# Patient Record
Sex: MALE | Race: WHITE | Employment: OTHER | URBAN - METROPOLITAN AREA
[De-identification: names, ages, dates, MRNs, and addresses within clinical notes are randomized per-mention and may not be internally consistent; named-entity substitution may affect disease eponyms.]

---

## 2018-01-01 ENCOUNTER — APPOINTMENT (OUTPATIENT)
Dept: ULTRASOUND IMAGING | Age: 83
DRG: 245 | End: 2018-01-01
Attending: INTERNAL MEDICINE
Payer: MEDICARE

## 2018-01-01 ENCOUNTER — ANESTHESIA (OUTPATIENT)
Dept: SURGERY | Age: 83
DRG: 245 | End: 2018-01-01
Payer: MEDICARE

## 2018-01-01 ENCOUNTER — ANESTHESIA EVENT (OUTPATIENT)
Dept: SURGERY | Age: 83
DRG: 245 | End: 2018-01-01
Payer: MEDICARE

## 2018-01-01 ENCOUNTER — APPOINTMENT (OUTPATIENT)
Dept: GENERAL RADIOLOGY | Age: 83
DRG: 245 | End: 2018-01-01
Attending: INTERNAL MEDICINE
Payer: MEDICARE

## 2018-01-01 ENCOUNTER — HOSPITAL ENCOUNTER (INPATIENT)
Age: 83
LOS: 2 days | DRG: 682 | End: 2018-05-05
Attending: EMERGENCY MEDICINE | Admitting: INTERNAL MEDICINE
Payer: MEDICARE

## 2018-01-01 ENCOUNTER — APPOINTMENT (OUTPATIENT)
Dept: GENERAL RADIOLOGY | Age: 83
DRG: 638 | End: 2018-01-01
Attending: INTERNAL MEDICINE
Payer: MEDICARE

## 2018-01-01 ENCOUNTER — APPOINTMENT (OUTPATIENT)
Dept: CT IMAGING | Age: 83
DRG: 682 | End: 2018-01-01
Attending: EMERGENCY MEDICINE
Payer: MEDICARE

## 2018-01-01 ENCOUNTER — HOSPITAL ENCOUNTER (INPATIENT)
Age: 83
LOS: 5 days | Discharge: HOME | DRG: 638 | End: 2018-05-01
Attending: INTERNAL MEDICINE | Admitting: HOSPITALIST
Payer: MEDICARE

## 2018-01-01 ENCOUNTER — APPOINTMENT (OUTPATIENT)
Dept: NUCLEAR MEDICINE | Age: 83
DRG: 638 | End: 2018-01-01
Attending: NURSE PRACTITIONER
Payer: MEDICARE

## 2018-01-01 ENCOUNTER — APPOINTMENT (OUTPATIENT)
Dept: GENERAL RADIOLOGY | Age: 83
DRG: 682 | End: 2018-01-01
Attending: FAMILY MEDICINE
Payer: MEDICARE

## 2018-01-01 ENCOUNTER — PATIENT OUTREACH (OUTPATIENT)
Dept: CASE MANAGEMENT | Age: 83
End: 2018-01-01

## 2018-01-01 ENCOUNTER — APPOINTMENT (OUTPATIENT)
Dept: MRI IMAGING | Age: 83
DRG: 638 | End: 2018-01-01
Attending: NURSE PRACTITIONER
Payer: MEDICARE

## 2018-01-01 ENCOUNTER — APPOINTMENT (OUTPATIENT)
Dept: CARDIAC CATH/INVASIVE PROCEDURES | Age: 83
DRG: 245 | End: 2018-01-01
Payer: MEDICARE

## 2018-01-01 ENCOUNTER — APPOINTMENT (OUTPATIENT)
Dept: GENERAL RADIOLOGY | Age: 83
DRG: 682 | End: 2018-01-01
Attending: INTERNAL MEDICINE
Payer: MEDICARE

## 2018-01-01 ENCOUNTER — APPOINTMENT (OUTPATIENT)
Dept: ULTRASOUND IMAGING | Age: 83
DRG: 638 | End: 2018-01-01
Attending: HOSPITALIST
Payer: MEDICARE

## 2018-01-01 ENCOUNTER — HOSPITAL ENCOUNTER (INPATIENT)
Age: 83
LOS: 5 days | Discharge: HOME | DRG: 245 | End: 2018-01-30
Attending: INTERNAL MEDICINE | Admitting: INTERNAL MEDICINE
Payer: MEDICARE

## 2018-01-01 VITALS
BODY MASS INDEX: 25.12 KG/M2 | SYSTOLIC BLOOD PRESSURE: 98 MMHG | HEART RATE: 94 BPM | DIASTOLIC BLOOD PRESSURE: 66 MMHG | WEIGHT: 165.2 LBS | TEMPERATURE: 97.7 F | RESPIRATION RATE: 18 BRPM | OXYGEN SATURATION: 90 %

## 2018-01-01 VITALS
RESPIRATION RATE: 18 BRPM | HEART RATE: 80 BPM | BODY MASS INDEX: 29.22 KG/M2 | WEIGHT: 192.8 LBS | OXYGEN SATURATION: 97 % | SYSTOLIC BLOOD PRESSURE: 101 MMHG | DIASTOLIC BLOOD PRESSURE: 58 MMHG | TEMPERATURE: 97 F | HEIGHT: 68 IN

## 2018-01-01 VITALS
TEMPERATURE: 97.8 F | BODY MASS INDEX: 27.5 KG/M2 | DIASTOLIC BLOOD PRESSURE: 60 MMHG | WEIGHT: 181.44 LBS | HEIGHT: 68 IN | SYSTOLIC BLOOD PRESSURE: 151 MMHG

## 2018-01-01 DIAGNOSIS — N17.9 AKI (ACUTE KIDNEY INJURY) (HCC): ICD-10-CM

## 2018-01-01 DIAGNOSIS — I50.22 CHRONIC SYSTOLIC HEART FAILURE (HCC): ICD-10-CM

## 2018-01-01 DIAGNOSIS — E86.0 DEHYDRATION: ICD-10-CM

## 2018-01-01 DIAGNOSIS — I95.9 HYPOTENSION, UNSPECIFIED HYPOTENSION TYPE: Primary | ICD-10-CM

## 2018-01-01 DIAGNOSIS — I46.9 CARDIAC ARREST (HCC): ICD-10-CM

## 2018-01-01 DIAGNOSIS — I49.01 VENTRICULAR FIBRILLATION (HCC): Primary | ICD-10-CM

## 2018-01-01 LAB
ALBUMIN SERPL-MCNC: 2.4 G/DL (ref 3.2–4.6)
ALBUMIN SERPL-MCNC: 2.7 G/DL (ref 3.2–4.6)
ALBUMIN/GLOB SERPL: 0.5 {RATIO} (ref 1.2–3.5)
ALBUMIN/GLOB SERPL: 0.6 {RATIO} (ref 1.2–3.5)
ALP SERPL-CCNC: 172 U/L (ref 50–136)
ALP SERPL-CCNC: 86 U/L (ref 50–136)
ALT SERPL-CCNC: 43 U/L (ref 12–65)
ALT SERPL-CCNC: 64 U/L (ref 12–65)
ANION GAP SERPL CALC-SCNC: 10 MMOL/L (ref 7–16)
ANION GAP SERPL CALC-SCNC: 10 MMOL/L (ref 7–16)
ANION GAP SERPL CALC-SCNC: 11 MMOL/L (ref 7–16)
ANION GAP SERPL CALC-SCNC: 12 MMOL/L (ref 7–16)
ANION GAP SERPL CALC-SCNC: 13 MMOL/L (ref 7–16)
ANION GAP SERPL CALC-SCNC: 14 MMOL/L (ref 7–16)
ANION GAP SERPL CALC-SCNC: 15 MMOL/L (ref 7–16)
ANION GAP SERPL CALC-SCNC: 16 MMOL/L (ref 7–16)
ANION GAP SERPL CALC-SCNC: 7 MMOL/L (ref 7–16)
ANION GAP SERPL CALC-SCNC: 9 MMOL/L (ref 7–16)
APPEARANCE UR: ABNORMAL
ARTERIAL PATENCY WRIST A: ABNORMAL
ARTERIAL PATENCY WRIST A: POSITIVE
AST SERPL-CCNC: 48 U/L (ref 15–37)
AST SERPL-CCNC: 90 U/L (ref 15–37)
ATRIAL RATE: 66 BPM
ATRIAL RATE: 80 BPM
ATRIAL RATE: 80 BPM
BACTERIA SPEC CULT: NORMAL
BACTERIA SPEC CULT: NORMAL
BACTERIA URNS QL MICRO: NORMAL /HPF
BASE DEFICIT BLDA-SCNC: 12.4 MMOL/L (ref 0–2)
BASE EXCESS BLD CALC-SCNC: 1 MMOL/L
BASE EXCESS BLDA CALC-SCNC: 0 MMOL/L (ref 0–3)
BASOPHILS # BLD: 0 K/UL (ref 0–0.2)
BASOPHILS # BLD: 0.1 K/UL (ref 0–0.2)
BASOPHILS NFR BLD: 0 % (ref 0–2)
BASOPHILS NFR BLD: 1 % (ref 0–2)
BDY SITE: ABNORMAL
BDY SITE: ABNORMAL
BILIRUB SERPL-MCNC: 1.4 MG/DL (ref 0.2–1.1)
BILIRUB SERPL-MCNC: 1.5 MG/DL (ref 0.2–1.1)
BILIRUB UR QL: ABNORMAL
BUN SERPL-MCNC: 33 MG/DL (ref 8–23)
BUN SERPL-MCNC: 36 MG/DL (ref 8–23)
BUN SERPL-MCNC: 39 MG/DL (ref 8–23)
BUN SERPL-MCNC: 40 MG/DL (ref 8–23)
BUN SERPL-MCNC: 41 MG/DL (ref 8–23)
BUN SERPL-MCNC: 41 MG/DL (ref 8–23)
BUN SERPL-MCNC: 42 MG/DL (ref 8–23)
BUN SERPL-MCNC: 47 MG/DL (ref 8–23)
BUN SERPL-MCNC: 51 MG/DL (ref 8–23)
BUN SERPL-MCNC: 52 MG/DL (ref 8–23)
BUN SERPL-MCNC: 53 MG/DL (ref 8–23)
BUN SERPL-MCNC: 57 MG/DL (ref 8–23)
BUN SERPL-MCNC: 59 MG/DL (ref 8–23)
BUN SERPL-MCNC: 69 MG/DL (ref 8–23)
CA-I BLD-MCNC: 1.27 MMOL/L (ref 1.12–1.32)
CALCIUM SERPL-MCNC: 8 MG/DL (ref 8.3–10.4)
CALCIUM SERPL-MCNC: 8.1 MG/DL (ref 8.3–10.4)
CALCIUM SERPL-MCNC: 8.2 MG/DL (ref 8.3–10.4)
CALCIUM SERPL-MCNC: 8.2 MG/DL (ref 8.3–10.4)
CALCIUM SERPL-MCNC: 8.3 MG/DL (ref 8.3–10.4)
CALCIUM SERPL-MCNC: 8.3 MG/DL (ref 8.3–10.4)
CALCIUM SERPL-MCNC: 8.4 MG/DL (ref 8.3–10.4)
CALCIUM SERPL-MCNC: 8.5 MG/DL (ref 8.3–10.4)
CALCIUM SERPL-MCNC: 8.7 MG/DL (ref 8.3–10.4)
CALCIUM SERPL-MCNC: 8.7 MG/DL (ref 8.3–10.4)
CALCIUM SERPL-MCNC: 8.8 MG/DL (ref 8.3–10.4)
CALCIUM SERPL-MCNC: 8.9 MG/DL (ref 8.3–10.4)
CALCIUM SERPL-MCNC: 9 MG/DL (ref 8.3–10.4)
CALCULATED P AXIS, ECG09: -18 DEGREES
CALCULATED R AXIS, ECG10: -162 DEGREES
CALCULATED R AXIS, ECG10: -163 DEGREES
CALCULATED R AXIS, ECG10: -84 DEGREES
CALCULATED T AXIS, ECG11: 120 DEGREES
CALCULATED T AXIS, ECG11: 122 DEGREES
CALCULATED T AXIS, ECG11: 129 DEGREES
CASTS URNS QL MICRO: 0 /LPF
CHLORIDE SERPL-SCNC: 102 MMOL/L (ref 98–107)
CHLORIDE SERPL-SCNC: 104 MMOL/L (ref 98–107)
CHLORIDE SERPL-SCNC: 105 MMOL/L (ref 98–107)
CHLORIDE SERPL-SCNC: 106 MMOL/L (ref 98–107)
CHLORIDE SERPL-SCNC: 106 MMOL/L (ref 98–107)
CHLORIDE SERPL-SCNC: 107 MMOL/L (ref 98–107)
CHLORIDE SERPL-SCNC: 107 MMOL/L (ref 98–107)
CHLORIDE SERPL-SCNC: 109 MMOL/L (ref 98–107)
CHLORIDE SERPL-SCNC: 109 MMOL/L (ref 98–107)
CO2 SERPL-SCNC: 16 MMOL/L (ref 21–32)
CO2 SERPL-SCNC: 17 MMOL/L (ref 21–32)
CO2 SERPL-SCNC: 19 MMOL/L (ref 21–32)
CO2 SERPL-SCNC: 21 MMOL/L (ref 21–32)
CO2 SERPL-SCNC: 21 MMOL/L (ref 21–32)
CO2 SERPL-SCNC: 22 MMOL/L (ref 21–32)
CO2 SERPL-SCNC: 23 MMOL/L (ref 21–32)
CO2 SERPL-SCNC: 25 MMOL/L (ref 21–32)
CO2 SERPL-SCNC: 25 MMOL/L (ref 21–32)
CO2 SERPL-SCNC: 26 MMOL/L (ref 21–32)
CO2 SERPL-SCNC: 26 MMOL/L (ref 21–32)
CO2 SERPL-SCNC: 27 MMOL/L (ref 21–32)
COHGB MFR BLD: 0 % (ref 0.5–1.5)
COHGB MFR BLD: 0.5 % (ref 0.5–1.5)
COLOR UR: ABNORMAL
CREAT SERPL-MCNC: 1.92 MG/DL (ref 0.8–1.5)
CREAT SERPL-MCNC: 1.95 MG/DL (ref 0.8–1.5)
CREAT SERPL-MCNC: 2.46 MG/DL (ref 0.8–1.5)
CREAT SERPL-MCNC: 2.56 MG/DL (ref 0.8–1.5)
CREAT SERPL-MCNC: 2.63 MG/DL (ref 0.8–1.5)
CREAT SERPL-MCNC: 2.65 MG/DL (ref 0.8–1.5)
CREAT SERPL-MCNC: 2.67 MG/DL (ref 0.8–1.5)
CREAT SERPL-MCNC: 2.71 MG/DL (ref 0.8–1.5)
CREAT SERPL-MCNC: 2.72 MG/DL (ref 0.8–1.5)
CREAT SERPL-MCNC: 2.78 MG/DL (ref 0.8–1.5)
CREAT SERPL-MCNC: 2.89 MG/DL (ref 0.8–1.5)
CREAT SERPL-MCNC: 2.9 MG/DL (ref 0.8–1.5)
CREAT SERPL-MCNC: 3.03 MG/DL (ref 0.8–1.5)
CREAT SERPL-MCNC: 3.48 MG/DL (ref 0.8–1.5)
CREAT SERPL-MCNC: 3.56 MG/DL (ref 0.8–1.5)
CREAT SERPL-MCNC: 3.87 MG/DL (ref 0.8–1.5)
CREAT UR-MCNC: 212 MG/DL
CRP SERPL-MCNC: 4.4 MG/DL (ref 0–0.9)
CRYSTALS URNS QL MICRO: 0 /LPF
DIAGNOSIS, 93000: NORMAL
DIFFERENTIAL METHOD BLD: ABNORMAL
DO-HGB BLD-MCNC: 1 % (ref 0–5)
DO-HGB BLD-MCNC: 7 % (ref 0–5)
EOSINOPHIL # BLD: 0 K/UL (ref 0–0.8)
EOSINOPHIL # BLD: 0.1 K/UL (ref 0–0.8)
EOSINOPHIL # BLD: 0.2 K/UL (ref 0–0.8)
EOSINOPHIL NFR BLD: 0 % (ref 0.5–7.8)
EOSINOPHIL NFR BLD: 1 % (ref 0.5–7.8)
EOSINOPHIL NFR BLD: 3 % (ref 0.5–7.8)
EOSINOPHIL NFR BLD: 3 % (ref 0.5–7.8)
EOSINOPHIL NFR BLD: 4 % (ref 0.5–7.8)
EPI CELLS #/AREA URNS HPF: NORMAL /HPF
ERYTHROCYTE [DISTWIDTH] IN BLOOD BY AUTOMATED COUNT: 15.1 % (ref 11.9–14.6)
ERYTHROCYTE [DISTWIDTH] IN BLOOD BY AUTOMATED COUNT: 15.5 % (ref 11.9–14.6)
ERYTHROCYTE [DISTWIDTH] IN BLOOD BY AUTOMATED COUNT: 15.6 % (ref 11.9–14.6)
ERYTHROCYTE [DISTWIDTH] IN BLOOD BY AUTOMATED COUNT: 15.6 % (ref 11.9–14.6)
ERYTHROCYTE [DISTWIDTH] IN BLOOD BY AUTOMATED COUNT: 15.8 % (ref 11.9–14.6)
ERYTHROCYTE [DISTWIDTH] IN BLOOD BY AUTOMATED COUNT: 16 % (ref 11.9–14.6)
ERYTHROCYTE [DISTWIDTH] IN BLOOD BY AUTOMATED COUNT: 16.1 % (ref 11.9–14.6)
ERYTHROCYTE [DISTWIDTH] IN BLOOD BY AUTOMATED COUNT: 16.4 % (ref 11.9–14.6)
ERYTHROCYTE [DISTWIDTH] IN BLOOD BY AUTOMATED COUNT: 17.5 % (ref 11.9–14.6)
ERYTHROCYTE [DISTWIDTH] IN BLOOD BY AUTOMATED COUNT: 17.7 % (ref 11.9–14.6)
ERYTHROCYTE [SEDIMENTATION RATE] IN BLOOD: 54 MM/HR (ref 0–20)
FIO2 ON VENT: 100 %
FIO2 ON VENT: 40 %
GAS FLOW.O2 O2 DELIVERY SYS: 5 L/MIN
GLOBULIN SER CALC-MCNC: 3.7 G/DL (ref 2.3–3.5)
GLOBULIN SER CALC-MCNC: 5.4 G/DL (ref 2.3–3.5)
GLUCOSE BLD STRIP.AUTO-MCNC: 104 MG/DL (ref 65–100)
GLUCOSE BLD STRIP.AUTO-MCNC: 117 MG/DL (ref 65–100)
GLUCOSE BLD STRIP.AUTO-MCNC: 119 MG/DL (ref 65–100)
GLUCOSE BLD STRIP.AUTO-MCNC: 119 MG/DL (ref 65–100)
GLUCOSE BLD STRIP.AUTO-MCNC: 122 MG/DL (ref 65–100)
GLUCOSE BLD STRIP.AUTO-MCNC: 123 MG/DL (ref 65–100)
GLUCOSE BLD STRIP.AUTO-MCNC: 124 MG/DL (ref 65–100)
GLUCOSE BLD STRIP.AUTO-MCNC: 125 MG/DL (ref 65–100)
GLUCOSE BLD STRIP.AUTO-MCNC: 127 MG/DL (ref 65–100)
GLUCOSE BLD STRIP.AUTO-MCNC: 141 MG/DL (ref 65–100)
GLUCOSE BLD STRIP.AUTO-MCNC: 142 MG/DL (ref 65–100)
GLUCOSE BLD STRIP.AUTO-MCNC: 146 MG/DL (ref 65–100)
GLUCOSE BLD STRIP.AUTO-MCNC: 146 MG/DL (ref 65–100)
GLUCOSE BLD STRIP.AUTO-MCNC: 147 MG/DL (ref 65–100)
GLUCOSE BLD STRIP.AUTO-MCNC: 148 MG/DL (ref 65–100)
GLUCOSE BLD STRIP.AUTO-MCNC: 148 MG/DL (ref 65–100)
GLUCOSE BLD STRIP.AUTO-MCNC: 155 MG/DL (ref 65–100)
GLUCOSE BLD STRIP.AUTO-MCNC: 157 MG/DL (ref 65–100)
GLUCOSE BLD STRIP.AUTO-MCNC: 161 MG/DL (ref 65–100)
GLUCOSE BLD STRIP.AUTO-MCNC: 168 MG/DL (ref 65–100)
GLUCOSE BLD STRIP.AUTO-MCNC: 175 MG/DL (ref 65–100)
GLUCOSE BLD STRIP.AUTO-MCNC: 180 MG/DL (ref 65–100)
GLUCOSE BLD STRIP.AUTO-MCNC: 201 MG/DL (ref 65–100)
GLUCOSE BLD STRIP.AUTO-MCNC: 220 MG/DL (ref 65–100)
GLUCOSE BLD STRIP.AUTO-MCNC: 85 MG/DL (ref 65–100)
GLUCOSE BLD STRIP.AUTO-MCNC: 90 MG/DL (ref 65–100)
GLUCOSE BLD STRIP.AUTO-MCNC: 98 MG/DL (ref 65–100)
GLUCOSE BLD STRIP.AUTO-MCNC: 99 MG/DL (ref 65–100)
GLUCOSE SERPL-MCNC: 101 MG/DL (ref 65–100)
GLUCOSE SERPL-MCNC: 104 MG/DL (ref 65–100)
GLUCOSE SERPL-MCNC: 110 MG/DL (ref 65–100)
GLUCOSE SERPL-MCNC: 125 MG/DL (ref 65–100)
GLUCOSE SERPL-MCNC: 127 MG/DL (ref 65–100)
GLUCOSE SERPL-MCNC: 133 MG/DL (ref 65–100)
GLUCOSE SERPL-MCNC: 139 MG/DL (ref 65–100)
GLUCOSE SERPL-MCNC: 140 MG/DL (ref 65–100)
GLUCOSE SERPL-MCNC: 145 MG/DL (ref 65–100)
GLUCOSE SERPL-MCNC: 147 MG/DL (ref 65–100)
GLUCOSE SERPL-MCNC: 151 MG/DL (ref 65–100)
GLUCOSE SERPL-MCNC: 157 MG/DL (ref 65–100)
GLUCOSE SERPL-MCNC: 162 MG/DL (ref 65–100)
GLUCOSE SERPL-MCNC: 173 MG/DL (ref 65–100)
GLUCOSE SERPL-MCNC: 92 MG/DL (ref 65–100)
GLUCOSE SERPL-MCNC: 96 MG/DL (ref 65–100)
GLUCOSE UR STRIP.AUTO-MCNC: NEGATIVE MG/DL
HCO3 BLD-SCNC: 28.2 MMOL/L (ref 22–26)
HCO3 BLDA-SCNC: 13 MMOL/L (ref 22–26)
HCO3 BLDA-SCNC: 25 MMOL/L (ref 22–26)
HCT VFR BLD AUTO: 29.8 % (ref 41.1–50.3)
HCT VFR BLD AUTO: 34.3 % (ref 41.1–50.3)
HCT VFR BLD AUTO: 34.4 % (ref 41.1–50.3)
HCT VFR BLD AUTO: 34.6 % (ref 41.1–50.3)
HCT VFR BLD AUTO: 35 % (ref 41.1–50.3)
HCT VFR BLD AUTO: 35.3 % (ref 41.1–50.3)
HCT VFR BLD AUTO: 35.4 % (ref 41.1–50.3)
HCT VFR BLD AUTO: 36.4 % (ref 41.1–50.3)
HCT VFR BLD AUTO: 37.3 % (ref 41.1–50.3)
HCT VFR BLD AUTO: 37.7 % (ref 41.1–50.3)
HGB BLD-MCNC: 10.8 G/DL (ref 13.6–17.2)
HGB BLD-MCNC: 11 G/DL (ref 13.6–17.2)
HGB BLD-MCNC: 11.1 G/DL (ref 13.6–17.2)
HGB BLD-MCNC: 11.4 G/DL (ref 13.6–17.2)
HGB BLD-MCNC: 11.4 G/DL (ref 13.6–17.2)
HGB BLD-MCNC: 11.5 G/DL (ref 13.6–17.2)
HGB BLD-MCNC: 11.9 G/DL (ref 13.6–17.2)
HGB BLD-MCNC: 12.2 G/DL (ref 13.6–17.2)
HGB BLD-MCNC: 12.3 G/DL (ref 13.6–17.2)
HGB BLD-MCNC: 9.7 G/DL (ref 13.6–17.2)
HGB BLDMV-MCNC: 11.3 GM/DL (ref 11.7–15)
HGB BLDMV-MCNC: 13.2 GM/DL (ref 11.7–15)
HGB UR QL STRIP: ABNORMAL
IMM GRANULOCYTES # BLD: 0 K/UL (ref 0–0.5)
IMM GRANULOCYTES NFR BLD AUTO: 0 % (ref 0–5)
INR PPP: 1.2
KETONES UR QL STRIP.AUTO: NEGATIVE MG/DL
LACTATE BLD-SCNC: 1.4 MMOL/L (ref 0.5–1.9)
LACTATE SERPL-SCNC: 1.8 MMOL/L (ref 0.4–2)
LACTATE SERPL-SCNC: 2.8 MMOL/L (ref 0.4–2)
LEUKOCYTE ESTERASE UR QL STRIP.AUTO: ABNORMAL
LYMPHOCYTES # BLD: 0.7 K/UL (ref 0.5–4.6)
LYMPHOCYTES # BLD: 0.9 K/UL (ref 0.5–4.6)
LYMPHOCYTES # BLD: 1.3 K/UL (ref 0.5–4.6)
LYMPHOCYTES # BLD: 1.3 K/UL (ref 0.5–4.6)
LYMPHOCYTES # BLD: 1.5 K/UL (ref 0.5–4.6)
LYMPHOCYTES # BLD: 1.7 K/UL (ref 0.5–4.6)
LYMPHOCYTES # BLD: 1.8 K/UL (ref 0.5–4.6)
LYMPHOCYTES NFR BLD: 13 % (ref 13–44)
LYMPHOCYTES NFR BLD: 18 % (ref 13–44)
LYMPHOCYTES NFR BLD: 21 % (ref 13–44)
LYMPHOCYTES NFR BLD: 24 % (ref 13–44)
LYMPHOCYTES NFR BLD: 26 % (ref 13–44)
LYMPHOCYTES NFR BLD: 5 % (ref 13–44)
LYMPHOCYTES NFR BLD: 9 % (ref 13–44)
MAGNESIUM SERPL-MCNC: 2 MG/DL (ref 1.8–2.4)
MAGNESIUM SERPL-MCNC: 2.3 MG/DL (ref 1.8–2.4)
MAGNESIUM SERPL-MCNC: 2.6 MG/DL (ref 1.8–2.4)
MAGNESIUM SERPL-MCNC: 2.9 MG/DL (ref 1.8–2.4)
MCH RBC QN AUTO: 29.9 PG (ref 26.1–32.9)
MCH RBC QN AUTO: 30.3 PG (ref 26.1–32.9)
MCH RBC QN AUTO: 30.4 PG (ref 26.1–32.9)
MCH RBC QN AUTO: 30.4 PG (ref 26.1–32.9)
MCH RBC QN AUTO: 30.5 PG (ref 26.1–32.9)
MCH RBC QN AUTO: 30.5 PG (ref 26.1–32.9)
MCH RBC QN AUTO: 30.7 PG (ref 26.1–32.9)
MCH RBC QN AUTO: 30.7 PG (ref 26.1–32.9)
MCH RBC QN AUTO: 30.8 PG (ref 26.1–32.9)
MCH RBC QN AUTO: 31.5 PG (ref 26.1–32.9)
MCHC RBC AUTO-ENTMCNC: 31.5 G/DL (ref 31.4–35)
MCHC RBC AUTO-ENTMCNC: 31.8 G/DL (ref 31.4–35)
MCHC RBC AUTO-ENTMCNC: 32.2 G/DL (ref 31.4–35)
MCHC RBC AUTO-ENTMCNC: 32.3 G/DL (ref 31.4–35)
MCHC RBC AUTO-ENTMCNC: 32.6 G/DL (ref 31.4–35)
MCHC RBC AUTO-ENTMCNC: 32.7 G/DL (ref 31.4–35)
MCHC RBC AUTO-ENTMCNC: 32.7 G/DL (ref 31.4–35)
MCV RBC AUTO: 93.3 FL (ref 79.6–97.8)
MCV RBC AUTO: 93.6 FL (ref 79.6–97.8)
MCV RBC AUTO: 93.7 FL (ref 79.6–97.8)
MCV RBC AUTO: 94.2 FL (ref 79.6–97.8)
MCV RBC AUTO: 94.3 FL (ref 79.6–97.8)
MCV RBC AUTO: 94.5 FL (ref 79.6–97.8)
MCV RBC AUTO: 94.7 FL (ref 79.6–97.8)
MCV RBC AUTO: 95 FL (ref 79.6–97.8)
MCV RBC AUTO: 95.3 FL (ref 79.6–97.8)
MCV RBC AUTO: 96.3 FL (ref 79.6–97.8)
METHGB MFR BLD: 0.2 % (ref 0–1.5)
METHGB MFR BLD: 0.6 % (ref 0–1.5)
MM INDURATION POC: 0 MM (ref 0–5)
MM INDURATION POC: 0 MM (ref 0–5)
MONOCYTES # BLD: 0.3 K/UL (ref 0.1–1.3)
MONOCYTES # BLD: 0.4 K/UL (ref 0.1–1.3)
MONOCYTES # BLD: 0.4 K/UL (ref 0.1–1.3)
MONOCYTES # BLD: 0.5 K/UL (ref 0.1–1.3)
MONOCYTES # BLD: 0.5 K/UL (ref 0.1–1.3)
MONOCYTES # BLD: 0.6 K/UL (ref 0.1–1.3)
MONOCYTES # BLD: 0.6 K/UL (ref 0.1–1.3)
MONOCYTES NFR BLD: 4 % (ref 4–12)
MONOCYTES NFR BLD: 4 % (ref 4–12)
MONOCYTES NFR BLD: 5 % (ref 4–12)
MONOCYTES NFR BLD: 5 % (ref 4–12)
MONOCYTES NFR BLD: 6 % (ref 4–12)
MONOCYTES NFR BLD: 8 % (ref 4–12)
MONOCYTES NFR BLD: 8 % (ref 4–12)
MUCOUS THREADS URNS QL MICRO: 0 /LPF
NEUTS SEG # BLD: 11.3 K/UL (ref 1.7–8.2)
NEUTS SEG # BLD: 4.1 K/UL (ref 1.7–8.2)
NEUTS SEG # BLD: 4.1 K/UL (ref 1.7–8.2)
NEUTS SEG # BLD: 5.4 K/UL (ref 1.7–8.2)
NEUTS SEG # BLD: 6.1 K/UL (ref 1.7–8.2)
NEUTS SEG # BLD: 8.3 K/UL (ref 1.7–8.2)
NEUTS SEG # BLD: 8.7 K/UL (ref 1.7–8.2)
NEUTS SEG NFR BLD: 61 % (ref 43–78)
NEUTS SEG NFR BLD: 66 % (ref 43–78)
NEUTS SEG NFR BLD: 72 % (ref 43–78)
NEUTS SEG NFR BLD: 72 % (ref 43–78)
NEUTS SEG NFR BLD: 82 % (ref 43–78)
NEUTS SEG NFR BLD: 87 % (ref 43–78)
NEUTS SEG NFR BLD: 90 % (ref 43–78)
NITRITE UR QL STRIP.AUTO: NEGATIVE
OXYHGB MFR BLDA: 91.9 % (ref 94–97)
OXYHGB MFR BLDA: 98.9 % (ref 94–97)
P-R INTERVAL, ECG05: 152 MS
PCO2 BLD: 63 MMHG (ref 35–45)
PCO2 BLDA: 27 MMHG (ref 35–45)
PCO2 BLDA: 40 MMHG (ref 35–45)
PEEP RESPIRATORY: 8 CM[H2O]
PH BLD: 7.26 [PH] (ref 7.35–7.45)
PH BLDA: 7.29 [PH] (ref 7.35–7.45)
PH BLDA: 7.41 [PH] (ref 7.35–7.45)
PH UR STRIP: 5 [PH] (ref 5–9)
PHOSPHATE SERPL-MCNC: 2.5 MG/DL (ref 2.3–3.7)
PLATELET # BLD AUTO: 114 K/UL (ref 150–450)
PLATELET # BLD AUTO: 121 K/UL (ref 150–450)
PLATELET # BLD AUTO: 121 K/UL (ref 150–450)
PLATELET # BLD AUTO: 124 K/UL (ref 150–450)
PLATELET # BLD AUTO: 131 K/UL (ref 150–450)
PLATELET # BLD AUTO: 134 K/UL (ref 150–450)
PLATELET # BLD AUTO: 150 K/UL (ref 150–450)
PLATELET # BLD AUTO: 156 K/UL (ref 150–450)
PLATELET # BLD AUTO: 157 K/UL (ref 150–450)
PLATELET # BLD AUTO: 177 K/UL (ref 150–450)
PMV BLD AUTO: 10.9 FL (ref 10.8–14.1)
PMV BLD AUTO: 11 FL (ref 10.8–14.1)
PMV BLD AUTO: 11.2 FL (ref 10.8–14.1)
PMV BLD AUTO: 11.4 FL (ref 10.8–14.1)
PMV BLD AUTO: 11.4 FL (ref 10.8–14.1)
PMV BLD AUTO: 11.5 FL (ref 10.8–14.1)
PMV BLD AUTO: 11.5 FL (ref 10.8–14.1)
PMV BLD AUTO: 11.6 FL (ref 10.8–14.1)
PMV BLD AUTO: 11.9 FL (ref 10.8–14.1)
PMV BLD AUTO: 11.9 FL (ref 10.8–14.1)
PO2 BLD: 19 MMHG (ref 80–105)
PO2 BLDA: 316 MMHG (ref 80–105)
PO2 BLDA: 75 MMHG (ref 80–105)
POTASSIUM BLD-SCNC: 4.2 MMOL/L (ref 3.5–5.1)
POTASSIUM SERPL-SCNC: 3.7 MMOL/L (ref 3.5–5.1)
POTASSIUM SERPL-SCNC: 3.8 MMOL/L (ref 3.5–5.1)
POTASSIUM SERPL-SCNC: 3.9 MMOL/L (ref 3.5–5.1)
POTASSIUM SERPL-SCNC: 4 MMOL/L (ref 3.5–5.1)
POTASSIUM SERPL-SCNC: 4 MMOL/L (ref 3.5–5.1)
POTASSIUM SERPL-SCNC: 4.2 MMOL/L (ref 3.5–5.1)
POTASSIUM SERPL-SCNC: 4.2 MMOL/L (ref 3.5–5.1)
POTASSIUM SERPL-SCNC: 4.3 MMOL/L (ref 3.5–5.1)
POTASSIUM SERPL-SCNC: 4.4 MMOL/L (ref 3.5–5.1)
POTASSIUM SERPL-SCNC: 4.4 MMOL/L (ref 3.5–5.1)
PPD POC: NORMAL NEGATIVE
PPD POC: NORMAL NEGATIVE
PROT SERPL-MCNC: 6.1 G/DL (ref 6.3–8.2)
PROT SERPL-MCNC: 8.1 G/DL (ref 6.3–8.2)
PROT UR STRIP-MCNC: 30 MG/DL
PROTHROMBIN TIME: 14.6 SEC (ref 11.5–14.5)
Q-T INTERVAL, ECG07: 516 MS
Q-T INTERVAL, ECG07: 594 MS
Q-T INTERVAL, ECG07: 610 MS
QRS DURATION, ECG06: 188 MS
QRS DURATION, ECG06: 202 MS
QRS DURATION, ECG06: 214 MS
QTC CALCULATION (BEZET), ECG08: 595 MS
QTC CALCULATION (BEZET), ECG08: 644 MS
QTC CALCULATION (BEZET), ECG08: 685 MS
RBC # BLD AUTO: 3.16 M/UL (ref 4.23–5.67)
RBC # BLD AUTO: 3.61 M/UL (ref 4.23–5.67)
RBC # BLD AUTO: 3.63 M/UL (ref 4.23–5.67)
RBC # BLD AUTO: 3.65 M/UL (ref 4.23–5.67)
RBC # BLD AUTO: 3.74 M/UL (ref 4.23–5.67)
RBC # BLD AUTO: 3.75 M/UL (ref 4.23–5.67)
RBC # BLD AUTO: 3.77 M/UL (ref 4.23–5.67)
RBC # BLD AUTO: 3.78 M/UL (ref 4.23–5.67)
RBC # BLD AUTO: 3.98 M/UL (ref 4.23–5.67)
RBC # BLD AUTO: 3.99 M/UL (ref 4.23–5.67)
RBC #/AREA URNS HPF: >100 /HPF
RESP RATE: 8
SAO2 % BLD: 23 % (ref 95–98)
SAO2 % BLD: 93 % (ref 92–98.5)
SAO2 % BLD: 99 % (ref 92–98.5)
SERVICE CMNT-IMP: NORMAL
SERVICE CMNT-IMP: NORMAL
SODIUM BLD-SCNC: 143 MMOL/L (ref 136–145)
SODIUM SERPL-SCNC: 137 MMOL/L (ref 136–145)
SODIUM SERPL-SCNC: 137 MMOL/L (ref 136–145)
SODIUM SERPL-SCNC: 138 MMOL/L (ref 136–145)
SODIUM SERPL-SCNC: 138 MMOL/L (ref 136–145)
SODIUM SERPL-SCNC: 139 MMOL/L (ref 136–145)
SODIUM SERPL-SCNC: 139 MMOL/L (ref 136–145)
SODIUM SERPL-SCNC: 140 MMOL/L (ref 136–145)
SODIUM SERPL-SCNC: 140 MMOL/L (ref 136–145)
SODIUM SERPL-SCNC: 141 MMOL/L (ref 136–145)
SODIUM SERPL-SCNC: 142 MMOL/L (ref 136–145)
SODIUM SERPL-SCNC: 142 MMOL/L (ref 136–145)
SODIUM SERPL-SCNC: 143 MMOL/L (ref 136–145)
SODIUM UR-SCNC: 63 MMOL/L
SP GR UR REFRACTOMETRY: 1.02 (ref 1–1.02)
UROBILINOGEN UR QL STRIP.AUTO: 1 EU/DL (ref 0.2–1)
VANCOMYCIN SERPL-MCNC: 23.5 UG/ML
VANCOMYCIN SERPL-MCNC: 29 UG/ML
VENTILATION MODE VENT: ABNORMAL
VENTILATION MODE VENT: ABNORMAL
VENTRICULAR RATE, ECG03: 67 BPM
VENTRICULAR RATE, ECG03: 80 BPM
VENTRICULAR RATE, ECG03: 80 BPM
WBC # BLD AUTO: 10.2 K/UL (ref 4.3–11.1)
WBC # BLD AUTO: 11.6 K/UL (ref 4.3–11.1)
WBC # BLD AUTO: 12.6 K/UL (ref 4.3–11.1)
WBC # BLD AUTO: 6.3 K/UL (ref 4.3–11.1)
WBC # BLD AUTO: 6.7 K/UL (ref 4.3–11.1)
WBC # BLD AUTO: 7.1 K/UL (ref 4.3–11.1)
WBC # BLD AUTO: 7.4 K/UL (ref 4.3–11.1)
WBC # BLD AUTO: 7.6 K/UL (ref 4.3–11.1)
WBC # BLD AUTO: 8.5 K/UL (ref 4.3–11.1)
WBC # BLD AUTO: 9.9 K/UL (ref 4.3–11.1)
WBC URNS QL MICRO: NORMAL /HPF

## 2018-01-01 PROCEDURE — 80048 BASIC METABOLIC PNL TOTAL CA: CPT | Performed by: INTERNAL MEDICINE

## 2018-01-01 PROCEDURE — 77010033678 HC OXYGEN DAILY

## 2018-01-01 PROCEDURE — 71045 X-RAY EXAM CHEST 1 VIEW: CPT

## 2018-01-01 PROCEDURE — 97162 PT EVAL MOD COMPLEX 30 MIN: CPT

## 2018-01-01 PROCEDURE — 92610 EVALUATE SWALLOWING FUNCTION: CPT

## 2018-01-01 PROCEDURE — 82570 ASSAY OF URINE CREATININE: CPT | Performed by: INTERNAL MEDICINE

## 2018-01-01 PROCEDURE — 82962 GLUCOSE BLOOD TEST: CPT

## 2018-01-01 PROCEDURE — 74011250636 HC RX REV CODE- 250/636: Performed by: INTERNAL MEDICINE

## 2018-01-01 PROCEDURE — 74011000250 HC RX REV CODE- 250: Performed by: HOSPITALIST

## 2018-01-01 PROCEDURE — 65270000029 HC RM PRIVATE

## 2018-01-01 PROCEDURE — 80048 BASIC METABOLIC PNL TOTAL CA: CPT | Performed by: EMERGENCY MEDICINE

## 2018-01-01 PROCEDURE — 74011000258 HC RX REV CODE- 258: Performed by: HOSPITALIST

## 2018-01-01 PROCEDURE — 84100 ASSAY OF PHOSPHORUS: CPT | Performed by: HOSPITALIST

## 2018-01-01 PROCEDURE — 51798 US URINE CAPACITY MEASURE: CPT

## 2018-01-01 PROCEDURE — 74011250636 HC RX REV CODE- 250/636: Performed by: NURSE PRACTITIONER

## 2018-01-01 PROCEDURE — 85027 COMPLETE CBC AUTOMATED: CPT | Performed by: INTERNAL MEDICINE

## 2018-01-01 PROCEDURE — 85025 COMPLETE CBC W/AUTO DIFF WBC: CPT | Performed by: NURSE PRACTITIONER

## 2018-01-01 PROCEDURE — 74011250637 HC RX REV CODE- 250/637: Performed by: HOSPITALIST

## 2018-01-01 PROCEDURE — 74011000250 HC RX REV CODE- 250: Performed by: INTERNAL MEDICINE

## 2018-01-01 PROCEDURE — 83605 ASSAY OF LACTIC ACID: CPT | Performed by: INTERNAL MEDICINE

## 2018-01-01 PROCEDURE — 97161 PT EVAL LOW COMPLEX 20 MIN: CPT

## 2018-01-01 PROCEDURE — 77030011256 HC DRSG MEPILEX <16IN NO BORD MOLN -A

## 2018-01-01 PROCEDURE — 74011250637 HC RX REV CODE- 250/637: Performed by: INTERNAL MEDICINE

## 2018-01-01 PROCEDURE — 80048 BASIC METABOLIC PNL TOTAL CA: CPT | Performed by: NURSE PRACTITIONER

## 2018-01-01 PROCEDURE — C1751 CATH, INF, PER/CENT/MIDLINE: HCPCS

## 2018-01-01 PROCEDURE — 77030019605

## 2018-01-01 PROCEDURE — 83605 ASSAY OF LACTIC ACID: CPT | Performed by: HOSPITALIST

## 2018-01-01 PROCEDURE — 76937 US GUIDE VASCULAR ACCESS: CPT

## 2018-01-01 PROCEDURE — 77030012935 HC DRSG AQUACEL BMS -B

## 2018-01-01 PROCEDURE — 74011000272 HC RX REV CODE- 272: Performed by: INTERNAL MEDICINE

## 2018-01-01 PROCEDURE — 77030008467 HC STPLR SKN COVD -B

## 2018-01-01 PROCEDURE — 85610 PROTHROMBIN TIME: CPT | Performed by: INTERNAL MEDICINE

## 2018-01-01 PROCEDURE — 74011636637 HC RX REV CODE- 636/637: Performed by: HOSPITALIST

## 2018-01-01 PROCEDURE — 77030032994 HC SOL INJ SOD CL 0.9% LFCR 500ML

## 2018-01-01 PROCEDURE — 65660000000 HC RM CCU STEPDOWN

## 2018-01-01 PROCEDURE — 84295 ASSAY OF SERUM SODIUM: CPT

## 2018-01-01 PROCEDURE — A9561 TC99M OXIDRONATE: HCPCS

## 2018-01-01 PROCEDURE — 97166 OT EVAL MOD COMPLEX 45 MIN: CPT

## 2018-01-01 PROCEDURE — 70450 CT HEAD/BRAIN W/O DYE: CPT

## 2018-01-01 PROCEDURE — 97530 THERAPEUTIC ACTIVITIES: CPT

## 2018-01-01 PROCEDURE — 93005 ELECTROCARDIOGRAM TRACING: CPT | Performed by: PHYSICIAN ASSISTANT

## 2018-01-01 PROCEDURE — 74011000258 HC RX REV CODE- 258: Performed by: INTERNAL MEDICINE

## 2018-01-01 PROCEDURE — 81001 URINALYSIS AUTO W/SCOPE: CPT | Performed by: INTERNAL MEDICINE

## 2018-01-01 PROCEDURE — 36600 WITHDRAWAL OF ARTERIAL BLOOD: CPT

## 2018-01-01 PROCEDURE — 81015 MICROSCOPIC EXAM OF URINE: CPT | Performed by: INTERNAL MEDICINE

## 2018-01-01 PROCEDURE — 36415 COLL VENOUS BLD VENIPUNCTURE: CPT | Performed by: INTERNAL MEDICINE

## 2018-01-01 PROCEDURE — 76450000000

## 2018-01-01 PROCEDURE — 85652 RBC SED RATE AUTOMATED: CPT | Performed by: HOSPITALIST

## 2018-01-01 PROCEDURE — 77030032490 HC SLV COMPR SCD KNE COVD -B

## 2018-01-01 PROCEDURE — 99285 EMERGENCY DEPT VISIT HI MDM: CPT | Performed by: EMERGENCY MEDICINE

## 2018-01-01 PROCEDURE — 87040 BLOOD CULTURE FOR BACTERIA: CPT | Performed by: HOSPITALIST

## 2018-01-01 PROCEDURE — 74011250636 HC RX REV CODE- 250/636: Performed by: HOSPITALIST

## 2018-01-01 PROCEDURE — 36415 COLL VENOUS BLD VENIPUNCTURE: CPT | Performed by: NURSE PRACTITIONER

## 2018-01-01 PROCEDURE — 85025 COMPLETE CBC W/AUTO DIFF WBC: CPT | Performed by: HOSPITALIST

## 2018-01-01 PROCEDURE — 74011636637 HC RX REV CODE- 636/637: Performed by: INTERNAL MEDICINE

## 2018-01-01 PROCEDURE — 65620000000 HC RM CCU GENERAL

## 2018-01-01 PROCEDURE — 74011000255 HC RX REV CODE- 255: Performed by: INTERNAL MEDICINE

## 2018-01-01 PROCEDURE — 83605 ASSAY OF LACTIC ACID: CPT

## 2018-01-01 PROCEDURE — 80053 COMPREHEN METABOLIC PANEL: CPT | Performed by: HOSPITALIST

## 2018-01-01 PROCEDURE — 99233 SBSQ HOSP IP/OBS HIGH 50: CPT | Performed by: SURGERY

## 2018-01-01 PROCEDURE — 36415 COLL VENOUS BLD VENIPUNCTURE: CPT | Performed by: HOSPITALIST

## 2018-01-01 PROCEDURE — 93306 TTE W/DOPPLER COMPLETE: CPT

## 2018-01-01 PROCEDURE — 83735 ASSAY OF MAGNESIUM: CPT | Performed by: INTERNAL MEDICINE

## 2018-01-01 PROCEDURE — C1882 AICD, OTHER THAN SING/DUAL: HCPCS

## 2018-01-01 PROCEDURE — 77030028698 HC BLD TISS PLSM MEDT -D

## 2018-01-01 PROCEDURE — 0JH609Z INSERTION OF CARDIAC RESYNCHRONIZATION DEFIBRILLATOR PULSE GENERATOR INTO CHEST SUBCUTANEOUS TISSUE AND FASCIA, OPEN APPROACH: ICD-10-PCS | Performed by: INTERNAL MEDICINE

## 2018-01-01 PROCEDURE — 93005 ELECTROCARDIOGRAM TRACING: CPT | Performed by: INTERNAL MEDICINE

## 2018-01-01 PROCEDURE — 97535 SELF CARE MNGMENT TRAINING: CPT

## 2018-01-01 PROCEDURE — 74011000250 HC RX REV CODE- 250

## 2018-01-01 PROCEDURE — 93922 UPR/L XTREMITY ART 2 LEVELS: CPT

## 2018-01-01 PROCEDURE — 85025 COMPLETE CBC W/AUTO DIFF WBC: CPT | Performed by: EMERGENCY MEDICINE

## 2018-01-01 PROCEDURE — 83735 ASSAY OF MAGNESIUM: CPT | Performed by: EMERGENCY MEDICINE

## 2018-01-01 PROCEDURE — 77030018719 HC DRSG PTCH ANTIMIC J&J -A

## 2018-01-01 PROCEDURE — 33264 RMVL & RPLCMT DFB GEN MLT LD: CPT

## 2018-01-01 PROCEDURE — 5A12012 PERFORMANCE OF CARDIAC OUTPUT, SINGLE, MANUAL: ICD-10-PCS | Performed by: INTERNAL MEDICINE

## 2018-01-01 PROCEDURE — 82803 BLOOD GASES ANY COMBINATION: CPT

## 2018-01-01 PROCEDURE — 99221 1ST HOSP IP/OBS SF/LOW 40: CPT | Performed by: SURGERY

## 2018-01-01 PROCEDURE — 74011250636 HC RX REV CODE- 250/636: Performed by: EMERGENCY MEDICINE

## 2018-01-01 PROCEDURE — 74220 X-RAY XM ESOPHAGUS 1CNTRST: CPT

## 2018-01-01 PROCEDURE — 31500 INSERT EMERGENCY AIRWAY: CPT

## 2018-01-01 PROCEDURE — 80202 ASSAY OF VANCOMYCIN: CPT | Performed by: INTERNAL MEDICINE

## 2018-01-01 PROCEDURE — 77030018786 HC NDL GD F/USND BARD -B

## 2018-01-01 PROCEDURE — 65660000004 HC RM CVT STEPDOWN

## 2018-01-01 PROCEDURE — 77030030708 HC OINT IODOSRB S&N -A

## 2018-01-01 PROCEDURE — 74011250636 HC RX REV CODE- 250/636

## 2018-01-01 PROCEDURE — 86140 C-REACTIVE PROTEIN: CPT | Performed by: HOSPITALIST

## 2018-01-01 PROCEDURE — 36569 INSJ PICC 5 YR+ W/O IMAGING: CPT | Performed by: NURSE PRACTITIONER

## 2018-01-01 PROCEDURE — 76060000034 HC ANESTHESIA 1.5 TO 2 HR: Performed by: INTERNAL MEDICINE

## 2018-01-01 PROCEDURE — 76770 US EXAM ABDO BACK WALL COMP: CPT

## 2018-01-01 PROCEDURE — 74011000302 HC RX REV CODE- 302: Performed by: HOSPITALIST

## 2018-01-01 PROCEDURE — 83735 ASSAY OF MAGNESIUM: CPT | Performed by: HOSPITALIST

## 2018-01-01 PROCEDURE — 02HV33Z INSERTION OF INFUSION DEVICE INTO SUPERIOR VENA CAVA, PERCUTANEOUS APPROACH: ICD-10-PCS | Performed by: NURSE PRACTITIONER

## 2018-01-01 PROCEDURE — 84300 ASSAY OF URINE SODIUM: CPT | Performed by: INTERNAL MEDICINE

## 2018-01-01 PROCEDURE — 93005 ELECTROCARDIOGRAM TRACING: CPT | Performed by: EMERGENCY MEDICINE

## 2018-01-01 PROCEDURE — 80053 COMPREHEN METABOLIC PANEL: CPT | Performed by: INTERNAL MEDICINE

## 2018-01-01 PROCEDURE — 85025 COMPLETE CBC W/AUTO DIFF WBC: CPT | Performed by: INTERNAL MEDICINE

## 2018-01-01 PROCEDURE — C9113 INJ PANTOPRAZOLE SODIUM, VIA: HCPCS | Performed by: NURSE PRACTITIONER

## 2018-01-01 PROCEDURE — 86580 TB INTRADERMAL TEST: CPT | Performed by: HOSPITALIST

## 2018-01-01 PROCEDURE — 0JPT0PZ REMOVAL OF CARDIAC RHYTHM RELATED DEVICE FROM TRUNK SUBCUTANEOUS TISSUE AND FASCIA, OPEN APPROACH: ICD-10-PCS | Performed by: INTERNAL MEDICINE

## 2018-01-01 PROCEDURE — 96360 HYDRATION IV INFUSION INIT: CPT | Performed by: EMERGENCY MEDICINE

## 2018-01-01 PROCEDURE — 74011000250 HC RX REV CODE- 250: Performed by: NURSE PRACTITIONER

## 2018-01-01 RX ORDER — KETOROLAC TROMETHAMINE 15 MG/ML
15 INJECTION, SOLUTION INTRAMUSCULAR; INTRAVENOUS EVERY 6 HOURS
Status: DISCONTINUED | OUTPATIENT
Start: 2018-01-01 | End: 2018-01-01

## 2018-01-01 RX ORDER — INSULIN LISPRO 100 [IU]/ML
INJECTION, SOLUTION INTRAVENOUS; SUBCUTANEOUS
Status: DISCONTINUED | OUTPATIENT
Start: 2018-01-01 | End: 2018-01-01 | Stop reason: HOSPADM

## 2018-01-01 RX ORDER — METRONIDAZOLE 500 MG/1
500 TABLET ORAL EVERY 12 HOURS
Status: DISCONTINUED | OUTPATIENT
Start: 2018-01-01 | End: 2018-01-01 | Stop reason: HOSPADM

## 2018-01-01 RX ORDER — HEPARIN 100 UNIT/ML
300-600 SYRINGE INTRAVENOUS
Status: DISCONTINUED | OUTPATIENT
Start: 2018-01-01 | End: 2018-01-01 | Stop reason: SDUPTHER

## 2018-01-01 RX ORDER — DIPHENHYDRAMINE HYDROCHLORIDE 50 MG/ML
25 INJECTION, SOLUTION INTRAMUSCULAR; INTRAVENOUS
Status: DISCONTINUED | OUTPATIENT
Start: 2018-01-01 | End: 2018-01-01 | Stop reason: HOSPADM

## 2018-01-01 RX ORDER — LATANOPROST 50 UG/ML
1 SOLUTION/ DROPS OPHTHALMIC EVERY EVENING
Status: DISCONTINUED | OUTPATIENT
Start: 2018-01-01 | End: 2018-01-01 | Stop reason: HOSPADM

## 2018-01-01 RX ORDER — ACETAMINOPHEN 325 MG/1
650 TABLET ORAL
Status: DISCONTINUED | OUTPATIENT
Start: 2018-01-01 | End: 2018-01-01 | Stop reason: HOSPADM

## 2018-01-01 RX ORDER — ADHESIVE BANDAGE
30 BANDAGE TOPICAL DAILY PRN
Status: DISCONTINUED | OUTPATIENT
Start: 2018-01-01 | End: 2018-01-01 | Stop reason: HOSPADM

## 2018-01-01 RX ORDER — AMIODARONE HYDROCHLORIDE 200 MG/1
200 TABLET ORAL DAILY
Status: DISCONTINUED | OUTPATIENT
Start: 2018-01-01 | End: 2018-01-01 | Stop reason: HOSPADM

## 2018-01-01 RX ORDER — CEFAZOLIN SODIUM/WATER 2 G/20 ML
2 SYRINGE (ML) INTRAVENOUS EVERY 8 HOURS
Status: COMPLETED | OUTPATIENT
Start: 2018-01-01 | End: 2018-01-01

## 2018-01-01 RX ORDER — ONDANSETRON 2 MG/ML
4 INJECTION INTRAMUSCULAR; INTRAVENOUS
Status: DISCONTINUED | OUTPATIENT
Start: 2018-01-01 | End: 2018-01-01 | Stop reason: HOSPADM

## 2018-01-01 RX ORDER — BRINZOLAMIDE 10 MG/ML
1 SUSPENSION/ DROPS OPHTHALMIC 2 TIMES DAILY
COMMUNITY

## 2018-01-01 RX ORDER — SODIUM CHLORIDE 9 MG/ML
75 INJECTION, SOLUTION INTRAVENOUS CONTINUOUS
Status: DISCONTINUED | OUTPATIENT
Start: 2018-01-01 | End: 2018-01-01 | Stop reason: HOSPADM

## 2018-01-01 RX ORDER — VANCOMYCIN HYDROCHLORIDE 1 G/20ML
INJECTION, POWDER, LYOPHILIZED, FOR SOLUTION INTRAVENOUS EVERY 24 HOURS
Status: DISCONTINUED | OUTPATIENT
Start: 2018-01-01 | End: 2018-01-01 | Stop reason: DRUGHIGH

## 2018-01-01 RX ORDER — SODIUM CHLORIDE 0.9 % (FLUSH) 0.9 %
5-10 SYRINGE (ML) INJECTION AS NEEDED
Status: DISCONTINUED | OUTPATIENT
Start: 2018-01-01 | End: 2018-01-01 | Stop reason: HOSPADM

## 2018-01-01 RX ORDER — PROPOFOL 10 MG/ML
INJECTION, EMULSION INTRAVENOUS
Status: DISCONTINUED | OUTPATIENT
Start: 2018-01-01 | End: 2018-01-01 | Stop reason: HOSPADM

## 2018-01-01 RX ORDER — SODIUM CHLORIDE 9 MG/ML
50 INJECTION, SOLUTION INTRAVENOUS CONTINUOUS
Status: DISCONTINUED | OUTPATIENT
Start: 2018-01-01 | End: 2018-01-01

## 2018-01-01 RX ORDER — AMOXICILLIN 250 MG
2 CAPSULE ORAL
Status: DISCONTINUED | OUTPATIENT
Start: 2018-01-01 | End: 2018-01-01 | Stop reason: HOSPADM

## 2018-01-01 RX ORDER — EPINEPHRINE 1 MG/ML
INJECTION, SOLUTION, CONCENTRATE INTRAVENOUS AS NEEDED
Status: DISCONTINUED | OUTPATIENT
Start: 2018-01-01 | End: 2018-01-01 | Stop reason: HOSPADM

## 2018-01-01 RX ORDER — OXYCODONE HYDROCHLORIDE 5 MG/1
5 TABLET ORAL
Status: CANCELLED | OUTPATIENT
Start: 2018-01-01

## 2018-01-01 RX ORDER — EPINEPHRINE 0.1 MG/ML
INJECTION INTRACARDIAC; INTRAVENOUS
Status: DISCONTINUED | OUTPATIENT
Start: 2018-01-01 | End: 2018-01-01 | Stop reason: HOSPADM

## 2018-01-01 RX ORDER — DIPHENHYDRAMINE HCL 25 MG
25 CAPSULE ORAL
Status: DISCONTINUED | OUTPATIENT
Start: 2018-01-01 | End: 2018-01-01 | Stop reason: HOSPADM

## 2018-01-01 RX ORDER — HEPARIN SODIUM 5000 [USP'U]/ML
5000 INJECTION, SOLUTION INTRAVENOUS; SUBCUTANEOUS EVERY 8 HOURS
Status: DISCONTINUED | OUTPATIENT
Start: 2018-01-01 | End: 2018-01-01 | Stop reason: HOSPADM

## 2018-01-01 RX ORDER — SODIUM CHLORIDE 0.9 % (FLUSH) 0.9 %
10-40 SYRINGE (ML) INJECTION AS NEEDED
Status: DISCONTINUED | OUTPATIENT
Start: 2018-01-01 | End: 2018-01-01 | Stop reason: HOSPADM

## 2018-01-01 RX ORDER — VANCOMYCIN HYDROCHLORIDE
1250 EVERY 24 HOURS
Status: DISCONTINUED | OUTPATIENT
Start: 2018-01-01 | End: 2018-01-01

## 2018-01-01 RX ORDER — SODIUM CHLORIDE 0.9 % (FLUSH) 0.9 %
5-10 SYRINGE (ML) INJECTION EVERY 8 HOURS
Status: DISCONTINUED | OUTPATIENT
Start: 2018-01-01 | End: 2018-01-01 | Stop reason: HOSPADM

## 2018-01-01 RX ORDER — MIDODRINE HYDROCHLORIDE 5 MG/1
2.5 TABLET ORAL
Status: DISCONTINUED | OUTPATIENT
Start: 2018-01-01 | End: 2018-01-01 | Stop reason: HOSPADM

## 2018-01-01 RX ORDER — SODIUM CHLORIDE 9 MG/ML
100 INJECTION, SOLUTION INTRAVENOUS CONTINUOUS
Status: CANCELLED | OUTPATIENT
Start: 2018-01-01

## 2018-01-01 RX ORDER — CALCIUM CHLORIDE INJECTION 100 MG/ML
INJECTION, SOLUTION INTRAVENOUS
Status: DISCONTINUED | OUTPATIENT
Start: 2018-01-01 | End: 2018-01-01 | Stop reason: HOSPADM

## 2018-01-01 RX ORDER — SODIUM CHLORIDE 0.9 % (FLUSH) 0.9 %
10-40 SYRINGE (ML) INJECTION EVERY 8 HOURS
Status: DISCONTINUED | OUTPATIENT
Start: 2018-01-01 | End: 2018-01-01 | Stop reason: HOSPADM

## 2018-01-01 RX ORDER — NOREPINEPHRINE BIT/0.9 % NACL 4MG/250ML
0-16 PLASTIC BAG, INJECTION (ML) INTRAVENOUS
Status: DISCONTINUED | OUTPATIENT
Start: 2018-01-01 | End: 2018-01-01 | Stop reason: HOSPADM

## 2018-01-01 RX ORDER — GUAIFENESIN 100 MG/5ML
81 LIQUID (ML) ORAL DAILY
Status: DISCONTINUED | OUTPATIENT
Start: 2018-01-01 | End: 2018-01-01 | Stop reason: HOSPADM

## 2018-01-01 RX ORDER — SODIUM BICARBONATE 1 MEQ/ML
SYRINGE (ML) INTRAVENOUS
Status: DISCONTINUED | OUTPATIENT
Start: 2018-01-01 | End: 2018-01-01 | Stop reason: HOSPADM

## 2018-01-01 RX ORDER — GUAIFENESIN 100 MG/5ML
81 LIQUID (ML) ORAL
Status: DISCONTINUED | OUTPATIENT
Start: 2018-01-01 | End: 2018-01-01 | Stop reason: HOSPADM

## 2018-01-01 RX ORDER — BRINZOLAMIDE 10 MG/ML
1 SUSPENSION/ DROPS OPHTHALMIC 2 TIMES DAILY
Status: DISCONTINUED | OUTPATIENT
Start: 2018-01-01 | End: 2018-01-01 | Stop reason: HOSPADM

## 2018-01-01 RX ORDER — LIDOCAINE HYDROCHLORIDE 10 MG/ML
0.1 INJECTION INFILTRATION; PERINEURAL AS NEEDED
Status: CANCELLED | OUTPATIENT
Start: 2018-01-01

## 2018-01-01 RX ORDER — CEPHALEXIN 500 MG/1
500 CAPSULE ORAL 3 TIMES DAILY
Qty: 42 CAP | Refills: 0 | Status: SHIPPED | OUTPATIENT
Start: 2018-01-01 | End: 2018-01-01

## 2018-01-01 RX ORDER — FUROSEMIDE 40 MG/1
80 TABLET ORAL DAILY
Status: DISCONTINUED | OUTPATIENT
Start: 2018-01-01 | End: 2018-01-01

## 2018-01-01 RX ORDER — NALOXONE HYDROCHLORIDE 0.4 MG/ML
0.2 INJECTION, SOLUTION INTRAMUSCULAR; INTRAVENOUS; SUBCUTANEOUS AS NEEDED
Status: CANCELLED | OUTPATIENT
Start: 2018-01-01

## 2018-01-01 RX ORDER — ATORVASTATIN CALCIUM 40 MG/1
40 TABLET, FILM COATED ORAL
Status: DISCONTINUED | OUTPATIENT
Start: 2018-01-01 | End: 2018-01-01 | Stop reason: HOSPADM

## 2018-01-01 RX ORDER — METRONIDAZOLE 500 MG/1
500 TABLET ORAL EVERY 8 HOURS
Status: DISCONTINUED | OUTPATIENT
Start: 2018-01-01 | End: 2018-01-01

## 2018-01-01 RX ORDER — SODIUM CHLORIDE, SODIUM LACTATE, POTASSIUM CHLORIDE, CALCIUM CHLORIDE 600; 310; 30; 20 MG/100ML; MG/100ML; MG/100ML; MG/100ML
INJECTION, SOLUTION INTRAVENOUS
Status: DISCONTINUED | OUTPATIENT
Start: 2018-01-01 | End: 2018-01-01 | Stop reason: HOSPADM

## 2018-01-01 RX ORDER — HYDROCODONE BITARTRATE AND ACETAMINOPHEN 5; 325 MG/1; MG/1
1 TABLET ORAL
Status: DISCONTINUED | OUTPATIENT
Start: 2018-01-01 | End: 2018-01-01 | Stop reason: HOSPADM

## 2018-01-01 RX ORDER — SODIUM CHLORIDE, SODIUM LACTATE, POTASSIUM CHLORIDE, CALCIUM CHLORIDE 600; 310; 30; 20 MG/100ML; MG/100ML; MG/100ML; MG/100ML
25 INJECTION, SOLUTION INTRAVENOUS CONTINUOUS
Status: CANCELLED | OUTPATIENT
Start: 2018-01-01 | End: 2018-01-01

## 2018-01-01 RX ORDER — EPHEDRINE SULFATE 50 MG/ML
INJECTION, SOLUTION INTRAVENOUS AS NEEDED
Status: DISCONTINUED | OUTPATIENT
Start: 2018-01-01 | End: 2018-01-01 | Stop reason: HOSPADM

## 2018-01-01 RX ORDER — ACETAMINOPHEN 10 MG/ML
1000 INJECTION, SOLUTION INTRAVENOUS ONCE
Status: COMPLETED | OUTPATIENT
Start: 2018-01-01 | End: 2018-01-01

## 2018-01-01 RX ORDER — SODIUM CHLORIDE 0.9 % (FLUSH) 0.9 %
5-10 SYRINGE (ML) INJECTION EVERY 8 HOURS
Status: DISCONTINUED | OUTPATIENT
Start: 2018-01-01 | End: 2018-01-01

## 2018-01-01 RX ORDER — SODIUM CHLORIDE 0.9 % (FLUSH) 0.9 %
5-10 SYRINGE (ML) INJECTION AS NEEDED
Status: DISCONTINUED | OUTPATIENT
Start: 2018-01-01 | End: 2018-01-01

## 2018-01-01 RX ORDER — FACIAL-BODY WIPES
10 EACH TOPICAL DAILY PRN
Status: DISCONTINUED | OUTPATIENT
Start: 2018-01-01 | End: 2018-01-01 | Stop reason: HOSPADM

## 2018-01-01 RX ORDER — LEVOTHYROXINE SODIUM 50 UG/1
25 TABLET ORAL
Status: DISCONTINUED | OUTPATIENT
Start: 2018-01-01 | End: 2018-01-01 | Stop reason: HOSPADM

## 2018-01-01 RX ORDER — CEFAZOLIN SODIUM/WATER 2 G/20 ML
2 SYRINGE (ML) INTRAVENOUS ONCE
Status: COMPLETED | OUTPATIENT
Start: 2018-01-01 | End: 2018-01-01

## 2018-01-01 RX ORDER — LATANOPROST 50 UG/ML
1 SOLUTION/ DROPS OPHTHALMIC
Status: DISCONTINUED | OUTPATIENT
Start: 2018-01-01 | End: 2018-01-01 | Stop reason: HOSPADM

## 2018-01-01 RX ORDER — DORZOLAMIDE HCL 20 MG/ML
1 SOLUTION/ DROPS OPHTHALMIC 3 TIMES DAILY
Status: DISCONTINUED | OUTPATIENT
Start: 2018-01-01 | End: 2018-01-01 | Stop reason: HOSPADM

## 2018-01-01 RX ORDER — DEXTROSE 50 % IN WATER (D50W) INTRAVENOUS SYRINGE
25-50 AS NEEDED
Status: DISCONTINUED | OUTPATIENT
Start: 2018-01-01 | End: 2018-01-01 | Stop reason: HOSPADM

## 2018-01-01 RX ORDER — VANCOMYCIN HYDROCHLORIDE
1250
Status: COMPLETED | OUTPATIENT
Start: 2018-01-01 | End: 2018-01-01

## 2018-01-01 RX ORDER — LIDOCAINE HYDROCHLORIDE AND EPINEPHRINE 20; 5 MG/ML; UG/ML
20 INJECTION, SOLUTION EPIDURAL; INFILTRATION; INTRACAUDAL; PERINEURAL ONCE
Status: COMPLETED | OUTPATIENT
Start: 2018-01-01 | End: 2018-01-01

## 2018-01-01 RX ORDER — FUROSEMIDE 10 MG/ML
40 INJECTION INTRAMUSCULAR; INTRAVENOUS ONCE
Status: COMPLETED | OUTPATIENT
Start: 2018-01-01 | End: 2018-01-01

## 2018-01-01 RX ORDER — ENOXAPARIN SODIUM 100 MG/ML
30 INJECTION SUBCUTANEOUS EVERY 24 HOURS
Status: DISCONTINUED | OUTPATIENT
Start: 2018-01-01 | End: 2018-01-01 | Stop reason: HOSPADM

## 2018-01-01 RX ORDER — SODIUM CHLORIDE 0.9 % (FLUSH) 0.9 %
5-10 SYRINGE (ML) INJECTION EVERY 8 HOURS
Status: CANCELLED | OUTPATIENT
Start: 2018-01-01

## 2018-01-01 RX ORDER — NALOXONE HYDROCHLORIDE 0.4 MG/ML
0.4 INJECTION, SOLUTION INTRAMUSCULAR; INTRAVENOUS; SUBCUTANEOUS AS NEEDED
Status: DISCONTINUED | OUTPATIENT
Start: 2018-01-01 | End: 2018-01-01 | Stop reason: HOSPADM

## 2018-01-01 RX ORDER — VANCOMYCIN HYDROCHLORIDE
1250 ONCE
Status: COMPLETED | OUTPATIENT
Start: 2018-01-01 | End: 2018-01-01

## 2018-01-01 RX ORDER — AMIODARONE HYDROCHLORIDE 150 MG/3ML
INJECTION, SOLUTION INTRAVENOUS
Status: DISCONTINUED | OUTPATIENT
Start: 2018-01-01 | End: 2018-01-01 | Stop reason: HOSPADM

## 2018-01-01 RX ORDER — SODIUM CHLORIDE 0.9 % (FLUSH) 0.9 %
10 SYRINGE (ML) INJECTION
Status: COMPLETED | OUTPATIENT
Start: 2018-01-01 | End: 2018-01-01

## 2018-01-01 RX ORDER — OXYCODONE AND ACETAMINOPHEN 5; 325 MG/1; MG/1
1 TABLET ORAL
Status: DISCONTINUED | OUTPATIENT
Start: 2018-01-01 | End: 2018-01-01 | Stop reason: HOSPADM

## 2018-01-01 RX ORDER — METRONIDAZOLE 500 MG/1
500 TABLET ORAL EVERY 8 HOURS
Qty: 27 TAB | Refills: 0 | Status: SHIPPED | OUTPATIENT
Start: 2018-01-01 | End: 2018-05-10

## 2018-01-01 RX ORDER — SODIUM CHLORIDE 0.9 % (FLUSH) 0.9 %
5-10 SYRINGE (ML) INJECTION AS NEEDED
Status: CANCELLED | OUTPATIENT
Start: 2018-01-01

## 2018-01-01 RX ORDER — CLOPIDOGREL BISULFATE 75 MG/1
75 TABLET ORAL DAILY
Status: DISCONTINUED | OUTPATIENT
Start: 2018-01-01 | End: 2018-01-01 | Stop reason: HOSPADM

## 2018-01-01 RX ORDER — HYDROMORPHONE HYDROCHLORIDE 2 MG/ML
0.5 INJECTION, SOLUTION INTRAMUSCULAR; INTRAVENOUS; SUBCUTANEOUS
Status: CANCELLED | OUTPATIENT
Start: 2018-01-01

## 2018-01-01 RX ORDER — SODIUM CHLORIDE, SODIUM LACTATE, POTASSIUM CHLORIDE, CALCIUM CHLORIDE 600; 310; 30; 20 MG/100ML; MG/100ML; MG/100ML; MG/100ML
75 INJECTION, SOLUTION INTRAVENOUS CONTINUOUS
Status: CANCELLED | OUTPATIENT
Start: 2018-01-01

## 2018-01-01 RX ORDER — DEXTROSE 40 %
15 GEL (GRAM) ORAL AS NEEDED
Status: DISCONTINUED | OUTPATIENT
Start: 2018-01-01 | End: 2018-01-01 | Stop reason: HOSPADM

## 2018-01-01 RX ORDER — NALOXONE HYDROCHLORIDE 0.4 MG/ML
INJECTION, SOLUTION INTRAMUSCULAR; INTRAVENOUS; SUBCUTANEOUS
Status: DISCONTINUED | OUTPATIENT
Start: 2018-01-01 | End: 2018-01-01 | Stop reason: HOSPADM

## 2018-01-01 RX ORDER — METRONIDAZOLE 500 MG/1
500 TABLET ORAL EVERY 8 HOURS
Status: DISCONTINUED | OUTPATIENT
Start: 2018-01-01 | End: 2018-01-01 | Stop reason: HOSPADM

## 2018-01-01 RX ORDER — DORZOLAMIDE HCL 20 MG/ML
1 SOLUTION/ DROPS OPHTHALMIC 2 TIMES DAILY
Status: DISCONTINUED | OUTPATIENT
Start: 2018-01-01 | End: 2018-01-01 | Stop reason: HOSPADM

## 2018-01-01 RX ORDER — FUROSEMIDE 10 MG/ML
20 INJECTION INTRAMUSCULAR; INTRAVENOUS ONCE
Status: DISCONTINUED | OUTPATIENT
Start: 2018-01-01 | End: 2018-01-01

## 2018-01-01 RX ORDER — POLYETHYLENE GLYCOL 3350 17 G/17G
17 POWDER, FOR SOLUTION ORAL DAILY
Status: DISCONTINUED | OUTPATIENT
Start: 2018-01-01 | End: 2018-01-01 | Stop reason: HOSPADM

## 2018-01-01 RX ADMIN — Medication 2 G: at 02:18

## 2018-01-01 RX ADMIN — Medication 1 AMPULE: at 21:32

## 2018-01-01 RX ADMIN — PIPERACILLIN SODIUM,TAZOBACTAM SODIUM 3.38 G: 3; .375 INJECTION, POWDER, FOR SOLUTION INTRAVENOUS at 17:20

## 2018-01-01 RX ADMIN — KETOROLAC TROMETHAMINE 15 MG: 15 INJECTION, SOLUTION INTRAMUSCULAR; INTRAVENOUS at 06:08

## 2018-01-01 RX ADMIN — EPINEPHRINE 1 MG: 0.1 INJECTION, SOLUTION ENDOTRACHEAL; INTRACARDIAC; INTRAVENOUS at 05:27

## 2018-01-01 RX ADMIN — INSULIN LISPRO 2 UNITS: 100 INJECTION, SOLUTION INTRAVENOUS; SUBCUTANEOUS at 11:53

## 2018-01-01 RX ADMIN — Medication 10 ML: at 06:08

## 2018-01-01 RX ADMIN — INSULIN LISPRO 4 UNITS: 100 INJECTION, SOLUTION INTRAVENOUS; SUBCUTANEOUS at 11:52

## 2018-01-01 RX ADMIN — Medication 10 ML: at 09:08

## 2018-01-01 RX ADMIN — CLOPIDOGREL BISULFATE 75 MG: 75 TABLET ORAL at 08:39

## 2018-01-01 RX ADMIN — PIPERACILLIN SODIUM,TAZOBACTAM SODIUM 3.38 G: 3; .375 INJECTION, POWDER, FOR SOLUTION INTRAVENOUS at 17:06

## 2018-01-01 RX ADMIN — ASPIRIN 81 MG 81 MG: 81 TABLET ORAL at 08:43

## 2018-01-01 RX ADMIN — Medication 10 ML: at 15:32

## 2018-01-01 RX ADMIN — Medication 1 AMPULE: at 21:31

## 2018-01-01 RX ADMIN — ATORVASTATIN CALCIUM 40 MG: 40 TABLET, FILM COATED ORAL at 23:00

## 2018-01-01 RX ADMIN — ASPIRIN 81 MG 81 MG: 81 TABLET ORAL at 08:39

## 2018-01-01 RX ADMIN — AMIODARONE HYDROCHLORIDE 200 MG: 200 TABLET ORAL at 09:21

## 2018-01-01 RX ADMIN — SODIUM CHLORIDE 250 ML: 900 INJECTION, SOLUTION INTRAVENOUS at 03:29

## 2018-01-01 RX ADMIN — BRINZOLAMIDE 1 DROP: 10 SUSPENSION/ DROPS OPHTHALMIC at 10:43

## 2018-01-01 RX ADMIN — ACETAMINOPHEN 650 MG: 325 TABLET, FILM COATED ORAL at 08:58

## 2018-01-01 RX ADMIN — DORZOLAMIDE HYDROCHLORIDE 1 DROP: 20 SOLUTION OPHTHALMIC at 19:56

## 2018-01-01 RX ADMIN — AMIODARONE HYDROCHLORIDE 200 MG: 200 TABLET ORAL at 08:39

## 2018-01-01 RX ADMIN — DORZOLAMIDE HYDROCHLORIDE 1 DROP: 20 SOLUTION OPHTHALMIC at 09:25

## 2018-01-01 RX ADMIN — DORZOLAMIDE HYDROCHLORIDE 1 DROP: 20 SOLUTION OPHTHALMIC at 08:41

## 2018-01-01 RX ADMIN — DORZOLAMIDE HYDROCHLORIDE 1 DROP: 20 SOLUTION OPHTHALMIC at 21:31

## 2018-01-01 RX ADMIN — SODIUM CHLORIDE 75 ML/HR: 900 INJECTION, SOLUTION INTRAVENOUS at 21:24

## 2018-01-01 RX ADMIN — DORZOLAMIDE HYDROCHLORIDE 1 DROP: 20 SOLUTION OPHTHALMIC at 17:05

## 2018-01-01 RX ADMIN — LEVOTHYROXINE SODIUM 25 MCG: 50 TABLET ORAL at 06:41

## 2018-01-01 RX ADMIN — VANCOMYCIN HYDROCHLORIDE 1000 MG: 1 INJECTION, POWDER, LYOPHILIZED, FOR SOLUTION INTRAVENOUS at 12:13

## 2018-01-01 RX ADMIN — SODIUM CHLORIDE 250 ML: 900 INJECTION, SOLUTION INTRAVENOUS at 19:32

## 2018-01-01 RX ADMIN — SODIUM CHLORIDE, SODIUM LACTATE, POTASSIUM CHLORIDE, AND CALCIUM CHLORIDE 1000 ML: 600; 310; 30; 20 INJECTION, SOLUTION INTRAVENOUS at 13:00

## 2018-01-01 RX ADMIN — POLYETHYLENE GLYCOL (3350) 17 G: 17 POWDER, FOR SOLUTION ORAL at 09:01

## 2018-01-01 RX ADMIN — Medication 1 AMPULE: at 22:02

## 2018-01-01 RX ADMIN — SODIUM CHLORIDE 75 ML/HR: 900 INJECTION, SOLUTION INTRAVENOUS at 18:29

## 2018-01-01 RX ADMIN — SODIUM BICARBONATE 50 MEQ: 84 INJECTION, SOLUTION INTRAVENOUS at 05:31

## 2018-01-01 RX ADMIN — OXYCODONE HYDROCHLORIDE AND ACETAMINOPHEN 1 TABLET: 5; 325 TABLET ORAL at 17:56

## 2018-01-01 RX ADMIN — PROPOFOL 100 MCG/KG/MIN: 10 INJECTION, EMULSION INTRAVENOUS at 11:09

## 2018-01-01 RX ADMIN — ASPIRIN 81 MG 81 MG: 81 TABLET ORAL at 09:01

## 2018-01-01 RX ADMIN — ASPIRIN 81 MG 81 MG: 81 TABLET ORAL at 08:58

## 2018-01-01 RX ADMIN — Medication 10 ML: at 14:49

## 2018-01-01 RX ADMIN — BARIUM SULFATE 135 ML: 980 POWDER, FOR SUSPENSION ORAL at 14:54

## 2018-01-01 RX ADMIN — INSULIN LISPRO 4 UNITS: 100 INJECTION, SOLUTION INTRAVENOUS; SUBCUTANEOUS at 12:13

## 2018-01-01 RX ADMIN — SODIUM BICARBONATE 50 MEQ: 84 INJECTION, SOLUTION INTRAVENOUS at 05:35

## 2018-01-01 RX ADMIN — POLYETHYLENE GLYCOL (3350) 17 G: 17 POWDER, FOR SOLUTION ORAL at 08:38

## 2018-01-01 RX ADMIN — ASPIRIN 81 MG 81 MG: 81 TABLET ORAL at 06:21

## 2018-01-01 RX ADMIN — KETOROLAC TROMETHAMINE 15 MG: 15 INJECTION, SOLUTION INTRAMUSCULAR; INTRAVENOUS at 23:12

## 2018-01-01 RX ADMIN — DORZOLAMIDE HYDROCHLORIDE 1 DROP: 20 SOLUTION OPHTHALMIC at 23:00

## 2018-01-01 RX ADMIN — AMIODARONE HYDROCHLORIDE 200 MG: 200 TABLET ORAL at 09:09

## 2018-01-01 RX ADMIN — ATORVASTATIN CALCIUM 40 MG: 40 TABLET, FILM COATED ORAL at 21:51

## 2018-01-01 RX ADMIN — EPINEPHRINE 1 MG: 0.1 INJECTION, SOLUTION ENDOTRACHEAL; INTRACARDIAC; INTRAVENOUS at 05:40

## 2018-01-01 RX ADMIN — ATORVASTATIN CALCIUM 40 MG: 40 TABLET, FILM COATED ORAL at 22:02

## 2018-01-01 RX ADMIN — DORZOLAMIDE HCL 1 DROP: 20 SOLUTION/ DROPS OPHTHALMIC at 17:11

## 2018-01-01 RX ADMIN — FUROSEMIDE 40 MG: 10 INJECTION, SOLUTION INTRAMUSCULAR; INTRAVENOUS at 21:52

## 2018-01-01 RX ADMIN — OXYCODONE HYDROCHLORIDE AND ACETAMINOPHEN 1 TABLET: 5; 325 TABLET ORAL at 07:48

## 2018-01-01 RX ADMIN — DORZOLAMIDE HYDROCHLORIDE 1 DROP: 20 SOLUTION OPHTHALMIC at 18:07

## 2018-01-01 RX ADMIN — Medication 10 ML: at 21:32

## 2018-01-01 RX ADMIN — Medication 10 ML: at 13:00

## 2018-01-01 RX ADMIN — AMIODARONE HYDROCHLORIDE 200 MG: 200 TABLET ORAL at 09:42

## 2018-01-01 RX ADMIN — EPINEPHRINE 0.08 MG: 1 INJECTION, SOLUTION, CONCENTRATE INTRAVENOUS at 12:15

## 2018-01-01 RX ADMIN — Medication 10 ML: at 15:48

## 2018-01-01 RX ADMIN — ENOXAPARIN SODIUM 30 MG: 30 INJECTION SUBCUTANEOUS at 08:43

## 2018-01-01 RX ADMIN — WATER 1 G: 1 INJECTION INTRAMUSCULAR; INTRAVENOUS; SUBCUTANEOUS at 09:10

## 2018-01-01 RX ADMIN — NALOXONE HYDROCHLORIDE 2 MG: 0.4 INJECTION, SOLUTION INTRAMUSCULAR; INTRAVENOUS; SUBCUTANEOUS at 05:34

## 2018-01-01 RX ADMIN — METRONIDAZOLE 500 MG: 500 TABLET ORAL at 10:04

## 2018-01-01 RX ADMIN — METRONIDAZOLE 500 MG: 500 TABLET ORAL at 14:49

## 2018-01-01 RX ADMIN — METRONIDAZOLE 500 MG: 500 TABLET ORAL at 14:14

## 2018-01-01 RX ADMIN — Medication 1 AMPULE: at 09:43

## 2018-01-01 RX ADMIN — CEFTRIAXONE SODIUM 2 G: 2 INJECTION, POWDER, FOR SOLUTION INTRAMUSCULAR; INTRAVENOUS at 09:54

## 2018-01-01 RX ADMIN — Medication 150 MG: at 05:42

## 2018-01-01 RX ADMIN — SODIUM CHLORIDE, SODIUM LACTATE, POTASSIUM CHLORIDE, CALCIUM CHLORIDE: 600; 310; 30; 20 INJECTION, SOLUTION INTRAVENOUS at 10:45

## 2018-01-01 RX ADMIN — CLOPIDOGREL BISULFATE 75 MG: 75 TABLET ORAL at 08:38

## 2018-01-01 RX ADMIN — HEPARIN SODIUM 5000 UNITS: 5000 INJECTION, SOLUTION INTRAVENOUS; SUBCUTANEOUS at 01:58

## 2018-01-01 RX ADMIN — TUBERCULIN PURIFIED PROTEIN DERIVATIVE 5 UNITS: 5 INJECTION, SOLUTION INTRADERMAL at 00:52

## 2018-01-01 RX ADMIN — METRONIDAZOLE 500 MG: 500 TABLET ORAL at 22:30

## 2018-01-01 RX ADMIN — CALCIUM CHLORIDE 1 G: 100 INJECTION, SOLUTION INTRAVENOUS; INTRAVENTRICULAR at 05:32

## 2018-01-01 RX ADMIN — AMIODARONE HYDROCHLORIDE 200 MG: 200 TABLET ORAL at 08:58

## 2018-01-01 RX ADMIN — Medication 1 AMPULE: at 09:04

## 2018-01-01 RX ADMIN — EPINEPHRINE 1 MG: 0.1 INJECTION, SOLUTION ENDOTRACHEAL; INTRACARDIAC; INTRAVENOUS at 05:37

## 2018-01-01 RX ADMIN — EPHEDRINE SULFATE 10 MG: 50 INJECTION, SOLUTION INTRAVENOUS at 11:29

## 2018-01-01 RX ADMIN — VANCOMYCIN HYDROCHLORIDE 1250 MG: 10 INJECTION, POWDER, LYOPHILIZED, FOR SOLUTION INTRAVENOUS at 15:26

## 2018-01-01 RX ADMIN — LEVOTHYROXINE SODIUM 25 MCG: 50 TABLET ORAL at 06:01

## 2018-01-01 RX ADMIN — PIPERACILLIN SODIUM,TAZOBACTAM SODIUM 3.38 G: 3; .375 INJECTION, POWDER, FOR SOLUTION INTRAVENOUS at 02:42

## 2018-01-01 RX ADMIN — Medication 10 ML: at 21:53

## 2018-01-01 RX ADMIN — CLOPIDOGREL BISULFATE 75 MG: 75 TABLET ORAL at 09:21

## 2018-01-01 RX ADMIN — EPINEPHRINE 1 MG: 0.1 INJECTION, SOLUTION ENDOTRACHEAL; INTRACARDIAC; INTRAVENOUS at 05:33

## 2018-01-01 RX ADMIN — EPINEPHRINE 0.05 MG: 1 INJECTION, SOLUTION, CONCENTRATE INTRAVENOUS at 11:37

## 2018-01-01 RX ADMIN — ATORVASTATIN CALCIUM 40 MG: 40 TABLET, FILM COATED ORAL at 21:38

## 2018-01-01 RX ADMIN — ASPIRIN 81 MG 81 MG: 81 TABLET ORAL at 08:38

## 2018-01-01 RX ADMIN — AMIODARONE HYDROCHLORIDE 200 MG: 200 TABLET ORAL at 08:24

## 2018-01-01 RX ADMIN — LEVOTHYROXINE SODIUM 25 MCG: 50 TABLET ORAL at 06:46

## 2018-01-01 RX ADMIN — SODIUM CHLORIDE 75 ML/HR: 900 INJECTION, SOLUTION INTRAVENOUS at 01:56

## 2018-01-01 RX ADMIN — SODIUM CHLORIDE 75 ML/HR: 900 INJECTION, SOLUTION INTRAVENOUS at 18:08

## 2018-01-01 RX ADMIN — SODIUM CHLORIDE 40 MG: 9 INJECTION, SOLUTION INTRAMUSCULAR; INTRAVENOUS; SUBCUTANEOUS at 15:19

## 2018-01-01 RX ADMIN — Medication 10 ML: at 20:27

## 2018-01-01 RX ADMIN — LATANOPROST 1 DROP: 50 SOLUTION OPHTHALMIC at 21:31

## 2018-01-01 RX ADMIN — POLYETHYLENE GLYCOL (3350) 17 G: 17 POWDER, FOR SOLUTION ORAL at 09:21

## 2018-01-01 RX ADMIN — SODIUM CHLORIDE 75 ML/HR: 900 INJECTION, SOLUTION INTRAVENOUS at 17:43

## 2018-01-01 RX ADMIN — AMIODARONE HYDROCHLORIDE 200 MG: 200 TABLET ORAL at 08:38

## 2018-01-01 RX ADMIN — HEPARIN SODIUM 5000 UNITS: 5000 INJECTION, SOLUTION INTRAVENOUS; SUBCUTANEOUS at 08:05

## 2018-01-01 RX ADMIN — Medication 1 AMPULE: at 08:47

## 2018-01-01 RX ADMIN — WATER 1 G: 1 INJECTION INTRAMUSCULAR; INTRAVENOUS; SUBCUTANEOUS at 10:42

## 2018-01-01 RX ADMIN — MIDODRINE HYDROCHLORIDE 2.5 MG: 5 TABLET ORAL at 08:58

## 2018-01-01 RX ADMIN — ATORVASTATIN CALCIUM 40 MG: 40 TABLET, FILM COATED ORAL at 22:05

## 2018-01-01 RX ADMIN — LIDOCAINE HYDROCHLORIDE,EPINEPHRINE BITARTRATE 400 MG: 20; .005 INJECTION, SOLUTION EPIDURAL; INFILTRATION; INTRACAUDAL; PERINEURAL at 11:20

## 2018-01-01 RX ADMIN — LEVOTHYROXINE SODIUM 25 MCG: 50 TABLET ORAL at 06:53

## 2018-01-01 RX ADMIN — ASPIRIN 81 MG 81 MG: 81 TABLET ORAL at 07:56

## 2018-01-01 RX ADMIN — LEVOTHYROXINE SODIUM 25 MCG: 50 TABLET ORAL at 05:52

## 2018-01-01 RX ADMIN — ENOXAPARIN SODIUM 30 MG: 30 INJECTION SUBCUTANEOUS at 09:23

## 2018-01-01 RX ADMIN — ENOXAPARIN SODIUM 30 MG: 30 INJECTION SUBCUTANEOUS at 08:38

## 2018-01-01 RX ADMIN — EPINEPHRINE 0.05 MG: 1 INJECTION, SOLUTION, CONCENTRATE INTRAVENOUS at 11:39

## 2018-01-01 RX ADMIN — PIPERACILLIN SODIUM,TAZOBACTAM SODIUM 3.38 G: 3; .375 INJECTION, POWDER, FOR SOLUTION INTRAVENOUS at 02:22

## 2018-01-01 RX ADMIN — ASPIRIN 81 MG 81 MG: 81 TABLET ORAL at 06:01

## 2018-01-01 RX ADMIN — KETOROLAC TROMETHAMINE 15 MG: 15 INJECTION, SOLUTION INTRAMUSCULAR; INTRAVENOUS at 16:18

## 2018-01-01 RX ADMIN — Medication 2 G: at 11:09

## 2018-01-01 RX ADMIN — PIPERACILLIN SODIUM,TAZOBACTAM SODIUM 3.38 G: 3; .375 INJECTION, POWDER, FOR SOLUTION INTRAVENOUS at 10:48

## 2018-01-01 RX ADMIN — CEFTRIAXONE SODIUM 2 G: 2 INJECTION, POWDER, FOR SOLUTION INTRAMUSCULAR; INTRAVENOUS at 10:09

## 2018-01-01 RX ADMIN — MIDODRINE HYDROCHLORIDE 2.5 MG: 5 TABLET ORAL at 13:30

## 2018-01-01 RX ADMIN — Medication 10 ML: at 02:12

## 2018-01-01 RX ADMIN — ASPIRIN 81 MG 81 MG: 81 TABLET ORAL at 06:53

## 2018-01-01 RX ADMIN — CLOPIDOGREL BISULFATE 75 MG: 75 TABLET ORAL at 08:43

## 2018-01-01 RX ADMIN — SODIUM CHLORIDE 75 ML/HR: 900 INJECTION, SOLUTION INTRAVENOUS at 04:39

## 2018-01-01 RX ADMIN — SODIUM CHLORIDE, SODIUM LACTATE, POTASSIUM CHLORIDE, CALCIUM CHLORIDE: 600; 310; 30; 20 INJECTION, SOLUTION INTRAVENOUS at 12:13

## 2018-01-01 RX ADMIN — Medication 1 AMPULE: at 22:59

## 2018-01-01 RX ADMIN — DORZOLAMIDE HYDROCHLORIDE 1 DROP: 20 SOLUTION OPHTHALMIC at 16:36

## 2018-01-01 RX ADMIN — DORZOLAMIDE HYDROCHLORIDE 1 DROP: 20 SOLUTION OPHTHALMIC at 08:44

## 2018-01-01 RX ADMIN — CLOPIDOGREL BISULFATE 75 MG: 75 TABLET ORAL at 09:01

## 2018-01-01 RX ADMIN — Medication 10 ML: at 15:43

## 2018-01-01 RX ADMIN — INSULIN LISPRO 2 UNITS: 100 INJECTION, SOLUTION INTRAVENOUS; SUBCUTANEOUS at 12:24

## 2018-01-01 RX ADMIN — Medication 10 ML: at 23:07

## 2018-01-01 RX ADMIN — SODIUM CHLORIDE 75 ML/HR: 900 INJECTION, SOLUTION INTRAVENOUS at 11:31

## 2018-01-01 RX ADMIN — SODIUM CHLORIDE 50 ML/HR: 900 INJECTION, SOLUTION INTRAVENOUS at 06:08

## 2018-01-01 RX ADMIN — Medication 10 ML: at 05:52

## 2018-01-01 RX ADMIN — Medication 10 ML: at 15:26

## 2018-01-01 RX ADMIN — LEVOTHYROXINE SODIUM 25 MCG: 50 TABLET ORAL at 05:27

## 2018-01-01 RX ADMIN — LATANOPROST 1 DROP: 50 SOLUTION OPHTHALMIC at 23:00

## 2018-01-01 RX ADMIN — AMIODARONE HYDROCHLORIDE 200 MG: 200 TABLET ORAL at 09:25

## 2018-01-01 RX ADMIN — Medication 1 AMPULE: at 09:54

## 2018-01-01 RX ADMIN — VANCOMYCIN HYDROCHLORIDE 1250 MG: 10 INJECTION, POWDER, LYOPHILIZED, FOR SOLUTION INTRAVENOUS at 11:43

## 2018-01-01 RX ADMIN — Medication 10 ML: at 05:09

## 2018-01-01 RX ADMIN — EPINEPHRINE 1 MG: 1 INJECTION, SOLUTION, CONCENTRATE INTRAVENOUS at 11:44

## 2018-01-01 RX ADMIN — HEPARIN SODIUM 5000 UNITS: 5000 INJECTION, SOLUTION INTRAVENOUS; SUBCUTANEOUS at 17:08

## 2018-01-01 RX ADMIN — LATANOPROST 1 DROP: 50 SOLUTION/ DROPS OPHTHALMIC at 17:34

## 2018-01-01 RX ADMIN — ENOXAPARIN SODIUM 30 MG: 30 INJECTION SUBCUTANEOUS at 09:01

## 2018-01-01 RX ADMIN — LATANOPROST 1 DROP: 50 SOLUTION OPHTHALMIC at 22:02

## 2018-01-01 RX ADMIN — CEFTRIAXONE SODIUM 2 G: 2 INJECTION, POWDER, FOR SOLUTION INTRAMUSCULAR; INTRAVENOUS at 07:56

## 2018-01-01 RX ADMIN — BRINZOLAMIDE 1 DROP: 10 SUSPENSION/ DROPS OPHTHALMIC at 17:34

## 2018-01-01 RX ADMIN — OXYCODONE HYDROCHLORIDE AND ACETAMINOPHEN 1 TABLET: 5; 325 TABLET ORAL at 11:55

## 2018-01-01 RX ADMIN — BARIUM SULFATE 355 ML: 0.6 SUSPENSION ORAL at 14:54

## 2018-01-01 RX ADMIN — Medication 10 ML: at 06:02

## 2018-01-01 RX ADMIN — DORZOLAMIDE HYDROCHLORIDE 1 DROP: 20 SOLUTION OPHTHALMIC at 09:04

## 2018-01-01 RX ADMIN — EPINEPHRINE 1 MG: 1 INJECTION, SOLUTION, CONCENTRATE INTRAVENOUS at 11:49

## 2018-01-01 RX ADMIN — Medication 10 ML: at 14:15

## 2018-01-01 RX ADMIN — EPINEPHRINE 0.02 MG: 1 INJECTION, SOLUTION, CONCENTRATE INTRAVENOUS at 12:09

## 2018-01-01 RX ADMIN — PIPERACILLIN SODIUM,TAZOBACTAM SODIUM 3.38 G: 3; .375 INJECTION, POWDER, FOR SOLUTION INTRAVENOUS at 18:07

## 2018-01-01 RX ADMIN — INSULIN LISPRO 2 UNITS: 100 INJECTION, SOLUTION INTRAVENOUS; SUBCUTANEOUS at 17:05

## 2018-01-01 RX ADMIN — INSULIN LISPRO 2 UNITS: 100 INJECTION, SOLUTION INTRAVENOUS; SUBCUTANEOUS at 21:27

## 2018-01-01 RX ADMIN — SODIUM CHLORIDE 75 ML/HR: 900 INJECTION, SOLUTION INTRAVENOUS at 08:17

## 2018-01-01 RX ADMIN — ASPIRIN 81 MG 81 MG: 81 TABLET ORAL at 09:21

## 2018-01-01 RX ADMIN — VANCOMYCIN HYDROCHLORIDE 1250 MG: 10 INJECTION, POWDER, LYOPHILIZED, FOR SOLUTION INTRAVENOUS at 15:48

## 2018-01-01 RX ADMIN — SODIUM CHLORIDE 50 ML/HR: 900 INJECTION, SOLUTION INTRAVENOUS at 16:40

## 2018-01-01 RX ADMIN — SODIUM CHLORIDE 250 ML: 900 INJECTION, SOLUTION INTRAVENOUS at 21:30

## 2018-01-01 RX ADMIN — HEPARIN SODIUM 5000 UNITS: 5000 INJECTION, SOLUTION INTRAVENOUS; SUBCUTANEOUS at 17:11

## 2018-01-01 RX ADMIN — LEVOTHYROXINE SODIUM 25 MCG: 50 TABLET ORAL at 06:07

## 2018-01-01 RX ADMIN — Medication 10 ML: at 06:00

## 2018-01-01 RX ADMIN — EPINEPHRINE 0.05 MG: 1 INJECTION, SOLUTION, CONCENTRATE INTRAVENOUS at 11:35

## 2018-01-01 RX ADMIN — HEPARIN SODIUM 5000 UNITS: 5000 INJECTION, SOLUTION INTRAVENOUS; SUBCUTANEOUS at 00:58

## 2018-01-01 RX ADMIN — LATANOPROST 1 DROP: 50 SOLUTION OPHTHALMIC at 22:15

## 2018-01-01 RX ADMIN — ATORVASTATIN CALCIUM 40 MG: 40 TABLET, FILM COATED ORAL at 21:30

## 2018-01-01 RX ADMIN — NOREPINEPHRINE BITARTRATE 4 MCG/MIN: 1 INJECTION INTRAVENOUS at 02:00

## 2018-01-01 RX ADMIN — DORZOLAMIDE HYDROCHLORIDE 1 DROP: 20 SOLUTION OPHTHALMIC at 22:02

## 2018-01-01 RX ADMIN — PIPERACILLIN SODIUM,TAZOBACTAM SODIUM 3.38 G: 3; .375 INJECTION, POWDER, FOR SOLUTION INTRAVENOUS at 01:57

## 2018-01-01 RX ADMIN — KETOROLAC TROMETHAMINE 15 MG: 15 INJECTION, SOLUTION INTRAMUSCULAR; INTRAVENOUS at 11:55

## 2018-01-01 RX ADMIN — OXYCODONE HYDROCHLORIDE AND ACETAMINOPHEN 1 TABLET: 5; 325 TABLET ORAL at 07:42

## 2018-01-01 RX ADMIN — DORZOLAMIDE HCL 1 DROP: 20 SOLUTION/ DROPS OPHTHALMIC at 08:05

## 2018-01-01 RX ADMIN — ACETAMINOPHEN 1000 MG: 10 INJECTION, SOLUTION INTRAVENOUS at 13:00

## 2018-01-01 RX ADMIN — ASPIRIN 81 MG 81 MG: 81 TABLET ORAL at 06:07

## 2018-01-01 RX ADMIN — EPHEDRINE SULFATE 10 MG: 50 INJECTION, SOLUTION INTRAVENOUS at 11:33

## 2018-01-01 RX ADMIN — HYDROCODONE BITARTRATE AND ACETAMINOPHEN 1 TABLET: 5; 325 TABLET ORAL at 04:02

## 2018-01-01 RX ADMIN — ATORVASTATIN CALCIUM 40 MG: 40 TABLET, FILM COATED ORAL at 21:31

## 2018-01-01 RX ADMIN — LEVOTHYROXINE SODIUM 25 MCG: 50 TABLET ORAL at 06:37

## 2018-01-01 RX ADMIN — POLYETHYLENE GLYCOL (3350) 17 G: 17 POWDER, FOR SOLUTION ORAL at 08:44

## 2018-01-01 RX ADMIN — ENOXAPARIN SODIUM 30 MG: 30 INJECTION SUBCUTANEOUS at 08:40

## 2018-01-01 RX ADMIN — Medication 10 ML: at 05:57

## 2018-01-01 RX ADMIN — INSULIN LISPRO 2 UNITS: 100 INJECTION, SOLUTION INTRAVENOUS; SUBCUTANEOUS at 21:41

## 2018-01-01 RX ADMIN — Medication 10 ML: at 22:05

## 2018-01-01 RX ADMIN — ATORVASTATIN CALCIUM 40 MG: 40 TABLET, FILM COATED ORAL at 00:52

## 2018-01-01 RX ADMIN — PIPERACILLIN SODIUM,TAZOBACTAM SODIUM 3.38 G: 3; .375 INJECTION, POWDER, FOR SOLUTION INTRAVENOUS at 09:54

## 2018-01-01 RX ADMIN — PIPERACILLIN SODIUM,TAZOBACTAM SODIUM 3.38 G: 3; .375 INJECTION, POWDER, FOR SOLUTION INTRAVENOUS at 09:21

## 2018-01-01 RX ADMIN — ATORVASTATIN CALCIUM 40 MG: 40 TABLET, FILM COATED ORAL at 21:44

## 2018-01-01 RX ADMIN — ATORVASTATIN CALCIUM 40 MG: 40 TABLET, FILM COATED ORAL at 20:55

## 2018-01-01 RX ADMIN — MIDODRINE HYDROCHLORIDE 2.5 MG: 5 TABLET ORAL at 17:33

## 2018-01-01 RX ADMIN — Medication 1 AMPULE: at 00:53

## 2018-01-01 RX ADMIN — METRONIDAZOLE 500 MG: 500 TABLET ORAL at 07:56

## 2018-01-01 RX ADMIN — Medication 10 ML: at 05:27

## 2018-01-01 RX ADMIN — LEVOTHYROXINE SODIUM 25 MCG: 50 TABLET ORAL at 08:39

## 2018-01-01 RX ADMIN — METRONIDAZOLE 500 MG: 500 TABLET ORAL at 21:30

## 2018-01-01 RX ADMIN — Medication 5 ML: at 13:44

## 2018-01-01 RX ADMIN — AMIODARONE HYDROCHLORIDE 200 MG: 200 TABLET ORAL at 08:43

## 2018-01-01 RX ADMIN — SODIUM CHLORIDE 50 ML/HR: 900 INJECTION, SOLUTION INTRAVENOUS at 06:25

## 2018-01-01 RX ADMIN — Medication 1 AMPULE: at 08:48

## 2018-01-01 RX ADMIN — SODIUM CHLORIDE 250 ML: 900 INJECTION, SOLUTION INTRAVENOUS at 10:00

## 2018-01-01 RX ADMIN — SODIUM CHLORIDE 75 ML/HR: 900 INJECTION, SOLUTION INTRAVENOUS at 02:17

## 2018-01-01 RX ADMIN — BRINZOLAMIDE 1 DROP: 10 SUSPENSION/ DROPS OPHTHALMIC at 10:41

## 2018-01-01 RX ADMIN — Medication 10 ML: at 13:33

## 2018-01-01 RX ADMIN — AMIODARONE HYDROCHLORIDE 200 MG: 200 TABLET ORAL at 09:01

## 2018-01-01 RX ADMIN — Medication 10 ML: at 22:30

## 2018-01-01 RX ADMIN — Medication 10 ML: at 09:42

## 2018-01-01 RX ADMIN — Medication 10 ML: at 22:03

## 2018-01-01 RX ADMIN — METRONIDAZOLE 500 MG: 500 TABLET ORAL at 21:44

## 2018-01-01 RX ADMIN — Medication 10 ML: at 21:39

## 2018-01-01 RX ADMIN — Medication 2 G: at 17:27

## 2018-01-01 RX ADMIN — DIPHENHYDRAMINE HYDROCHLORIDE 25 MG: 25 CAPSULE ORAL at 02:49

## 2018-01-01 RX ADMIN — NEOMYCIN AND POLYMYXIN B SULFATES: 40; 200000 IRRIGANT IRRIGATION at 11:20

## 2018-01-01 RX ADMIN — PIPERACILLIN SODIUM,TAZOBACTAM SODIUM 3.38 G: 3; .375 INJECTION, POWDER, FOR SOLUTION INTRAVENOUS at 02:12

## 2018-01-01 RX ADMIN — Medication 10 ML: at 13:10

## 2018-01-01 RX ADMIN — INSULIN HUMAN 2 UNITS: 100 INJECTION, SOLUTION PARENTERAL at 12:26

## 2018-01-01 RX ADMIN — DORZOLAMIDE HYDROCHLORIDE 1 DROP: 20 SOLUTION OPHTHALMIC at 17:20

## 2018-01-01 RX ADMIN — LEVOTHYROXINE SODIUM 25 MCG: 50 TABLET ORAL at 06:22

## 2018-01-01 RX ADMIN — ATORVASTATIN CALCIUM 40 MG: 40 TABLET, FILM COATED ORAL at 20:26

## 2018-01-01 RX ADMIN — POLYETHYLENE GLYCOL (3350) 17 G: 17 POWDER, FOR SOLUTION ORAL at 08:39

## 2018-01-01 RX ADMIN — METRONIDAZOLE 500 MG: 500 TABLET ORAL at 06:37

## 2018-01-24 NOTE — PROGRESS NOTES
Patient pre-assessment complete for ICD generator change with Dr Karlene Helton scheduled for 18 at 10am, arrival time 8am. Patient verified using . Patient instructed to bring all home medications in labeled bottles on the day of procedure. NPO status reinforced. Patient instructed to HOLD plavix x 5 days (last dose 18) & in am HOLD lasix, enalapril, glucovance & HCTZ. Instructed they can take all other medications excluding vitamins & supplements. Patient verbalizes understanding of all instructions & denies any questions at this time.

## 2018-01-25 PROBLEM — I46.9 CARDIAC ARREST (HCC): Status: ACTIVE | Noted: 2018-01-01

## 2018-01-25 PROBLEM — I49.01 VENTRICULAR FIBRILLATION (HCC): Status: ACTIVE | Noted: 2018-01-01

## 2018-01-25 NOTE — PROGRESS NOTES
TRANSFER - OUT REPORT:    Verbal report given to [facility] RN on Дмитрий Aguilera  being transferred to CVICU for urgent transfer       Report consisted of patients Situation, Background, Assessment and   Recommendations(SBAR). Information from the following report(s) Procedure Summary was reviewed with the receiving nurse. Lines:   Peripheral IV 01/25/18 Left Wrist (Active)       Arterial Line 01/25/18 Left Radial artery (Active)        Opportunity for questions and clarification was provided. Patient transported: Intubated and monitored with RN, CRNA and Anesthesiologist at bedside. VSS.

## 2018-01-25 NOTE — PROGRESS NOTES
Respiratory Mechanics completed and are as follows:     Patient extubated to a 40% 40L HFNC. Patient is able to communicate and is negative for stridor. Breath sounds are diminished. No complications with extubation.      Johnna Quigley, RT

## 2018-01-25 NOTE — PROGRESS NOTES
TRANSFER - IN REPORT:    Verbal report received from Kayla Garibay /Amirah RN(name) on [facility]  being received from EP lab(unit) for urgent transfer      Report consisted of patients Situation, Background, Assessment and   Recommendations(SBAR). Information from the following report(s) SBAR, Procedure Summary, Intake/Output, MAR, Med Rec Status and Cardiac Rhythm paced was reviewed with the receiving nurse. Opportunity for questions and clarification was provided. Assessment completed upon patients arrival to unit and care assumed. Received to [facility]. Dr Jose Juan Gray here.

## 2018-01-25 NOTE — IP AVS SNAPSHOT
[facility] 
 
 
 [address] [place] [address] 
[phone] Patient: Suri Rosas MRN: [id number] ZSP:[id number] A check ivan indicates which time of day the medication should be taken. My Medications START taking these medications Instructions Each Dose to Equal  
 Morning Noon Evening Bedtime  
 cephALEXin 500 mg capsule Commonly known as:  Oanh Gayer Your next dose is: Tonight Take 1 Cap by mouth three (3) times daily. 500 mg CONTINUE taking these medications Instructions Each Dose to Equal  
 Morning Noon Evening Bedtime  
 amiodarone 200 mg tablet Commonly known as:  CORDARONE Your next dose is:  Tomorrow Take 1 Tab by mouth daily. 200 mg  
    
  
   
   
   
  
 aspirin 81 mg chewable tablet Your next dose is:  Tomorrow Take 81 mg by mouth every morning. 81 mg  
    
  
   
   
   
  
 atorvastatin 40 mg tablet Commonly known as:  LIPITOR Your next dose is: Tonight Take 1 Tab by mouth nightly. 40 mg  
    
   
   
   
  
  
 AZOPT 1 % ophthalmic suspension Generic drug:  brinzolamide Your next dose is: This evening Administer 1 Drop to both eyes two (2) times a day. 1 Drop  
    
   
   
  
   
  
 clopidogrel 75 mg Tab Commonly known as:  PLAVIX Your next dose is:  Tomorrow Take 1 Tab by mouth daily. 75 mg  
    
  
   
   
   
  
 furosemide 40 mg tablet Commonly known as:  LASIX Your next dose is: Tonight Take 1 Tab by mouth as needed. 40 mg  
    
   
   
  
   
  
 glyBURIDE-metFORMIN 2.5-500 mg per tablet Commonly known as:  Khang Jose Your next dose is:  Tomorrow Take 2 Tabs by mouth Daily (before breakfast). Indications: 5-500  mg  
 2 Tab Iron AspGly-VitC-B12-FA-Ca-Suc Tab capsule Commonly known as:  Lars Lack Your next dose is:  Tomorrow Take 1 Tab by mouth daily. 1 Tab  
    
  
   
   
   
  
 levothyroxine 25 mcg tablet Commonly known as:  SYNTHROID Your next dose is:  Tomorrow Take  by mouth Daily (before breakfast). XALATAN 0.005 % ophthalmic solution Generic drug:  latanoprost  
   
 Administer 1 Drop to both eyes nightly. 1 Drop STOP taking these medications   
 carvedilol 25 mg tablet Commonly known as:  COREG  
   
  
 enalapril 20 mg tablet Commonly known as:  VASOTEC  
   
  
 hydroCHLOROthiazide 12.5 mg tablet Commonly known as:  HYDRODIURIL Where to Get Your Medications Information on where to get these meds will be given to you by the nurse or doctor. ! Ask your nurse or doctor about these medications  
  cephALEXin 500 mg capsule

## 2018-01-25 NOTE — PROCEDURES
PRE-ELECTROPHYSIOLOGY STUDY DIAGNOSES  1. Biventricular implantable cardioverter-defibrillator at electivereplacement interval.  2. Ischemic dilated cardiomyopathy. 3. Ejection fraction 20-25%. 4. Left bundle branch block. 5. Class IV heart failure symptoms. 6.  Initial implantable cardioverter-defibrillator was implanted for primary prevention. 7.  Patient has indications for permanent pacing secondary to resynchronized biventricular therapy   8. On Guideline directed medical therapy maximum dose for >3 months prior to implant. 9. Patient has indications for permanent pacing secondary to >40% RV Paving    PROCEDURES PERFORMED  1. Removal and replacement of a multi-lead biventricular implantable cardioverter-defibrillator. 2. EP Eval of implantable cardioverter-defibrillator lead at the time of replacement    Anesthesia: MAC     Estimated Blood Loss: Less than 10 mL     Specimens: * No specimens in log *     PROCEDURE IN DETAIL: The patient was brought into the EP lab in a fasting  state. The left shoulder was prepped and draped in the usual sterile  fashion, skin anesthetized with lidocaine with epinephrine. Incision was  made over the previous biventricular ICD. Previous biventricular ICD was  removed through blunt dissection techniques. Pocket was irrigated with  triple-antibiotic flush. The leads were connected to a new [employer]  biventricular ICD, model Jewels Gene, serial number [id number]. The  leads and biventricular ICD were then placed in the pocket area. ICD lead was evaluated and showed R waves of 7.6mV and Treshold of 2.5V @ 0.4ms. Pocket was then closed with 2-0 Vicryl, followed by a next layer of 3-0  Vicryl, followed by a superficial layer of staples. The wound was  dressed. The patient developed cardiac arrest during the surgery. He received advanced life support and was stabilized for transfer to CVICU.     IMPRESSION: Successful generator change of a [employer] [facility] biventricular implantable cardioverter-defibrillator

## 2018-01-25 NOTE — H&P
[facility], [address] [place] [address], [address] [place] [address], [address] [place] [address]  PHONE: [phone]    SUBJECTIVE:   Leigh Andersen is a 80 y.o. male seen for a follow up visit regarding the following:     No chief complaint on file. HPI:    Patient with extensive CAD, Sys CHF, Pulm HTN, Sev MR developed cardiac arrest during biv/ICD gen Change today. History:       Cardiac PMH: (Old records have been reviewed and summarized below)  1. Coronary artery disease that is severe in nature. 2. Carotid artery disease. 3. History of ventricular tachycardia. 4. Severe CAD that is not able to be fully revascularized. Plan for medical therapy only. 5. Diabetes that is reported to be uncontrolled in the past.   6. Guidant ICD that appears to be a biventricular ICD that was initially implanted in 2004 with generator change in 2007 . 7. Gen Change With [employer] [facility] Biv/ICD - June 2011  8. ECHO - May 2015 - EF 24%, Mod to Sev MR, RVSP 66mmHg  9.    Gen Change With [employer] [facility] Biv/ICD - Aug 2015    Past Medical History, Past Surgical History, Family history, Social History, and Medications were all reviewed with the patient today and updated as necessary.        No Known Allergies  Patient Active Problem List    Diagnosis    Ventricular fibrillation ([address]    Cardiomyopathy, ischemic    Hyperlipidemia    Carotid stenosis, asymptomatic    Other chronic pulmonary heart diseases    Paroxysmal ventricular tachycardia (HCC)    Chronic systolic heart failure (HCC)    Occlusion and stenosis of carotid artery without mention of cerebral infarction    Automatic implantable cardiac defibrillator in situ    Common variable immunodeficiency ([address]    Peripheral vascular disease, unspecified    Atherosclerosis of native arteries of the extremities with intermittent claudication    Pain in limb    Other primary cardiomyopathies    Diseases of tricuspid valve    Type II or unspecified type diabetes mellitus without mention of complication, not stated as uncontrolled    Essential hypertension, benign    Congestive heart failure, unspecified     Past Medical History:   Diagnosis Date    Arrhythmia 2004    VT    Atherosclerosis of native arteries of the extremities with intermittent claudication 9/16/2015    Automatic implantable cardiac defibrillator in situ 9/16/2015    CAD (coronary artery disease)     inoperable, medical management only    Chronic systolic heart failure ([address] 9/16/2015    Common variable immunodeficiency ([address] 9/16/2015    Congestive heart failure, unspecified 9/16/2015    Diabetes ([facility] x 15 yrs    type 2- sqbs am avg ---hypo at 52's    Diseases of tricuspid valve 9/16/2015    Essential hypertension, benign 9/16/2015    Heart failure (HCC)     EF 25-30%    Hypertension x 1996    controlled with meds    Occlusion and stenosis of carotid artery without mention of cerebral infarction 9/16/2015    Other chronic pulmonary heart diseases 9/16/2015    Other primary cardiomyopathies 9/16/2015    PAD (peripheral artery disease) ([facility])     carotid dz    Pain in limb 9/16/2015    Paroxysmal ventricular tachycardia ([facility] 9/16/2015    Peripheral vascular disease, unspecified 9/16/2015    Type II or unspecified type diabetes mellitus without mention of complication, not stated as uncontrolled 9/16/2015     Past Surgical History:   Procedure Laterality Date    HX HERNIA REPAIR      HX PACEMAKER      defib placements x 2     Family History   Problem Relation Age of Onset    Diabetes Sister      Social History   Substance Use Topics    Smoking status: Never Smoker    Smokeless tobacco: Never Used    Alcohol use No         PHYSICAL EXAM:    Visit Vitals    /69    Pulse 88    Temp 98 °F (36.7 °C)    Resp 16    Ht 5' 8\" (1.727 m)    Wt 165 lb (74.8 kg)    SpO2 96%    BMI 25.09 kg/m2          Wt Readings from Last 5 Encounters:   01/25/18 165 lb (74.8 kg)   01/19/18 165 lb 6.4 oz (75 kg) 07/19/17 182 lb (82.6 kg)   01/18/17 184 lb (83.5 kg)   08/16/16 184 lb 12.8 oz (83.8 kg)       BP Readings from Last 5 Encounters:   01/25/18 113/69   01/19/18 (!) 88/52   07/19/17 90/40   01/18/17 126/74   08/16/16 96/56       General appearance - Alert, well appearing, and in no distress   Mental status - Affect appropriate to mood. Eyes - Sclerae anicteric,  ENMT - Hearing grossly normal bilaterally, Dental hygiene good. Neck - Carotids upstroke normal bilaterally, no bruits, no JVD. Resp - Clear to auscultation, no wheezes, rales or rhonchi, symmetric air entry. Heart - Normal rate, regular rhythm, normal S1, S2, no murmurs, rubs, clicks or gallops. GI - Soft, nontender, nondistended, no masses or organomegaly. Neurological - Grossly intact - normal speech, no focal findings  Musculoskeletal - No joint tenderness, deformity or swelling, no muscular tenderness noted. Extremities - Peripheral pulses normal, no pedal edema, no clubbing or cyanosis. Skin - Normal coloration and turgor. Psych -  oriented to person, place, and time. Medical problems and test results were reviewed with the patient today. Recent Results (from the past 672 hour(s))   EKG, 12 LEAD, INITIAL    Collection Time: 01/25/18  8:23 AM   Result Value Ref Range    Ventricular Rate 67 BPM    Atrial Rate 66 BPM    QRS Duration 188 ms    Q-T Interval 610 ms    QTC Calculation (Bezet) 644 ms    Calculated R Axis -162 degrees    Calculated T Axis 120 degrees    Diagnosis       AV sequential or dual chamber electronic pacemaker  When compared with ECG of 07-AUG-2015 12:33,  Vent.  rate has increased BY   2 BPM  No significant change was found  Confirmed by AUGUST LIGHT (), Kristan Macario ([id number]) on 1/25/2018 8:46:11 AM     CBC W/O DIFF    Collection Time: 01/25/18  8:35 AM   Result Value Ref Range    WBC 7.6 4.3 - 11.1 K/uL    RBC 3.78 (L) 4.23 - 5.67 M/uL    HGB 11.9 (L) 13.6 - 17.2 g/dL    HCT 36.4 (L) 41.1 - 50.3 %    MCV 96.3 79.6 - 97.8 FL    MCH 31.5 26.1 - 32.9 PG    MCHC 32.7 31.4 - 35.0 g/dL    RDW 17.7 (H) 11.9 - 14.6 %    PLATELET 554 (L) 076 - 450 K/uL    MPV 11.0 10.8 - 63.2 FL   METABOLIC PANEL, BASIC    Collection Time: 01/25/18  8:35 AM   Result Value Ref Range    Sodium 141 136 - 145 mmol/L    Potassium 4.2 3.5 - 5.1 mmol/L    Chloride 104 98 - 107 mmol/L    CO2 25 21 - 32 mmol/L    Anion gap 12 7 - 16 mmol/L    Glucose 92 65 - 100 mg/dL    BUN 33 (H) 8 - 23 MG/DL    Creatinine 1.92 (H) 0.8 - 1.5 MG/DL    GFR est AA 43 (L) >60 ml/min/1.73m2    GFR est non-AA 36 (L) >60 ml/min/1.73m2    Calcium 9.0 8.3 - 10.4 MG/DL   PROTHROMBIN TIME + INR    Collection Time: 01/25/18  8:35 AM   Result Value Ref Range    Prothrombin time 14.6 (H) 11.5 - 14.5 sec    INR 1.2     MAGNESIUM    Collection Time: 01/25/18  8:35 AM   Result Value Ref Range    Magnesium 2.3 1.8 - 2.4 mg/dL   GLUCOSE, POC    Collection Time: 01/25/18  8:52 AM   Result Value Ref Range    Glucose (POC) 99 65 - 100 mg/dL   POC CG8I    Collection Time: 01/25/18 12:01 PM   Result Value Ref Range    pH (POC) 7.259 (L) 7.35 - 7.45      pCO2 (POC) 63.0 (H) 35 - 45 MMHG    pO2 (POC) 19 (L) 80 - 105 MMHG    HCO3 (POC) 28.2 (H) 22 - 26 MMOL/L    sO2 (POC) 23 (L) 95 - 98 %    Base excess (POC) 1 mmol/L    Sodium,  136 - 145 MMOL/L    Potassium, POC 4.2 3.5 - 5.1 MMOL/L    Glucose,  (H) 65 - 100 MG/DL    Calcium, ionized (POC) 1.27 1.12 - 1.32 MMOL/L         EKG: (EKG has been independently visualized by me and summarized below)  Electronic dual-chamber pacemaker   Pacemaker ECG, No further analysis     ASSESSMENT and PLAN  1. Cardiac Arrest - [place] to be secondary to end stage cardiomyopathy. Received CPR during surgery. Stabilized on EPI drip. Check ECHO  2. End Stage Chronic systolic heart failure - Wife reports that his cardiac health has been doing poorly over the past few months. Will need to discuss end of life issues in the future.    3. CAD - Severe CAD that is not able to be fully revascularized. Medical therapy only. 4. PULMONARY HTN - Severe when measured in 2015 >66mmHg  5. VENTRICULAR TACHYCARDIA -  Chronic Amio 200mg po qam  6. CHRONIC SYSTOLIC HEART FAILURE  Stable symptoms - Added HCTZ 12.5mg qam  7. Biv/ICD - Device was implanted by and outside EP group. Has poor Atrial and LV lead thresholds. His age and other issues make replacement of leads high risk. Post Gen Change with a [employer] [facility] Biv/ICD. Had cardiac arrest during surgery, pocket was closed rapidly. Will treat with Vancomycin and Ancef for 72 hrs then Keflex for 14 days         Follow-up Disposition: Not on File      Ronnell Goins M.D., F.A.C.C, F.H.R.S.   Cardiology/Electrophysiology  1/25/2018  9:12 AM

## 2018-01-25 NOTE — IP AVS SNAPSHOT
[address] 
[phone] Patient: Deborah Carrero MRN: [id number] GYN:[id number] About your hospitalization You were admitted on:  January 25, 2018 You last received care in the:  [facility] 2 CV STEPDOWN You were discharged on:  January 30, 2018 Why you were hospitalized Your primary diagnosis was:  Cardiac Arrest (Hcc) Your diagnoses also included:  Ventricular Fibrillation (Hcc), Paroxysmal Ventricular Tachycardia (Hcc), Chronic Systolic Heart Failure (Hcc), Hyperlipidemia, Essential Hypertension, Benign, Acute Renal Failure Superimposed On Stage 3 Chronic Kidney Disease (Hcc) Follow-up Information Follow up With Details Comments Contact Info Suri Esparza MD On 2/8/2018 TC-7 at 65 in [place] office , per Carol Ann ramirez device check as well.  [address] [place] [address] 
[phone] [facility], DO  As needed [address] [place] [id number] 
[phone] Your Scheduled Appointments Thursday February 08, 2018 10:30 AM EST  
OFFICE DEVICE CHECKS with [facility] ED OFFICE ([address] [place] [address]) [address] 
[address] [place] [address]  
[phone] This upcoming device check will take place IN OUR OFFICE. Please arrive 15 minutes early to review any necessary paperwork requirements. If you have any further questions or need to change this appointment, we are happy to help and can be reached at [phone]. Thursday February 08, 2018 10:45 AM EST TRANSITIONAL CARE MANAGEMENT with Suri Esparza MD  
[facility] ED OFFICE ([address] [place] [address]) [address] 
[address] [place] [address]  
[phone] Discharge Orders Procedure Order Date Status Priority Quantity Spec Type Associated Dx METABOLIC PANEL, BASIC 74/86/11 1517 Future Routine 1 Serum Comments:  Please do Feb 7th  (one day prior to appt with Dr. Rebecca Ramires) Dx CHANTE/CKD A check ivan indicates which time of day the medication should be taken. My Medications START taking these medications Instructions Each Dose to Equal  
 Morning Noon Evening Bedtime  
 cephALEXin 500 mg capsule Commonly known as:  Desiree Ospina Your next dose is: Tonight Take 1 Cap by mouth three (3) times daily. 500 mg CONTINUE taking these medications Instructions Each Dose to Equal  
 Morning Noon Evening Bedtime  
 amiodarone 200 mg tablet Commonly known as:  CORDARONE Your next dose is:  Tomorrow Take 1 Tab by mouth daily. 200 mg  
    
  
   
   
   
  
 aspirin 81 mg chewable tablet Your next dose is:  Tomorrow Take 81 mg by mouth every morning. 81 mg  
    
  
   
   
   
  
 atorvastatin 40 mg tablet Commonly known as:  LIPITOR Your next dose is: Tonight Take 1 Tab by mouth nightly. 40 mg  
    
   
   
   
  
  
 AZOPT 1 % ophthalmic suspension Generic drug:  brinzolamide Your next dose is: This evening Administer 1 Drop to both eyes two (2) times a day. 1 Drop  
    
   
   
  
   
  
 clopidogrel 75 mg Tab Commonly known as:  PLAVIX Your next dose is:  Tomorrow Take 1 Tab by mouth daily. 75 mg  
    
  
   
   
   
  
 furosemide 40 mg tablet Commonly known as:  LASIX Your next dose is: Tonight Take 1 Tab by mouth as needed. 40 mg  
    
   
   
  
   
  
 glyBURIDE-metFORMIN 2.5-500 mg per tablet Commonly known as:  Ozella Fontan Your next dose is:  Tomorrow Take 2 Tabs by mouth Daily (before breakfast). Indications: 5-500  mg  
 2 Tab Iron AspGly-VitC-B12-FA-Ca-Suc Tab capsule Commonly known as:  Leyla Norwood Your next dose is:  Tomorrow Take 1 Tab by mouth daily. 1 Tab  
    
  
   
   
   
  
 levothyroxine 25 mcg tablet Commonly known as:  SYNTHROID Your next dose is:  Tomorrow Take  by mouth Daily (before breakfast). XALATAN 0.005 % ophthalmic solution Generic drug:  latanoprost  
   
 Administer 1 Drop to both eyes nightly. 1 Drop STOP taking these medications   
 carvedilol 25 mg tablet Commonly known as:  COREG  
   
  
 enalapril 20 mg tablet Commonly known as:  VASOTEC  
   
  
 hydroCHLOROthiazide 12.5 mg tablet Commonly known as:  HYDRODIURIL Where to Get Your Medications Information on where to get these meds will be given to you by the nurse or doctor. ! Ask your nurse or doctor about these medications  
  cephALEXin 500 mg capsule Discharge Instructions Learning About a Pacemaker for Heart Failure What is a pacemaker for heart failure? A pacemaker is a small device that is placed under the skin of your chest. It is powered by batteries. It has thin wires, called leads, that pass through a vein into your heart. A pacemaker for heart failure is a biventricular pacemaker (say \"by-karon-TRICK-francis-naida\"). Treatment that uses this type of pacemaker is called cardiac resynchronization therapy (CRT). When you have heart failure, the lower chambers of your heart may not pump at the same time. The pacemaker sends painless electrical signals to your heart. These signals make the chambers pump at the same time. This can help your heart pump blood better and help you feel better. Your pacemaker may be combined with an ICD, or implantable cardioverter-defibrillator. It can control abnormal heart rhythms. This can prevent sudden death. You may feel worried about having a pacemaker. This is common. It can help if you learn about how the pacemaker helps your heart. Talk to your doctor about your concerns. How is a pacemaker put in place? You will get medicine before the procedure. This helps you relax and helps prevent pain. The doctor makes a cut in the skin just below your collarbone. The cut may be on either side of your chest. The doctor will put the pacemaker leads through the cut. The leads go into a large blood vessel in the upper chest. Then the doctor will guide the leads through the blood vessel into different chambers of the heart. The doctor will place the pacemaker under the skin of your chest. He or she will attach the leads to the pacemaker. Then the cut will be closed with stitches. The procedure usually takes 2 to 3 hours. You may need to spend the night in the hospital. 
What can you expect when you have a pacemaker? A pacemaker can help your heart pump blood better. It may help you feel better so you can be more active. It also may help keep you out of the hospital and help you live longer. You can live a normal, active life with a pacemaker. But you need to avoid strong magnetic and electrical fields. Your doctor or the maker of your pacemaker can give you a full list of things to avoid. But most everyday appliances are safe. Your doctor will check your pacemaker regularly to make sure it's working right. Pacemaker batteries usually last 5 to 15 years before they need to be replaced. Follow-up care is a key part of your treatment and safety. Be sure to make and go to all appointments, and call your doctor if you are having problems. It's also a good idea to know your test results and keep a list of the medicines you take. Where can you learn more? Go to http://jessie-anne.info/. Enter Y169 in the search box to learn more about \"Learning About a Pacemaker for Heart Failure. \" Current as of: September 21, 2016 Pacemaker Placement: What to Expect at [facility] Your Recovery Pacemaker placement is surgery to put a pacemaker in your chest. This surgery may be done if you have bradycardia (a slow heart rate). A pacemaker is a small, battery-powered device. It sends electrical signals to the heart. These signals work to keep the heartbeat steady. Thin wires, called leads, carry the signals from the pacemaker to the heart. A pacemaker can prevent or reduce dizziness, fainting, and shortness of breath caused by a slow or unsteady heartbeat. Your chest may be sore where the doctor made the cut (incision) and put in the pacemaker. You also may have a bruise and mild swelling. These symptoms usually get better in 1 to 2 weeks. You may feel a hard ridge along the incision. This usually gets softer in the months after surgery. You may be able to see or feel the outline of the pacemaker under your skin. You will probably be able to go back to work or your usual routine 1 to 2 weeks after surgery. Pacemaker batteries usually last 5 to 15 years. Your doctor will talk to you about how often you will need to have your pacemaker checked. You can safely use most household and office electronics. This includes things such as kitchen appliances, electric power tools, and computers. You will need to stay away from things with strong magnetic and electrical fields. This includes things such as an MRI machine (unless your pacemaker is safe for an MRI), welding equipment, and power generators. You can use a cell phone, but keep it at least 6 inches away from your pacemaker. Check with your doctor about what you need to stay away from, what you need to use with care, and what is okay to use. This care sheet gives you a general idea about how long it will take for you to recover. But each person recovers at a different pace. Follow the steps below to get better as quickly as possible. How can you care for yourself at home? Activity ? · Rest when you feel tired. ? · Be active. Walking is a good choice. ? · For 4 to 6 weeks: ¨ Avoid activities that strain your chest or upper arm muscles. This includes pushing a  or vacuum, or mopping floors. It also includes swimming, or swinging a golf club or tennis racquet. ¨ Do not raise your arm (the one on the side of your body where the pacemaker is located) above your shoulder. ¨ Allow your body to heal. Don't move quickly or lift anything heavy until you are feeling better. ? · Many people are able to return to work within 1 to 2 weeks after surgery. ? · Ask your doctor when it is okay for you to have sex. Diet ? · You can eat your normal diet. If your stomach is upset, try bland, low-fat foods like plain rice, broiled chicken, toast, and yogurt. Medicines ? · Your doctor will tell you if and when you can restart your medicines. He or she will also give you instructions about taking any new medicines. ? · If you take aspirin or some other blood thinner, be sure to talk to your doctor. He or she will tell you if and when to start taking this medicine again. Make sure that you understand exactly what your doctor wants you to do. ? · Be safe with medicines. Read and follow all instructions on the label. ¨ If the doctor gave you a prescription medicine for pain, take it as prescribed. ¨ If you are not taking a prescription pain medicine, ask your doctor if you can take an over-the-counter medicine. ¨ Do not take aspirin, ibuprofen (Advil, Motrin), naproxen (Aleve), or other nonsteroidal anti-inflammatory drugs (NSAIDs) unless your doctor says it is okay. ? · If your doctor prescribed antibiotics, take them as directed. Do not stop taking them just because you feel better. You need to take the full course of antibiotics. Incision care ? · If you have strips of tape on the incision, leave the tape on for a week or until it falls off.  
? · Keep the incision dry while it heals.  Your doctor may recommend sponge baths for about 7 days, but do not get the incision wet. Your doctor will let you know when you may take showers. After a shower, pat the incision dry. ? · Don't use hydrogen peroxide or alcohol on the incision, which can slow healing. You may cover the area with a gauze bandage if it oozes fluid or rubs against clothing. Change the bandage every day. ? · Do not take a bath or get into a hot tub for the first 2 weeks, or until your doctor tells you it is okay. Other instructions ? · Keep a medical ID card with you at all times that says you have a pacemaker. The card should include the  and model information. ? · Wear medical alert jewelry stating that you have a pacemaker. You can buy this at most [employer]. ? · Check your pulse as directed by your doctor. ? · Have your pacemaker checked as often as your doctor recommends. In some cases, this may be done over the phone or the Internet. Your doctor will give you instructions about how to do this. Follow-up care is a key part of your treatment and safety. Be sure to make and go to all appointments, and call your doctor if you are having problems. It's also a good idea to know your test results and keep a list of the medicines you take. When should you call for help? Call 911 anytime you think you may need emergency care. For example, call if: 
? · You passed out (lost consciousness). ? · You have trouble breathing. ?Call your doctor now or seek immediate medical care if: 
? · You are dizzy or light-headed, or you feel like you may faint. ? · You have pain that does not get better after you take pain medicine. ? · You hear an alarm or feel a vibration from your pacemaker. ? · You have loose stitches, or your incision comes open. ? · Bright red blood has soaked through the bandage over your incision. ? · You have signs of infection, such as: 
¨ Increased pain, swelling, warmth, or redness. ¨ Red streaks leading from the incision. ¨ Pus draining from the incision. ¨ A fever. ? Watch closely for changes in your health, and be sure to contact your doctor if: 
? · You have any problems with your pacemaker. Where can you learn more? Go to http://jessie-anne.info/. Enter G550 in the search box to learn more about \"Pacemaker Placement: What to Expect at Home. \" Current as of: September 21, 2016 Content Version: 11.4 © 1424-8614 [employer]. Care instructions adapted under license by [employer] (which disclaims liability or warranty for this information). If you have questions about a medical condition or this instruction, always ask your healthcare professional. [address] any warranty or liability for your use of this information. Chronic Kidney Disease: Care Instructions Your Care Instructions Chronic kidney disease happens when your kidneys don't work as well as they should. Your kidneys have a few important jobs. They remove waste from your blood. This waste leaves your body in your urine. They also balance your body's fluids and chemicals. When your kidneys don't work well, extra waste and fluid can build up. This can poison the body and sometimes cause death. The most common causes of this disease are diabetes and high blood pressure. In some cases, the disease develops in 2 to 3 months. But it usually develops over many years. If you take medicine and make healthy changes to your lifestyle, you may be able to prevent the disease from getting worse. But if your kidney damage gets worse, you may need dialysis or a kidney transplant. Dialysis uses a machine to filter waste from the blood. A transplant is surgery to give you a healthy kidney from another person. Follow-up care is a key part of your treatment and safety.  Be sure to make and go to all appointments, and call your doctor if you are having problems. It's also a good idea to know your test results and keep a list of the medicines you take. How can you care for yourself at home? ? Treatments and appointments ? · Be safe with medicines. Take your medicines exactly as prescribed. Call your doctor if you have any problems with your medicine. You also may take medicine to control your blood pressure or to treat diabetes. Many people who have diabetes take blood pressure medicine. ? · If you have diabetes, do your best to keep your blood sugar in your target range. You may do this by eating healthy food and exercising. You may also take medicines. ? · Go to your dialysis appointments if you have this treatment. ? · Do not take ibuprofen (Advil, Motrin), naproxen (Aleve), or similar medicines, unless your doctor tells you to. These may make the disease worse. ? · Do not take any vitamins, over-the-counter medicines, or herbal products without talking to your doctor first.  
? · Do not smoke or use other tobacco products. Smoking can reduce blood flow to the kidneys. If you need help quitting, talk to your doctor about stop-smoking programs and medicines. These can increase your chances of quitting for good. ? · Do not drink alcohol or use illegal drugs. ? · Talk to your doctor about an exercise plan. Exercise helps lower your blood pressure. It also makes you feel better. ? · If you have an advance directive, let your doctor know. It may include a living will and a durable power of  for health care. If you don't have one, you may want to prepare one. It lets your doctor and loved ones know your health care wishes if you become unable to speak for yourself. Diet ? · Talk to a registered dietitian. He or she can help you make a meal plan that is right for you. Most people with kidney disease need to limit salt (sodium), fluids, and protein. Some also have to limit potassium and phosphorus. ? · You may have to give up many foods you like. But try to focus on the fact that this will help you stay healthy for as long as possible. ? · If you have a hard time eating enough, talk to your doctor or dietitian about ways to add calories to your diet. ? · Your diet may change as your disease changes. See your doctor for regular testing. And work with a dietitian to change your diet as needed. When should you call for help? Call 911 anytime you think you may need emergency care. For example, call if: 
? · You passed out (lost consciousness). ?Call your doctor now or seek immediate medical care if: 
? · You have less urine than normal or no urine. ? · You have trouble urinating or can urinate only very small amounts. ? · You are confused or have trouble thinking clearly. ? · You feel weaker or more tired than usual.  
? · You are very thirsty, lightheaded, or dizzy. ? · You have nausea and vomiting. ? · You have new swelling of your arms or feet, or your swelling is worse. ? · You have blood in your urine. ? · You have new or worse trouble breathing. ? Watch closely for changes in your health, and be sure to contact your doctor if: 
? · You have any problems with your medicine or other treatment. Where can you learn more? Go to http://jessie-anne.info/. Enter N276 in the search box to learn more about \"Chronic Kidney Disease: Care Instructions. \" Current as of: May 12, 2017 Content Version: 11.4 © 2775-0305 [employer]. Care instructions adapted under license by [employer] (which disclaims liability or warranty for this information). If you have questions about a medical condition or this instruction, always ask your healthcare professional. [place] [address] any warranty or liability for your use of this information. Pacemaker Placement for Heart Failure: What to Expect at Home Your Recovery A pacemaker for heart failure is a biventricular pacemaker (say \"denise-karon-TRICK-yuh-ler\"). Treatment that uses this type of pacemaker is called cardiac resynchronization therapy (CRT). When you have heart failure, the lower chambers of your heart may not pump at the same time. The pacemaker sends painless electrical signals to your heart. These signals make the chambers pump at the same time. This can help your heart pump blood better and help you feel better. Your pacemaker may be combined with an ICD, or implantable cardioverter-defibrillator. It can control abnormal heart rhythms. This can prevent sudden death. Your chest may be sore where the doctor made the cut (incision) and put in the pacemaker. You also may have a bruise and mild swelling. These symptoms usually get better in 1 to 2 weeks. You may feel a hard ridge along the incision. This usually gets softer in the months after surgery. You may be able to see or feel the outline of the pacemaker under your skin. You will probably be able to go back to work or your usual routine 1 to 2 weeks after surgery. Pacemaker batteries usually last 5 to 15 years. Your doctor will talk to you about how often you will need to have your pacemaker checked. You can safely use most household and office electronics. This includes things such as kitchen appliances, electric power tools, and computers. You will need to stay away from things with strong magnetic and electrical fields. This includes things such as an MRI machine (unless your pacemaker is safe for an MRI), welding equipment, and power generators. You can use a cell phone, but keep it at least 6 inches away from your pacemaker. Check with your doctor about what you need to stay away from, what you need to use with care, and what is okay to use. This care sheet gives you a general idea about how long it will take for you to recover. But each person recovers at a different pace.  Follow the steps below to get better as quickly as possible. How can you care for yourself at home? Activity ? · Rest when you feel tired. ? · Be active. Walking is a good choice. ? · For 4 to 6 weeks: ¨ Avoid activities that strain your chest or upper arm muscles. This includes pushing a  or vacuum, mopping floors, swimming, or swinging a golf club or tennis racquet. ¨ Do not raise your arm (the one on the side of your body where the pacemaker is located) above your shoulder. ¨ Allow your body to heal. Don't move quickly or lift anything heavy until you are feeling better. ? · Many people are able to return to work within 1 to 2 weeks after surgery. ? · Ask your doctor when it is okay for you to have sex. Diet ? · You can eat your normal diet. If your stomach is upset, try bland, low-fat foods like plain rice, broiled chicken, toast, and yogurt. Medicines ? · Your doctor will tell you if and when you can restart your medicines. He or she will also give you instructions about taking any new medicines. ? · If you take aspirin or some other blood thinner, be sure to talk to your doctor. He or she will tell you if and when to start taking this medicine again. Make sure that you understand exactly what your doctor wants you to do. ? · Be safe with medicines. Read and follow all instructions on the label. ¨ If the doctor gave you a prescription medicine for pain, take it as prescribed. ¨ If you are not taking a prescription pain medicine, ask your doctor if you can take an over-the-counter medicine. ¨ Do not take aspirin, ibuprofen (Advil, Motrin), naproxen (Aleve), or other nonsteroidal anti-inflammatory drugs (NSAIDs) unless your doctor says it is okay. ? · If your doctor prescribed antibiotics, take them as directed. Do not stop taking them just because you feel better. You need to take the full course of antibiotics. Incision care ? · If you have strips of tape on the incision, leave the tape on for a week or until it falls off.  
? · Keep the incision dry while it heals. Your doctor may recommend sponge baths for about 7 days, but do not get the incision wet. Your doctor will let you know when you may take showers. After a shower, pat the incision dry. ? · Don't use hydrogen peroxide or alcohol on the incision, which can slow healing. You may cover the area with a gauze bandage if it oozes fluid or rubs against clothing. Change the bandage every day. ? · Do not take a bath or get into a hot tub for the first 2 weeks, or until your doctor tells you it is okay. Other instructions ? · Keep a medical ID card with you at all times that says you have a pacemaker. The card should include the  and model information. ? · Wear medical alert jewelry stating that you have a pacemaker. You can buy this at most [employer]. ? · Check your pulse as directed by your doctor. ? · Have your pacemaker checked as often as your doctor recommends. In some cases, this may be done over the phone or the Internet. Your doctor will give you instructions about how to do this. Follow-up care is a key part of your treatment and safety. Be sure to make and go to all appointments, and call your doctor if you are having problems. It's also a good idea to know your test results and keep a list of the medicines you take. When should you call for help? Call 911 anytime you think you may need emergency care. For example, call if: 
? · You passed out (lost consciousness). ? · You have trouble breathing. ?Call your doctor now or seek immediate medical care if: 
? · You are dizzy or light-headed, or you feel like you may faint. ? · You have pain that does not get better after you take pain medicine. ? · You hear an alarm or feel a vibration from your pacemaker. ? · You have loose stitches, or your incision comes open. ? · Bright red blood has soaked through the bandage over your incision. ? · You have signs of infection, such as: 
¨ Increased pain, swelling, warmth, or redness. ¨ Red streaks leading from the incision. ¨ Pus draining from the incision. ¨ A fever. ? Watch closely for changes in your health, and be sure to contact your doctor if: 
? · You have any problems with your pacemaker. Where can you learn more? Go to http://jessie-anne.info/. Enter T984 in the search box to learn more about \"Pacemaker Placement for Heart Failure: What to Expect at Home. \" Current as of: September 21, 2016 Content Version: 11.4 © 3868-9068 [employer]. Care instructions adapted under license by [employer] (which disclaims liability or warranty for this information). If you have questions about a medical condition or this instruction, always ask your healthcare professional. [place] [address] any warranty or liability for your use of this information. A Healthy Heart: Care Instructions Your Care Instructions Heart disease occurs when a substance called plaque builds up in the vessels that supply oxygen-rich blood to your heart. This can narrow the blood vessels and reduce blood flow. A heart attack happens when blood flow is completely blocked. A high-fat diet, smoking, and other factors increase the risk of heart disease. Your doctor has found that you have a chance of having heart disease. You can do lots of things to keep your heart healthy. It may not be easy, but you can change your diet, exercise more, and quit smoking. These steps really work to lower your chance of heart disease. Follow-up care is a key part of your treatment and safety. Be sure to make and go to all appointments, and call your doctor if you are having problems. It's also a good idea to know your test results and keep a list of the medicines you take. How can you care for yourself at home? Diet ? · Use less salt when you cook and eat. This helps lower your blood pressure. Taste food before salting. Add only a little salt when you think you need it. With time, your taste buds will adjust to less salt. ? · Eat fewer snack items, fast foods, canned soups, and other high-salt, high-fat, processed foods. ? · Read food labels and try to avoid saturated and trans fats. They increase your risk of heart disease by raising cholesterol levels. ? · Limit the amount of solid fat-butter, margarine, and shortening-you eat. Use olive, peanut, or canola oil when you cook. Bake, broil, and steam foods instead of frying them. ? · Eating fish can lower your risk for heart disease. Eat at least 2 servings of fish a week. [place], mackerel, herring, sardines, and chunk light tuna are very good choices. These fish contain omega-3 fatty acids. ? · Eat a variety of fruit and vegetables every day. Dark green, deep orange, red, or yellow fruits and vegetables are especially good for you. Examples include spinach, carrots, peaches, and berries. ? · Foods high in fiber can reduce your cholesterol and provide important vitamins and minerals. High-fiber foods include whole-grain cereals and breads, oatmeal, beans, brown rice, citrus fruits, and apples. ? · Limit drinks and foods with added sugar. These include candy, desserts, and soda pop. ? Lifestyle changes ? · If your doctor recommends it, get more exercise. Walking is a good choice. Bit by bit, increase the amount you walk every day. Try for at least 30 minutes on most days of the week. You also may want to swim, bike, or do other activities. ? · Do not smoke. If you need help quitting, talk to your doctor about stop-smoking programs and medicines. These can increase your chances of quitting for good.  Quitting smoking may be the most important step you can take to protect your heart. It is never too late to quit. You will get health benefits right away. ? · Limit alcohol to 2 drinks a day for men and 1 drink a day for women. Too much alcohol can cause health problems. Medicines ? · Take your medicines exactly as prescribed. Call your doctor if you think you are having a problem with your medicine. ? · If your doctor recommends aspirin, take the amount directed each day. Make sure you take aspirin and not another kind of pain reliever, such as acetaminophen (Tylenol). If you take ibuprofen (such as Advil or Motrin) for other problems, take aspirin at least 2 hours before taking ibuprofen. When should you call for help? Call 911 if you have symptoms of a heart attack. These may include: 
? · Chest pain or pressure, or a strange feeling in the chest.  
? · Sweating. ? · Shortness of breath. ? · Pain, pressure, or a strange feeling in the back, neck, jaw, or upper belly or in one or both shoulders or arms. ? · Lightheadedness or sudden weakness. ? · A fast or irregular heartbeat. ? After you call 911, the  may tell you to chew 1 adult-strength or 2 to 4 low-dose aspirin. Wait for an ambulance. Do not try to drive yourself. ? Watch closely for changes in your health, and be sure to contact your doctor if you have any problems. Where can you learn more? Go to http://jessie-anne.info/. Enter S243 in the search box to learn more about \"A Healthy Heart: Care Instructions. \" Current as of: September 21, 2016 Content Version: 11.4 © 8410-3591 [employer]. Care instructions adapted under license by [employer] (which disclaims liability or warranty for this information). If you have questions about a medical condition or this instruction, always ask your healthcare professional. [address] any warranty or liability for your use of this information. Asthmatx Announcement We are excited to announce that we are making your provider's discharge notes available to you in Asthmatx. You will see these notes when they are completed and signed by the physician that discharged you from your recent hospital stay. If you have any questions or concerns about any information you see in Asthmatx, please call the Health Information Department where you were seen or reach out to your Primary Care Provider for more information about your plan of care. Introducing [facility]! Asher Quintero introduces Asthmatx patient portal. Now you can access parts of your medical record, email your doctor's office, and request medication refills online. 1. In your internet browser, go to [email or web address] 2. Click on the First Time User? Click Here link in the Sign In box. You will see the New Member Sign Up page. 3. Enter your Asthmatx Access Code exactly as it appears below. You will not need to use this code after youve completed the sign-up process. If you do not sign up before the expiration date, you must request a new code. · Asthmatx Access Code: [facility] Expires: 4/20/2018 12:07 PM 
 
4. Enter the last four digits of your Social Security Number (xxxx) and Date of Birth (mm/dd/yyyy) as indicated and click Submit. You will be taken to the next sign-up page. 5. Create a Asthmatx ID. This will be your Asthmatx login ID and cannot be changed, so think of one that is secure and easy to remember. 6. Create a Asthmatx password. You can change your password at any time. 7. Enter your Password Reset Question and Answer. This can be used at a later time if you forget your password. 8. Enter your e-mail address. You will receive e-mail notification when new information is available in [address] 9. Click Sign Up. You can now view and download portions of your medical record. 10. Click the Download Summary menu link to download a portable copy of your medical information. If you have questions, please visit the Frequently Asked Questions section of the [employer] website. Remember, [employer] is NOT to be used for urgent needs. For medical emergencies, dial 911. Now available from your iPhone and Android! Providers Seen During Your Hospitalization Provider Specialty Primary office phone Jaison Gates MD Cardiology [phone] Your Primary Care Physician (PCP) Primary Care Physician Office Phone Office Fax Domonique Diado [phone], Carla Marilou [address] You are allergic to the following No active allergies Recent Documentation Height Weight BMI Smoking Status 1.727 m 87.5 kg 29.32 kg/m2 Never Smoker Emergency Contacts Name Discharge Info Relation Home Work Mobile Michaell Record  Spouse [phone] Patient Belongings The following personal items are in your possession at time of discharge: 
  Dental Appliances: Uppers, Lowers  Visual Aid: Glasses, None   Hearing Aids/Status: Bilateral  Home Medications: None   Jewelry: None  Clothing: None    Other Valuables: None Discharge Instructions Attachments/References BIVENTRICULAR PACEMAKER: GENERAL INFO (ENGLISH) ICD (IMPLANTABLE CARDIOVERTER-DEFIBRILLATOR): POST-OP (ENGLISH) HEALTHY DIET: HEART (ENGLISH) HEART ATTACK: MEDICINE TO REDUCE RISK (ENGLISH) SMOKING: STOPPING (ENGLISH) SECONDHAND SMOKE (ENGLISH) OXYCODONE/ACETAMINOPHEN (BY MOUTH) (ENGLISH) CEPHALEXIN (BY MOUTH) (ENGLISH) Patient Handouts Learning About a Pacemaker for Heart Failure What is a pacemaker for heart failure? A pacemaker is a small device that is placed under the skin of your chest. It is powered by batteries. It has thin wires, called leads, that pass through a vein into your heart. A pacemaker for heart failure is a biventricular pacemaker (say \"denise-karon-TRICK-yuh-ler\"). Treatment that uses this type of pacemaker is called cardiac resynchronization therapy (CRT). When you have heart failure, the lower chambers of your heart may not pump at the same time. The pacemaker sends painless electrical signals to your heart. These signals make the chambers pump at the same time. This can help your heart pump blood better and help you feel better. Your pacemaker may be combined with an ICD, or implantable cardioverter-defibrillator. It can control abnormal heart rhythms. This can prevent sudden death. You may feel worried about having a pacemaker. This is common. It can help if you learn about how the pacemaker helps your heart. Talk to your doctor about your concerns. How is a pacemaker put in place? You will get medicine before the procedure. This helps you relax and helps prevent pain. The doctor makes a cut in the skin just below your collarbone. The cut may be on either side of your chest. The doctor will put the pacemaker leads through the cut. The leads go into a large blood vessel in the upper chest. Then the doctor will guide the leads through the blood vessel into different chambers of the heart. The doctor will place the pacemaker under the skin of your chest. He or she will attach the leads to the pacemaker. Then the cut will be closed with stitches. The procedure usually takes 2 to 3 hours. You may need to spend the night in the hospital. 
What can you expect when you have a pacemaker? A pacemaker can help your heart pump blood better. It may help you feel better so you can be more active. It also may help keep you out of the hospital and help you live longer. You can live a normal, active life with a pacemaker. But you need to avoid strong magnetic and electrical fields. Your doctor or the maker of your pacemaker can give you a full list of things to avoid.  But most everyday appliances are safe. Your doctor will check your pacemaker regularly to make sure it's working right. Pacemaker batteries usually last 5 to 15 years before they need to be replaced. Follow-up care is a key part of your treatment and safety. Be sure to make and go to all appointments, and call your doctor if you are having problems. It's also a good idea to know your test results and keep a list of the medicines you take. Where can you learn more? Go to http://jessie-anne.info/. Enter Y169 in the search box to learn more about \"Learning About a Pacemaker for Heart Failure. \" Current as of: September 21, 2016 Content Version: 11.4 © 2896-8492 [employer]. Care instructions adapted under license by [employer] (which disclaims liability or warranty for this information). If you have questions about a medical condition or this instruction, always ask your healthcare professional. [place] [address] any warranty or liability for your use of this information. Implantable Cardioverter-Defibrillator Placement: What to Expect at Home Your Recovery Implantable cardioverter-defibrillator (ICD) placement is surgery to put an ICD in your chest. An ICD is a small, battery-powered device that fixes life-threatening changes in your heartbeat. If the ICD detects a life-threatening rapid heart rhythm, it tries to slow the rhythm to get it back to normal. If the dangerous rhythm does not stop, the ICD sends an electric shock to the heart to restore a normal rhythm. The device then goes back to its watchful mode. Your chest may be sore where the doctor made the cut (incision) and put in the ICD. You also may have a bruise and mild swelling. These symptoms usually get better in 1 to 2 weeks. You may feel a hard ridge along the incision. This usually gets softer in the months after surgery.  You probably will be able to see and feel the outline of the ICD under your skin. You will probably be able to go back to work or your usual routine 1 to 2 weeks after surgery. Your doctor will talk to you about how often you will need to have the ICD checked. When you have an ICD, it is important to avoid electrical devices that can stop your ICD from working right. Check with your doctor about what you need to stay away from, what you need to use with care, and what is okay to use. You will need to stay away from things with strong magnetic and electrical fields such as MRI machines (unless your ICD is safe for an MRI), welding equipment, and power generators. You can use a cell phone, but keep it at least 6 inches away from your ICD. You can safely use most household and office electronics. These include kitchen appliances, electric power tools, and computers. This care sheet gives you a general idea about how long it will take for you to recover. But each person recovers at a different pace. Follow the steps below to get better as quickly as possible. How can you care for yourself at home? Activity ? · Rest when you feel tired. Getting enough sleep will help you recover. ? · Try to walk each day. Start by walking a little more than you did the day before. Bit by bit, increase the amount you walk. Walking boosts blood flow and helps prevent pneumonia and constipation. ? · For 4 to 6 weeks: ¨ Avoid activities that strain your chest or upper arm muscles. This includes pushing a  or vacuum, mopping floors, swimming, or swinging a golf club or tennis racquet. ¨ Do not raise your arm, on the side of your body where the ICD is located, above shoulder level. ¨ Avoid strenuous activities, such as bicycle riding, jogging, weight lifting, or heavy aerobic exercise. ¨ Avoid lifting anything that would make you strain.  This may include heavy grocery bags and milk containers, a heavy briefcase or backpack, cat litter or dog food bags, or a child. ? · Ask your doctor when you can drive again. ? · You will probably need to take about 1 to 2 weeks off from work. It depends on the type of work you do and how you feel. ? · Ask your doctor when it is okay for you to have sex. Diet ? · You can eat your normal diet. If your stomach is upset, try bland, low-fat foods like plain rice, broiled chicken, toast, and yogurt. ? · Drink plenty of fluids (unless your doctor tells you not to). Medicines ? · Your doctor will tell you if and when you can restart your medicines. He or she will also give you instructions about taking any new medicines. ? · If you take blood thinners, such as warfarin (Coumadin), clopidogrel (Plavix), or aspirin, be sure to talk to your doctor. He or she will tell you if and when to start taking those medicines again. Make sure that you understand exactly what your doctor wants you to do. ? · Take pain medicines exactly as directed. ¨ If the doctor gave you a prescription medicine for pain, take it as prescribed. ¨ If you are not taking a prescription pain medicine, ask your doctor if you can take an over-the-counter medicine. ¨ Do not take aspirin, ibuprofen (Advil, Motrin), naproxen (Aleve), or other nonsteroidal anti-inflammatory drugs (NSAIDs) unless your doctor says it is okay. ? · If you think your pain medicine is making you sick to your stomach: 
¨ Take your medicine after meals (unless your doctor has told you not to). ¨ Ask your doctor for a different pain medicine. ? · If your doctor prescribed antibiotics, take them as directed. Do not stop taking them just because you feel better. You need to take the full course of antibiotics. Incision care ? · Keep the area clean and dry. ? · Ask your doctor when you can shower or take a bath. ? · If you have strips of tape on the incision, leave the tape on for a week or until it falls off.  
? · Wash the area daily with warm, soapy water, and pat it dry. Don't use hydrogen peroxide or alcohol, which can slow healing. You may cover the area with a gauze bandage if it weeps or rubs against clothing. Exercise ? · Check with your doctor before you start an exercise program. Your doctor may recommend that you work with a physical therapist to learn how to exercise safely with an ICD. Other instructions ? · Carry your ICD identification card with you at all times. The card should include the ICD  and model information. ? · Wear medical alert jewelry that states you have an ICD. You can buy this at most [employer]. ? · Have your ICD checked as often as your doctor recommends. In some cases, this may be done over the phone or the Internet. Vic Nash will give you instructions about how to do this. ? · Talk with your doctor about the possibility of turning off the ICD at the end of life. You can put your wishes about the ICD in an advance directive. Follow-up care is a key part of your treatment and safety. Be sure to make and go to all appointments, and call your doctor if you are having problems. It's also a good idea to know your test results and keep a list of the medicines you take. When should you call for help? Call 911 anytime you think you may need emergency care. For example, call if: 
? · You passed out (lost consciousness). ? · You have trouble breathing. ?Call your doctor now or seek immediate medical care if: 
? · You receive a shock from your ICD. ? · You are dizzy or lightheaded, or you feel like you may faint. ? · You have pain that does not get better after you take pain medicine. ? · You hear an alarm or feel a vibration from your ICD. ? · You have loose stitches, or your incision comes open. ? · Bright red blood has soaked through the bandage over your incision. ? · You have signs of infection, such as: 
¨ Increased pain, swelling, warmth, or redness. ¨ Red streaks leading from the incision. ¨ Pus draining from the incision. ¨ A fever. ? Watch closely for changes in your health, and be sure to contact your doctor if you have any problems. Where can you learn more? Go to http://jessie-anne.info/. Enter K184 in the search box to learn more about \"Implantable Cardioverter-Defibrillator Placement: What to Expect at Home. \" Current as of: September 21, 2016 Content Version: 11.4 © 7382-4036 [employer]. Care instructions adapted under license by [employer] (which disclaims liability or warranty for this information). If you have questions about a medical condition or this instruction, always ask your healthcare professional. [place] [address] any warranty or liability for your use of this information. Heart-Healthy Diet: Care Instructions Your Care Instructions A heart-healthy diet has lots of vegetables, fruits, nuts, beans, and whole grains, and is low in salt. It limits foods that are high in saturated fat, such as meats, cheeses, and fried foods. It may be hard to change your diet, but even small changes can lower your risk of heart attack and heart disease. Follow-up care is a key part of your treatment and safety. Be sure to make and go to all appointments, and call your doctor if you are having problems. It's also a good idea to know your test results and keep a list of the medicines you take. How can you care for yourself at home? Watch your portions · Learn what a serving is. A \"serving\" and a \"portion\" are not always the same thing. Make sure that you are not eating larger portions than are recommended. For example, a serving of pasta is ½ cup.  A serving size of meat is 2 to 3 ounces. A 3-ounce serving is about the size of a deck of cards. Measure serving sizes until you are good at [place]" them. Keep in mind that restaurants often serve portions that are 2 or 3 times the size of one serving. · To keep your energy level up and keep you from feeling hungry, eat often but in smaller portions. · Eat only the number of calories you need to stay at a healthy weight. If you need to lose weight, eat fewer calories than your body burns (through exercise and other physical activity). Eat more fruits and vegetables · Eat a variety of fruit and vegetables every day. Dark green, deep orange, red, or yellow fruits and vegetables are especially good for you. Examples include spinach, carrots, peaches, and berries. · Keep carrots, celery, and other veggies handy for snacks. Buy fruit that is in season and store it where you can see it so that you will be tempted to eat it. · Cook dishes that have a lot of veggies in them, such as stir-fries and soups. Limit saturated and trans fat · Read food labels, and try to avoid saturated and trans fats. They increase your risk of heart disease. Trans fat is found in many processed foods such as cookies and crackers. · Use olive or canola oil when you cook. Try cholesterol-lowering spreads, such as Benecol or Take Control. · Bake, broil, grill, or steam foods instead of frying them. · Choose lean meats instead of high-fat meats such as hot dogs and sausages. Cut off all visible fat when you prepare meat. · Eat fish, skinless poultry, and meat alternatives such as soy products instead of high-fat meats. Soy products, such as tofu, may be especially good for your heart. · Choose low-fat or fat-free milk and dairy products. Eat fish · Eat at least two servings of fish a week. Certain fish, such as salmon and tuna, contain omega-3 fatty acids, which may help reduce your risk of heart attack. Eat foods high in fiber · Eat a variety of grain products every day. Include whole-grain foods that have lots of fiber and nutrients. Examples of whole-grain foods include oats, whole wheat bread, and brown rice. · Buy whole-grain breads and cereals, instead of white bread or pastries. Limit salt and sodium · Limit how much salt and sodium you eat to help lower your blood pressure. · Taste food before you salt it. Add only a little salt when you think you need it. With time, your taste buds will adjust to less salt. · Eat fewer snack items, fast foods, and other high-salt, processed foods. Check food labels for the amount of sodium in packaged foods. · Choose low-sodium versions of canned goods (such as soups, vegetables, and beans). Limit sugar · Limit drinks and foods with added sugar. These include candy, desserts, and soda pop. Limit alcohol · Limit alcohol to no more than 2 drinks a day for men and 1 drink a day for women. Too much alcohol can cause health problems. When should you call for help? Watch closely for changes in your health, and be sure to contact your doctor if: 
? · You would like help planning heart-healthy meals. Where can you learn more? Go to http://jessie-anne.info/. Enter V137 in the search box to learn more about \"Heart-Healthy Diet: Care Instructions. \" Current as of: September 21, 2016 Content Version: 11.4 © 1434-7264 [employer]. Care instructions adapted under license by [employer] (which disclaims liability or warranty for this information). If you have questions about a medical condition or this instruction, always ask your healthcare professional. [place] [address] any warranty or liability for your use of this information. Reducing Heart Attack Risk With Daily Medicine: Care Instructions Your Care Instructions Heart disease is the number one cause of death.  If you are at risk for heart disease, there are many medicines that can reduce your risk. These include: · ACE inhibitors. These are a type of blood pressure medicine. They can reduce the risk of heart attacks and strokes if you are at high risk. · Statin medicines. These lower cholesterol. They can also reduce the risk of heart disease and strokes. · Aspirin. It can help certain people lower their risk of a heart attack or stroke. · Beta-blocker medicines. These are a type of blood pressure and heart medicine. They can reduce the chance of early death if you have had a heart attack. All medicines can cause side effects. So it is important to understand the pros and cons of any medicine you take. It is also important to take your medicines exactly as your doctor tells you to. Follow-up care is a key part of your treatment and safety. Be sure to make and go to all appointments, and call your doctor if you are having problems. It's also a good idea to know your test results and keep a list of the medicines you take. ACE inhibitors ACE (angiotensin-converting enzyme) inhibitors are used for three main reasons. They lower blood pressure, protect the kidneys, and prevent heart attacks and strokes. Examples include benazepril (Lotensin), lisinopril (Prinivil, Zestril), and ramipril (Altace). Before you start taking an ACE inhibitor, make sure your doctor knows if: 
· You are taking a water pill (diuretic). · You are taking potassium pills or using salt substitutes. · You are pregnant or breastfeeding. · You have had a kidney transplant or other kidney problems. ACE inhibitors can cause side effects. Call your doctor right away if you have: · Trouble breathing. · Swelling in your face, head, neck, or tongue. · Dizziness or lightheadedness. · A dry cough. Statins Statins lower cholesterol. Examples include atorvastatin (Lipitor), lovastatin (Mevacor), pravastatin (Pravachol), and simvastatin (Zocor). Before you start taking a statin, make sure your doctor knows if: 
· You have had a kidney transplant or other kidney problems. · You have liver disease. · You take any other prescription medicine, over-the-counter medicine, vitamins, supplements, or herbal remedies. · You are pregnant or breastfeeding. Statins can cause side effects. Call your doctor right away if you have: · New, severe muscle aches. · Brown urine. Aspirin Taking an aspirin every day can lower your risk for a heart attack. A heart attack occurs when a blood vessel in the heart gets blocked. When this happens, oxygen can't get to the heart muscle, and part of the heart dies. Aspirin can help prevent blood clots that can block the blood vessels. Talk to your doctor before you start taking aspirin every day. He or she may recommend that you take one low-dose aspirin (81 mg) tablet each day, with a meal and a full glass of water. Taking aspirin isn't right for everyone. This is because it can cause serious bleeding. And you may not be able to use aspirin if you: 
· Have asthma. · Have an ulcer or other stomach problem. · Take some other medicine (called a blood thinner) that prevents blood clots. · Are allergic to aspirin. Before having a surgery or procedure, tell your doctor or dentist that you take aspirin. He or she will tell you if you should stop taking aspirin beforehand. Make sure that you understand exactly what your doctor wants you to do. Aspirin can cause side effects. Call your doctor right away if you have: · Unusual bleeding or bruising. · Nausea, vomiting, or heartburn. · Black or bloody stools. Beta-blockers Beta-blockers are used for three main reasons. They lower blood pressure, relieve angina symptoms (such as chest pain or pressure), and reduce the chances of a second heart attack. They include atenolol (Tenormin), carvedilol (Coreg), and metoprolol (Lopressor). Before you start taking a beta-blocker, make sure your doctor knows if you have: · Severe asthma or frequent asthma attacks. · A very slow pulse (less than 55 beats a minute). Beta-blockers can cause side effects. Call your doctor right away if you have: · Wheezing or trouble breathing. · Dizziness or lightheadedness. · Asthma that gets worse. When should you call for help? Watch closely for changes in your health, and be sure to contact your doctor if you have any problems. Where can you learn more? Go to http://jessie-anne.info/. Enter R428 in the search box to learn more about \"Reducing Heart Attack Risk With Daily Medicine: Care Instructions. \" Current as of: September 21, 2016 Content Version: 11.4 © 6688-6338 [employer]. Care instructions adapted under license by [employer] (which disclaims liability or warranty for this information). If you have questions about a medical condition or this instruction, always ask your healthcare professional. [place] [address] any warranty or liability for your use of this information. Stopping Smoking: Care Instructions Your Care Instructions Cigarette smokers crave the nicotine in cigarettes. Giving it up is much harder than simply changing a habit. Your body has to stop craving the nicotine. It is hard to quit, but you can do it. There are many tools that people use to quit smoking. You may find that combining tools works best for you. There are several steps to quitting. First you get ready to quit. Then you get support to help you. After that, you learn new skills and behaviors to become a nonsmoker. For many people, a necessary step is getting and using medicine. Your doctor will help you set up the plan that best meets your needs. You may want to attend a smoking cessation program to help you quit smoking. When you choose a program, look for one that has proven success. Ask your doctor for ideas. You will greatly increase your chances of success if you take medicine as well as get counseling or join a cessation program. 
Some of the changes you feel when you first quit tobacco are uncomfortable. Your body will miss the nicotine at first, and you may feel short-tempered and grumpy. You may have trouble sleeping or concentrating. Medicine can help you deal with these symptoms. You may struggle with changing your smoking habits and rituals. The last step is the tricky one: Be prepared for the smoking urge to continue for a time. This is a lot to deal with, but keep at it. You will feel better. Follow-up care is a key part of your treatment and safety. Be sure to make and go to all appointments, and call your doctor if you are having problems. It's also a good idea to know your test results and keep a list of the medicines you take. How can you care for yourself at home? · Ask your family, friends, and coworkers for support. You have a better chance of quitting if you have help and support. · Join a support group, such as Nicotine Anonymous, for people who are trying to quit smoking. · Consider signing up for a smoking cessation program, such as the American [employer] Association's Freedom from Smoking program. 
· Set a quit date. Pick your date carefully so that it is not right in the middle of a big deadline or stressful time. Once you quit, do not even take a puff. Get rid of all ashtrays and lighters after your last cigarette. Clean your house and your clothes so that they do not smell of smoke. · Learn how to be a nonsmoker. Think about ways you can avoid those things that make you reach for a cigarette. ¨ Avoid situations that put you at greatest risk for smoking. For some people, it is hard to have a drink with friends without smoking. For others, they might skip a coffee break with coworkers who smoke. ¨ Change your daily routine. Take a different route to work or eat a meal in a different place. · Cut down on stress. Calm yourself or release tension by doing an activity you enjoy, such as reading a book, taking a hot bath, or gardening. · Talk to your doctor or pharmacist about nicotine replacement therapy, which replaces the nicotine in your body. You still get nicotine but you do not use tobacco. Nicotine replacement products help you slowly reduce the amount of nicotine you need. These products come in several forms, many of them available over-the-counter: ¨ Nicotine patches ¨ Nicotine gum and lozenges ¨ Nicotine inhaler · Ask your doctor about bupropion (Wellbutrin) or varenicline (Chantix), which are prescription medicines. They do not contain nicotine. They help you by reducing withdrawal symptoms, such as stress and anxiety. · Some people find hypnosis, acupuncture, and massage helpful for ending the smoking habit. · Eat a healthy diet and get regular exercise. Having healthy habits will help your body move past its craving for nicotine. · Be prepared to keep trying. Most people are not successful the first few times they try to quit. Do not get mad at yourself if you smoke again. Make a list of things you learned and think about when you want to try again, such as next week, next month, or next year. Where can you learn more? Go to http://jessie-anne.info/. Enter D424 in the search box to learn more about \"Stopping Smoking: Care Instructions. \" Current as of: March 20, 2017 Content Version: 11.4 © 7836-9670 [facility]. Care instructions adapted under license by [employer] (which disclaims liability or warranty for this information). If you have questions about a medical condition or this instruction, always ask your healthcare professional. [address] any warranty or liability for your use of this information. Secondhand Smoke: Care Instructions Your Care Instructions Secondhand smoke comes from the burning end of a cigarette, cigar, or pipe and the smoke that a smoker exhales. The smoke contains nicotine and many other harmful chemicals. Breathing secondhand smoke can cause or worsen health problems including cancer, asthma, coronary artery disease, and respiratory infections. It can make your eyes and nose burn and cause a sore throat. Secondhand smoke is especially bad for babies and young children whose lungs are still developing. Babies whose parents smoke are more likely to have ear infections, pneumonia, and bronchitis in the first few years of their lives. Secondhand smoke can make asthma symptoms worse in children. If you are pregnant, it is important that you not smoke and that you avoid secondhand smoke. You are more likely to give birth to a baby who weighs less than expected (low birth weight) if you smoke. And your baby may have a greater risk for sudden infant death syndrome (SIDS). Babies whose mothers are exposed to secondhand smoke during pregnancy have a higher risk for health problems. Follow-up care is a key part of your treatment and safety. Be sure to make and go to all appointments, and call your doctor if you are having problems. It's also a good idea to know your test results and keep a list of the medicines you take. How can you care for yourself at home? · Do not smoke or let anyone else smoke in your home. If people must smoke, ask them to go outside. · If people do smoke in your home, choose a room where you can open a window or use a fan to get the smoke outside. · Do not let anyone smoke in your car. If someone must smoke, pull over in a safe place and let him or her smoke away from the car. · Ask your employer to make sure that you have a smoke-free work area. · Make sure that your children are not exposed to secondhand smoke at day care, school, and after-school programs. · Try to choose nonsmoking bars, restaurants, and other public places when you go out. · Help your family and friends who smoke to quit by encouraging them to try. Tell them about treatment resources. Having support from others often helps. · If you smoke, quit. Quitting is hard, but there are ways to boost your chance of quitting tobacco for good. ¨ Use nicotine gum, patches, or lozenges. Call a quitline. Ask your doctor about stop-smoking programs and medicines. ¨ Keep trying. When should you call for help? Watch closely for changes in your health, and be sure to contact your doctor if you have any problems. Where can you learn more? Go to http://jessieOrganics Rxanne.info/. Enter L004 in the search box to learn more about \"Secondhand Smoke: Care Instructions. \" Current as of: March 20, 2017 Content Version: 11.4 © 9598-9272 [employer]. Care instructions adapted under license by [employer] (which disclaims liability or warranty for this information). If you have questions about a medical condition or this instruction, always ask your healthcare professional. [address] any warranty or liability for your use of this information. Oxycodone/Acetaminophen (By mouth) Acetaminophen (u-brrt-f-MIN-oh-fen), Oxycodone Hydrochloride (wd-l-CGS-done lanette-droe-KLOR-nilda) Treats moderate to moderately severe pain. This medicine is a narcotic pain reliever. Brand Name(s): Endocet, Percocet, Primlev, Xartemis XR There may be other brand names for this medicine. When This Medicine Should Not Be Used: This medicine is not right for everyone. Do not use it if you had an allergic reaction to acetaminophen or oxycodone, or if you have serious breathing problems or paralytic ileus. How to Use This Medicine:  
Capsule, Liquid, Tablet, Long Acting Tablet · Your doctor will tell you how much medicine to use. Do not use more than directed. · An overdose can be dangerous. Follow directions carefully so you do not get too much medicine at one time. · Oral liquid: Measure the oral liquid medicine with a marked measuring spoon, oral syringe, or medicine cup. · Swallow the extended-release tablet whole. Do not crush, break, or chew it. Do not lick or wet the tablet before placing it in your mouth. Do not give this medicine through a feeding tube. · This medicine should come with a Medication Guide. Ask your pharmacist for a copy if you do not have one. · Missed dose: If you miss a dose of this medicine, skip the missed dose and go back to your regular dosing schedule. Do not double doses. · Store the medicine in a closed container at room temperature, away from heat, moisture, and direct light. Ask your pharmacist about the best way to dispose of medicine you do not use. Drugs and Foods to Avoid: Ask your doctor or pharmacist before using any other medicine, including over-the-counter medicines, vitamins, and herbal products. · Do not use Xartemis XR if you are using or have used an MAO inhibitor in the past 14 days. · Some medicines can affect how this medicine works. Tell your doctor if you are using any of the following: ¨ Carbamazepine, erythromycin, ketoconazole, lamotrigine, mirtazapine, naltrexone, phenytoin, propranolol, rifampin, ritonavir, tramadol, trazodone, or zidovudine ¨ Birth control pills ¨ Diuretic (water pill) ¨ Medicine to treat depression ¨ Phenothiazine medicine ¨ Triptan medicine to treat migraine headaches · Do not drink alcohol while you are using this medicine. Acetaminophen can damage your liver, and alcohol can increase this risk. Do not take acetaminophen without asking your doctor if you have 3 or more drinks of alcohol every day. · Tell your doctor if you use anything else that makes you sleepy. Some examples are allergy medicine, narcotic pain medicine, and alcohol.  Tell your doctor if you are using buprenorphine, butorphanol, nalbuphine, pentazocine, a benzodiazepine, or a muscle relaxer. Warnings While Using This Medicine: · Tell your doctor if you are pregnant or breastfeeding, or if you have kidney disease, liver disease, heart disease, low blood pressure, breathing problems or lung disease (such as asthma, COPD), thyroid problems, Meigs disease, pancreas or gallbladder problems, prostate problems, trouble urinating, or a stomach problems, or a history of head injury or brain damage, seizures, or alcohol or drug abuse. Tell your doctor if you are allergic to codeine. · This medicine may cause the following problems: 
¨ High risk of overdose, which can lead to death ¨ Respiratory depression (serious breathing problem that can be life-threatening) ¨ Liver problems ¨ Serious skin reactions ¨ Serotonin syndrome (when used with certain medicines) · This medicine may make you dizzy or drowsy. Do not drive or do anything that could be dangerous until you know how this medicine affects you. Sit or lie down if you feel dizzy. Stand up carefully. · This medicine contains acetaminophen. Read the labels of all other medicines you are using to see if they also contain acetaminophen, or ask your doctor or pharmacist. Walter Sensing not use more than 4 grams (4,000 milligrams) total of acetaminophen in one day. · This medicine can be habit-forming. Do not use more than your prescribed dose. Call your doctor if you think your medicine is not working. · Do not stop using this medicine suddenly. Your doctor will need to slowly decrease your dose before you stop it completely. · This medicine could cause infertility. Talk with your doctor before using this medicine if you plan to have children. · This medicine may cause constipation, especially with long-term use. Ask your doctor if you should use a laxative to prevent and treat constipation. · Keep all medicine out of the reach of children. Never share your medicine with anyone. Possible Side Effects While Using This Medicine:  
Call your doctor right away if you notice any of these side effects: · Allergic reaction: Itching or hives, swelling in your face or hands, swelling or tingling in your mouth or throat, chest tightness, trouble breathing · Anxiety, restlessness, fast heartbeat, fever, muscle spasms, twitching, diarrhea, seeing or hearing things that are not there · Blistering, peeling, red skin rash · Blue lips, fingernails, or skin · Dark urine or pale stools, loss of appetite, stomach pain, yellow skin or eyes · Extreme weakness, shallow breathing, uneven heartbeat, seizures, sweating, or cold or clammy skin · Severe confusion, lightheadedness, dizziness, or fainting · Severe constipation, nausea, or vomiting · Trouble breathing or slow breathing If you notice these less serious side effects, talk with your doctor:  
· Headache · Mild constipation, nausea, or vomiting · Mild sleepiness or drowsiness If you notice other side effects that you think are caused by this medicine, tell your doctor. Call your doctor for medical advice about side effects. You may report side effects to FDA at 7-703-FDA-2068 © 2017 [address] Information is for End User's use only and may not be sold, redistributed or otherwise used for commercial purposes. The above information is an  only. It is not intended as medical advice for individual conditions or treatments. Talk to your doctor, nurse or pharmacist before following any medical regimen to see if it is safe and effective for you. Cephalexin (By mouth) Cephalexin (asi-h-JHI-in) Treats infections. This medicine is a cephalosporin antibiotic. Brand Name(s): Codey Hutchison There may be other brand names for this medicine. When This Medicine Should Not Be Used: This medicine is not right for everyone. Do not use this medicine if you had an allergic reaction to cephalexin or another cephalosporin medicine. How to Use This Medicine:  
Capsule, Tablet, Tablet for Suspension, Liquid · Your doctor will tell you how much medicine to use. Do not use more than directed. · Read and follow the patient instructions that come with this medicine. Talk to your doctor or pharmacist if you have any questions. · You may take your medicine with food or milk to avoid stomach upset. · Oral liquid: Shake well just before use. Measure the oral liquid medicine with a marked measuring spoon, oral syringe, or medicine cup. · Take all of the medicine in your prescription to clear up your infection, even if you feel better after the first few doses. · Missed dose: Take a dose as soon as you remember. If it is almost time for your next dose, wait until then and take a regular dose. Do not take extra medicine to make up for a missed dose. · Capsule or tablet: Store at room temperature away from heat, moisture, and direct light. · Oral liquid: Store in the refrigerator for 14 days. After 14 days, throw away any unused medicine. Do not freeze. Drugs and Foods to Avoid: Ask your doctor or pharmacist before using any other medicine, including over-the-counter medicines, vitamins, and herbal products. · Some medicines and foods can affect how cephalexin works. Tell your doctor if you are also using ¨ Metformin ¨ Probenecid Warnings While Using This Medicine: · Tell your doctor if you are pregnant or breastfeeding, or if you have kidney disease, liver disease, or a history of digestive problems, such as colitis. Tell your doctor if you are allergic to penicillin. · This medicine can cause diarrhea. Call your doctor if the diarrhea becomes severe, does not stop, or is bloody. Do not take any medicine to stop diarrhea until you have talked to your doctor.  Diarrhea can occur 2 months or more after you stop taking this medicine. · Tell any doctor or dentist who treats you that you are using this medicine. This medicine may affect certain medical test results. · Call your doctor if your symptoms do not improve or if they get worse. · Keep all medicine out of the reach of children. Never share your medicine with anyone. Possible Side Effects While Using This Medicine:  
Call your doctor right away if you notice any of these side effects: · Allergic reaction: Itching or hives, swelling in your face or hands, swelling or tingling in your mouth or throat, chest tightness, trouble breathing · Blistering, peeling, red skin rash · Severe diarrhea, especially if bloody or ongoing · Severe stomach pain, vomiting If you notice these less serious side effects, talk with your doctor: · Mild diarrhea or nausea If you notice other side effects that you think are caused by this medicine, tell your doctor. Call your doctor for medical advice about side effects. You may report side effects to FDA at 7-354-FDA-4595 © 2017 [address] Information is for End User's use only and may not be sold, redistributed or otherwise used for commercial purposes. The above information is an  only. It is not intended as medical advice for individual conditions or treatments. Talk to your doctor, nurse or pharmacist before following any medical regimen to see if it is safe and effective for you. Please provide this summary of care documentation to your next provider. Signatures-by signing, you are acknowledging that this After Visit Summary has been reviewed with you and you have received a copy. Patient Signature:  ____________________________________________________________ Date:  ____________________________________________________________  
  
Mackenzie Garza  Provider Signature: ____________________________________________________________ Date:  ____________________________________________________________

## 2018-01-25 NOTE — PROGRESS NOTES
Pt arrived from OR orally intubated with a #8.0 ETT and is secured at the 24 cm ivan at the lip and on the right side. Breath sounds are coarse. Trachea is midline. Chest excursion is  symmetric. Sub Q  Negative . Peripheral pulse present yes Edema  no  Capillary refill 3 skin color Warm, [facility],all alarms are on and audible, safety rales up,resus bag is at the [facility].

## 2018-01-25 NOTE — PROGRESS NOTES
MIDLINE Placement Note    PRE-PROCEDURE VERIFICATION  PROCEDURE DETAIL  Time out completed all person present in agreement with time out. A single lumen Midline was attempted for vascular access. The following documentation is in addition to the Midline properties in the lines/airways flowsheet :  Lot #: [id number]  Xylocaine used: y  Mid-Arm Circumference:  (cm)  Internal Catheter Length: 8 (cm)  Internal Catheter Total Length: 8 (cm)  Vein Selection for Midline:left basilic and left cephalic  Unsuccessful attempt in basilic and cephalic vein. Could not get wire to thread into vessel.

## 2018-01-25 NOTE — ANESTHESIA POSTPROCEDURE EVALUATION
Post-Anesthesia Evaluation and Assessment    Patient: Mata Hardin MRN: [id number]  SSN: [id number]    YOB: 1935  Age: 80 y.o. Sex: male       Cardiovascular Function/Vital Signs  Visit Vitals    /69    Pulse 84    Temp 36.2 °C (97.1 °F)    Resp 17    Ht 5' 8\" (1.727 m)    Wt 74.8 kg (165 lb)    SpO2 95%    BMI 25.09 kg/m2       Patient is status post total IV anesthesia anesthesia for Procedure(s):  BIV ICD GEN CHANGE. Nausea/Vomiting: None    Postoperative hydration reviewed and adequate. Pain:  Pain Scale 1: Numeric (0 - 10) (01/25/18 1320)  Pain Intensity 1: 0 (01/25/18 1320)   Managed    Neurological Status: At baseline    Mental Status and Level of Consciousness: Arousable    Pulmonary Status:   Nasal cannula  Adequate oxygenation and airway patent    Complications related to anesthesia: ACLS required     Post-anesthesia assessment completed. No concerns. Pt doing very well. Extubated and talking with staff. No ECHO changes. Doing well. Denies complaints.      Signed By: Candy Silva MD     January 25, 2018

## 2018-01-25 NOTE — PROGRESS NOTES
Dr. Henrietta Ba updated via phone - patient remains alert and oriented, urine output marginal - continue monitoring.

## 2018-01-25 NOTE — ANESTHESIA PREPROCEDURE EVALUATION
Anesthetic History   No history of anesthetic complications            Review of Systems / Medical History  Patient summary reviewed and pertinent labs reviewed    Pulmonary  Within defined limits                 Neuro/Psych   Within defined limits           Cardiovascular    Hypertension: well controlled      CHF: NYHA Classification II  Dysrhythmias (Paroxysmal V Tach)   Pacemaker (ICD) and CAD (Severe without intervention)    Exercise tolerance: >4 METS  Comments: EF 24% 2015  SEVERE CAD   GI/Hepatic/Renal  Within defined limits              Endo/Other    Diabetes: well controlled, type 2         Other Findings            Physical Exam    Airway  Mallampati: II  TM Distance: 4 - 6 cm  Neck ROM: normal range of motion   Mouth opening: Normal     Cardiovascular    Rhythm: regular  Rate: normal         Dental    Dentition: Edentulous     Pulmonary  Breath sounds clear to auscultation               Abdominal  GI exam deferred       Other Findings            Anesthetic Plan    ASA: 4  Anesthesia type: total IV anesthesia          Induction: Intravenous  Anesthetic plan and risks discussed with: Patient and Spouse

## 2018-01-26 NOTE — PROGRESS NOTES
UOP remains inadequate with bump in creatinine, see results. MAP dropping to mid-50s from mid-60s previous shift. Maria Eugenia Milan NP, notified. Orders received for 500 mL 0.9%NS bolus over 1 hr, then to notify her upon completion.

## 2018-01-26 NOTE — PROGRESS NOTES
Nutrition  Referral received from Malnutrition Screening Tool for recently lost weight  ( 14-23 pounds) without trying. Eating poorly d/t decreased appetite [No]  Weight hx per EMR ( Based on connect care functionality, RD cannot know if these weight are actual versus stated):   WT / BMI WEIGHT BODY MASS INDEX   1/26/2018 172 lb 2.9 oz    1/19/2018 165 lb 6.4 oz 26.7 kg/m2   7/19/2017 182 lb 29.38 kg/m2   1/18/2017 184 lb 29.7 kg/m2   8/16/2016 184 lb 12.8 oz 29.83 kg/m2   7/19/2016 187 lb 28.44 kg/m2   9/16/2015 177 lb 8 oz 26.99 kg/m2   8/7/2015 170 lb 25.85 kg/m2    Since no change in appetite was identified, weight variances could be r/t to fluid given hx of CHF with EF 20-25%  Therefore, F/U per nutrition standard of care.   Everette Schirmer, [address], [address] [place] [address]

## 2018-01-26 NOTE — PROGRESS NOTES
Spoke with Jewels Damon, MANJINDER about patient having low UOP, informed that patient has a chronically low BP, his post  procedure cr was 1.95, and he has a low EF of 20-25%. Patient has no IVF running at this time and has minimal po intake. Orders received for 40mg IVP Lasix x1. Will continue to monitor for changes.

## 2018-01-26 NOTE — PROGRESS NOTES
[facility] CARDIOLOGY PROGRESS NOTE           1/26/2018 9:20 AM    Admit Date: 1/25/2018      Subjective:   Called earlier this am with decreased UOP and hypotension. Ordered 500 ml bolus and now here to see patient for Dr. Kari Lindo. Had cardiac arrest during generator change yesterday. Repeat echo showed -  Left ventricle: Systolic function was markedly reduced. Ejection fraction was estimated in the range of 20 % to 25 %. There was akinesis of the mid-apical anterior, apical lateral, and apical wall(s). Received lasix yesterday for crackles and decreased UOP with no response except Cr worse this am. Cr 2.46 and 1.92 day of admission with prior baseline ~1.2-1.3. Reviewed office notes with mild hypotension over last year. Currently sitting up in chair, he denies any chest pain or SOB just feels tired. B/P 90/40. ICU monitor showing AV pacing. ROS:  Cardiovascular:  As noted above    Objective:      Vitals:    01/26/18 0732 01/26/18 0745 01/26/18 0800 01/26/18 0802   BP: 92/55   (!) 83/44   Pulse:  79 79    Resp:  17 16    Temp:       SpO2:  94% 94%    Weight:       Height:           Physical Exam:  General-No Acute Distress, no respiratory distress  Neck- supple, no JVD  CV- regular rate and rhythm no MRG  Lung- few fine crackles in bases  Abd- soft, nontender, nondistended  Ext- no edema bilaterally. Skin- warm and dry    Data Review:   Recent Labs      01/26/18   0314  01/25/18   1527  01/25/18   0835   NA  142  142  141   K  3.9  3.8  4.2   MG  2.0   --   2.3   BUN  41*  36*  33*   CREA  2.46*  1.95*  1.92*   GLU  162*  173*  92   WBC  11.6*   --   7.6   HGB  9.7*   --   11.9*   HCT  29.8*   --   36.4*   PLT  114*   --   131*   INR   --    --   1.2       Assessment/Plan:     Active Problems:    Ventricular fibrillation ([address] (1/25/2018)- with h/o VT as well; s/p cardiac arrest yesterday during generator change. Felt to be secondary to end stage CM.  Received CPR and stabilized on epi gtt (now off gtt). Continue amiodarone. Cardiac arrest ([facility] (1/25/2018)    See above    CAD- no active angina sx; not candidate for revascularization, medical therapy only. On Asa and statin. Chronic systolic heart failure- s/p successful generator change of a [employer] biventricular implantable cardioverter-defibrillator. Off home meds- BB with hypotension and no ACE-I d/t hypotension and CHANTE/CKD. CHANTE/CKD- worse post IV lasix; giving IVF bolus 500ml and will recheck labs in am.     Hypotension- mild; which is his baseline outpatient as well. Holding home meds BB and ACE-I as stated above. Emily Hernandez NP  1/26/2018 9:20 AM    Attending Addendum    Patient independently seen and examined by me. Agree with above note by physician extender with the following additions and exceptions: no complaints this afternoon    Key findings are:  No CP or PENG  CV- RRR without murmur  Lungs- minimal bibasilar crackles  Ext- no edema    Plan: holding home OMT 2/2 hypotension       --gentle hydration in setting of hypotension and ARF       --if renal function does not improve in next 24 hours, will likely require nephrology consultation       --otherwise, plan as noted above    Frankey Chant B. [address] Cardiology

## 2018-01-26 NOTE — PROGRESS NOTES
MAP remains 55-70, UOP 10-20 mL/hr. Dr. Curt Velez at bedside for eval. States current BP trends acceptable. Orders to keep Petersen in place in order to monitor I&Os and maintain IVF at 50 mL/hr.

## 2018-01-26 NOTE — PROGRESS NOTES
Spiritual Care visit. Initial visit.  visited, conversed with patient at bedside. Affirmed patient's eliana, and prayed for healing.     Visit by Manisha Hair M.Ed., Th.B. ,Staff

## 2018-01-27 NOTE — PROGRESS NOTES
Bedside and Verbal shift change report given to Perez Pascal RN (oncoming nurse) by Laurel June RN (offgoing nurse).

## 2018-01-27 NOTE — PROGRESS NOTES
Room air sat 87% pt coughing up slight blood tinged sputum. Pt was resuscitated and has sore throat. He has used cough drops which has relieved discomfort to throat.

## 2018-01-27 NOTE — PROGRESS NOTES
1/27/2018 9:34 AM    Admit Date: 1/25/2018    Admit Diagnosis: Cardiac pacemaker battery worn out [Z45.010]      Subjective:   Cp from compressions- no sob      Objective:      Visit Vitals    BP 98/55    Pulse 80    Temp 98.6 °F (37 °C)    Resp 24    Ht 5' 8\" (1.727 m)    Wt 78.6 kg (173 lb 3.2 oz)    SpO2 92%    BMI 26.33 kg/m2       Physical Exam:  Nelwyn Docker, Well Nourished, No Acute Distress, Alert & Oriented x 3, appropriate mood. Neck- supple, no JVD  CV- regular rate and rhythm no MRG  Lung- clear bilaterally  Abd- soft, nontender, nondistended  Ext- no edema bilaterally. Skin- warm and dry        Data Review:   Recent Labs      01/27/18   0447  01/26/18   0314   01/25/18   0835   NA  140  142   < >  141   K  3.8  3.9   < >  4.2   BUN  51*  41*   < >  33*   CREA  2.90*  2.46*   < >  1.92*   WBC   --   11.6*   --   7.6   HGB   --   9.7*   --   11.9*   HCT   --   29.8*   --   36.4*   PLT   --   114*   --   131*   INR   --    --    --   1.2    < > = values in this interval not displayed. Assessment/Plan:     Active Problems:    Ventricular fibrillation (HCC) (1/25/2018)Stable. Continue current medical therapy. ARF - worse - will ask renal to see- ? Consider low dose dopamine for renal perfusion with intermittent hypotension- will see renal opinion  Chronic systolic chf- Improved with current therapy. Will continue medications  Holding omt with arf and hypotension    Cardiac arrest ([facility] (1/25/2018)Improved with current therapy.  Will continue medications  Rhythm stable

## 2018-01-27 NOTE — PROGRESS NOTES
TRANSFER - IN REPORT:    Verbal report received from Noe Priteo RN(name) on Marquis Bullard  being received from CVICU(unit) for routine progression of care      Report consisted of patients Situation, Background, Assessment and   Recommendations(SBAR). Information from the following report(s) SBAR, Kardex, Recent Results and Cardiac Rhythm Paced was reviewed with the receiving nurse. Opportunity for questions and clarification was provided. Assessment completed upon patients arrival to unit and care assumed. Skin assessment done with Second RN. Skin tear on left elbow. Sacrum red but blanchable.

## 2018-01-27 NOTE — PROGRESS NOTES
Dr. Vivian Barkley at bedside updated to urine output 175ml in last 12hours , cough with non productive sputum. MD stated consulting Nephrology.

## 2018-01-27 NOTE — CONSULTS
[facility] Consultation    Admission Date:  1/25/2018    Admission Diagnosis:  Cardiac pacemaker battery worn out [Z45.010]    History of Present Illness:  Pt is a 79 yo WM who presented to have AICD/pacer generator replaced after being own. During procedure, had cardiac arrest s/p resuscitation. Has progressive CKD but has not seen a nephrologist before. Cr about 1.4 in 2015, about 1.6 in Jul and Oct '17 at [facility] and 2.0 in 11/17 again at [facility]. Admission Cr here about 1.9 and post-arrest, Cr has increased to 2.9 today. Currently, SBP in the 90's but he reports SBP frequently 80-90's as an outpatient. Getting Toradol for pain here.     Past Medical History:   Diagnosis Date    Arrhythmia 2004    VT    Atherosclerosis of native arteries of the extremities with intermittent claudication 9/16/2015    Automatic implantable cardiac defibrillator in situ 9/16/2015    CAD (coronary artery disease)     inoperable, medical management only    Chronic systolic heart failure ([address] 9/16/2015    Common variable immunodeficiency ([address] 9/16/2015    Congestive heart failure, unspecified 9/16/2015    Diabetes ([address] x 15 yrs    type 2- sqbs am avg ---hypo at 52's    Diseases of tricuspid valve 9/16/2015    Essential hypertension, benign 9/16/2015    Heart failure (HCC)     EF 25-30%    Hypertension x 1996    controlled with meds    Occlusion and stenosis of carotid artery without mention of cerebral infarction 9/16/2015    Other chronic pulmonary heart diseases 9/16/2015    Other primary cardiomyopathies 9/16/2015    PAD (peripheral artery disease) (HCC)     carotid dz    Pain in limb 9/16/2015    Paroxysmal ventricular tachycardia ([address] 9/16/2015    Peripheral vascular disease, unspecified 9/16/2015    Type II or unspecified type diabetes mellitus without mention of complication, not stated as uncontrolled 9/16/2015      Past Surgical History:   Procedure Laterality Date    HX HERNIA REPAIR      HX PACEMAKER      defib placements x 2      Current Facility-Administered Medications   Medication Dose Route Frequency    0.9% sodium chloride infusion  50 mL/hr IntraVENous CONTINUOUS    vancomycin (VANCOCIN) 1250 mg in  ml infusion  1,250 mg IntraVENous Q48H    amiodarone (CORDARONE) tablet 200 mg  200 mg Oral DAILY    aspirin chewable tablet 81 mg  81 mg Oral 7am    atorvastatin (LIPITOR) tablet 40 mg  40 mg Oral QHS    levothyroxine (SYNTHROID) tablet 25 mcg  25 mcg Oral ACB    sodium chloride (NS) flush 5-10 mL  5-10 mL IntraVENous Q8H    sodium chloride (NS) flush 5-10 mL  5-10 mL IntraVENous PRN    oxyCODONE-acetaminophen (PERCOCET) 5-325 mg per tablet 1 Tab  1 Tab Oral Q4H PRN    ketorolac (TORADOL) injection 15 mg  15 mg IntraVENous Q6H     No Known Allergies   Social History   Substance Use Topics    Smoking status: Never Smoker    Smokeless tobacco: Never Used    Alcohol use No      Family History   Problem Relation Age of Onset    Diabetes Sister         Review of Systems:  Gen - no fever, appetite unchanged  CV - no chest pain, no palpitation  Lung - no shortness of breath, no cough  Abd - no tenderness, no nausea/vomiting, no diarrhea  Ext - no edema  Musculoskeletal - no joint pain  Neurologic - no headaches, no dizziness  Skin - no rashes, no purpura  Genitourinary - no change in urine output    Objective:  Vitals:    01/27/18 0507 01/27/18 0725 01/27/18 0735 01/27/18 1204   BP:  98/55  97/52   Pulse:  80  80   Resp:  24  22   Temp:  98.6 °F (37 °C)  98.4 °F (36.9 °C)   SpO2:  (!) 85% 92% 98%   Weight: 78.6 kg (173 lb 3.2 oz)      Height:           Intake/Output Summary (Last 24 hours) at 01/27/18 1323  Last data filed at 01/27/18 1205   Gross per 24 hour   Intake          1721.67 ml   Output              244 ml   Net          1477.67 ml     Wt Readings from Last 3 Encounters:   01/27/18 78.6 kg (173 lb 3.2 oz)   01/19/18 75 kg (165 lb 6.4 oz)   07/19/17 82.6 kg (182 lb) GEN - in no distress, alert and oriented  Neck - no JVD  CV - regular, no murmur, no rub  Lung -  Rales mostly at right base, lungs expand symmetrically  Chest wall - normal appearance  Abd - soft, nontender, bowel sounds present  Ext - no edema  Neurologic - nonfocal  Genitourinary - bladder nonpalpable  Skin - no rashes, no purpura      Data Review:     Recent Labs      01/26/18   0314  01/25/18   0835   WBC  11.6*  7.6   HGB  9.7*  11.9*   HCT  29.8*  36.4*   PLT  114*  131*   INR   --   1.2        Recent Labs      01/27/18   0447  01/26/18   0314  01/25/18   1527  01/25/18   0835   NA  140  142  142  141   K  3.8  3.9  3.8  4.2   CL  105  105  105  104   CO2  26  27  27  25   BUN  51*  41*  36*  33*   CREA  2.90*  2.46*  1.95*  1.92*   CA  8.4  8.1*  8.3  9.0   GLU  145*  162*  173*  92   MG   --   2.0   --   2.3         Assessment:     Active Problems:    Ventricular fibrillation ([facility] (1/25/2018)      Cardiac arrest ([facility] [address] (1/25/2018)        Plan:     1. CHANTE/CKD  - Progressive CKD as an outpatient  - Cr about 1.4 in 2015, 1.6 in Jul and Oct' 17 and 2.0 in 11/17  - Admission Cr 1.9, now 2.9 post-arrest, however, has also been getting toradol which needs to be d/c'd asap  - I wonder whether chronic hypotension contributed to progressive increase in Cr as an outpatient as he reports SBP frequently in the 80-90's (but on multiple BP meds, BB, ACE-I and diuretic as an outpatient)  - Will check UA, FeNa, renal US       - Getting IVF, can consider adding low dose dopamine if no improvement by tomorrow  2. S/p cardiac arrest  3.  S/p AICD/pacer generator replacement

## 2018-01-28 NOTE — PROGRESS NOTES
Pt coughed and small amount of vomit came up as well. Pt denies nausea. Pt given green bag and tissues in case another incident occurs. Meplex dressing removed from L elbow. Elbow cleaned with warm soapy gauze area dried with 4x4, skin prep applied to surrounding intact skin of skin tear. Skin tear appears to be the size of a nickel, healing well. Dried bloody drainage noted on old dressing. New meplex dressing applied.

## 2018-01-28 NOTE — PROGRESS NOTES
1/28/2018 10:25 AM    Admit Date: 1/25/2018    Admit Diagnosis: Cardiac pacemaker battery worn out [Z45.010]      Subjective:   NO cp or sob or dizziness      Objective:      Visit Vitals    BP (!) 84/46    Pulse 80    Temp 97 °F (36.1 °C)    Resp 22    Ht 5' 8\" (1.727 m)    Wt 79.4 kg (175 lb)    SpO2 91%    BMI 26.61 kg/m2       Physical Exam:  Bhakti Mitten, Well Nourished, No Acute Distress, Alert & Oriented x 3, appropriate mood. Neck- supple, no JVD  CV- regular rate and rhythm no MRG  Lung- clear bilaterally  Abd- soft, nontender, nondistended  Ext- no edema bilaterally. Skin- warm and dry        Data Review:   Recent Labs      01/28/18   0428   01/26/18   0314   NA  137   < >  142   K  3.9   < >  3.9   BUN  53*   < >  41*   CREA  2.89*   < >  2.46*   WBC   --    --   11.6*   HGB   --    --   9.7*   HCT   --    --   29.8*   PLT   --    --   114*    < > = values in this interval not displayed. Assessment/Plan:     Active Problems:    Ventricular fibrillation (HCC) (1/25/2018)Stable. Continue current medical therapy. Cardiac arrest ([facility] [address] (1/25/2018)Improved with current therapy. Will continue medications      Chronic systolic hf- Improved with current therapy.  no ace/arb or beta blocker due to hypotension  Acute on crf- better-monitor another day- appreciated renal input

## 2018-01-28 NOTE — PROGRESS NOTES
Bedside report received from [facility]. Opportunity for questions, concerns. Patient involved.

## 2018-01-28 NOTE — PROGRESS NOTES
Pt assisted back to bed. IS used pt not wanting to cough. Pain level 5/10 with coughing. Pain medication given and positioned for comfort in bed.

## 2018-01-28 NOTE — PROGRESS NOTES
Bedside report given to [facility]. Opportunity for questions, concerns. Patient involved.

## 2018-01-28 NOTE — PROGRESS NOTES
Dr. Vivian Barkley at bedside updated to [address] [facility] last night. BP 61'F systolic. O2 sat 84-88% on RA. 3 L NC increased 91%. IV NS D/C. MD plans to get chest xray.

## 2018-01-28 NOTE — PROGRESS NOTES
[facility] Progress Note    Follow-Up on: CHANTE    ROS:  Just tired, also mild SOB    Exam:  Vitals:    01/27/18 2230 01/28/18 0337 01/28/18 0400 01/28/18 0730   BP: (!) 87/48 (!) 84/49  (!) 84/46   Pulse: 80 78  80   Resp: 22 18 22   Temp: 97.8 °F (36.6 °C) 98.8 °F (37.1 °C)  97 °F (36.1 °C)   SpO2: 97% 94%  91%   Weight:   79.4 kg (175 lb)    Height:             Intake/Output Summary (Last 24 hours) at 01/28/18 0949  Last data filed at 01/28/18 0750   Gross per 24 hour   Intake             2190 ml   Output              375 ml   Net             1815 ml       Wt Readings from Last 3 Encounters:   01/28/18 79.4 kg (175 lb)   01/19/18 75 kg (165 lb 6.4 oz)   07/19/17 82.6 kg (182 lb)       GEN - in no distress  CV - regular, no murmur, no rub  Lung - clear bilaterally  Abd - soft, nontender  Ext - no edema    Recent Labs      01/26/18   0314   WBC  11.6*   HGB  9.7*   HCT  29.8*   PLT  114*        Recent Labs      01/28/18   0428  01/27/18   1527  01/27/18   0447  01/26/18   0314   NA  137  141  140  142   K  3.9  3.8  3.8  3.9   CL  106  106  105  105   CO2  22  26 26  27   BUN  53*  53*  51*  41*   CREA  2.89*  3.03*  2.90*  2.46*   CA  8.2*  8.0*  8.4  8.1*   GLU  125*  140*  145*  162*   MG   --    --    --   2.0       Assessment / Plan: Active Problems:    Ventricular fibrillation ([facility] (1/25/2018)      Cardiac arrest ([facility] [address] (1/25/2018)      1. CHANTE/CKD  - Progressive CKD as an outpatient  - Cr about 1.4 in 2015, 1.6 in Jul and Oct' 17 and 2.0 in 11/17  - Admission Cr 1.9, 2.9 post-arrest, was also getting Toradol  - I wonder whether chronic hypotension contributed to progressive increase in Cr as an outpatient as he reports SBP frequently in the 80-90's (but on multiple BP meds, BB, ACE-I and diuretic as an outpatient)  - Cr slightly better, IVF stopped today due to mild SOB, continue to monitor lab  2. S/p cardiac arrest  3.  S/p AICD/pacer generator replacement

## 2018-01-29 PROBLEM — N18.30 ACUTE RENAL FAILURE SUPERIMPOSED ON STAGE 3 CHRONIC KIDNEY DISEASE (HCC): Status: ACTIVE | Noted: 2018-01-01

## 2018-01-29 PROBLEM — N17.9 ACUTE RENAL FAILURE SUPERIMPOSED ON STAGE 3 CHRONIC KIDNEY DISEASE (HCC): Status: ACTIVE | Noted: 2018-01-01

## 2018-01-29 NOTE — PROGRESS NOTES
During IDT rounds, SW spoke with Samson who requested PT and OT evaluation for pt to determine needs at d/c.  SW entered orders.

## 2018-01-29 NOTE — PROGRESS NOTES
SW assessed pt with wife [facility] at bedside due to s/p cardiac arrest during replacement of his cardioverter-defibrillator. He lives with her in a one level home with seven steps at entrance. He is ADL-independent, doesn't have any DMEs (other than his pacemaker monitor), and uses a cane primarily for ambulation although he has a walker and rollator available. He confirmed his PCP and insurance as listed and plans to return home with his wife at discharge. SW discussed his code status with him since it isn't listed on his medical record. He has a pacemaker but wants to ensure he is a full code and wants every life-saving method done for him. SW notified RN of this. His wife is his HCPOA and she will provide documentation of this prior to discharge. They would like to know about resources for wheelchair ramps.   RUDY will research companies in [place] or surrounding areas and provide them with a list.

## 2018-01-29 NOTE — PROGRESS NOTES
Dr Victor Manuel Reina notified of pt's episode and once to bed fully recovered. Discussed POC and orders received.

## 2018-01-29 NOTE — PROGRESS NOTES
Discussed  POC and starting Midodrine to help with his blood pressure and possibly his kidneys. Discussed low blood pressure and events that could happen. Pt thankful that this happened here and not at home. Daril Antoine to go home, but wants to feel better.

## 2018-01-29 NOTE — PROGRESS NOTES
1/29/2018 2:14 PM    Admit Date: 1/25/2018    Admit Diagnosis: Cardiac pacemaker battery worn out [Z45.010]      Subjective:   No cp or sob- feels better      Objective:      Visit Vitals    BP 97/56    Pulse 80    Temp 97.2 °F (36.2 °C)    Resp 20    Ht 5' 8\" (1.727 m)    Wt 79.8 kg (175 lb 14.8 oz)    SpO2 99%    BMI 26.75 kg/m2       Physical Exam:  Roselynn Boot, Well Nourished, No Acute Distress, Alert & Oriented x 3, appropriate mood. Neck- supple, no JVD  CV- regular rate and rhythm no MRG  Lung- clear bilaterally  Abd- soft, nontender, nondistended  Ext- no edema bilaterally. Skin- warm and dry        Data Review:   Recent Labs      01/29/18   0418   NA  139   K  4.0   BUN  53*   CREA  2.72*       Assessment/Plan:     Principal Problem:    Cardiac arrest (HCC) (1/25/2018)Improved with current therapy. Will continue medications    Active Problems:    Ventricular fibrillation (HCC) (1/25/2018)    Chronic systolic chf- Stable. Continue current medical therapy. Acute on chronic renal failure- Improved with current therapy.  no ace/arb or beta blocker due to hypotension    Dc ramsey and O2- check ra sat- hopefully home with home PT tomorrow

## 2018-01-29 NOTE — PROGRESS NOTES
Patient ambulated with PT when he became tired and had to sit down. Patient was returned to bed when he became nauseated, pale/gray in color and had garbled speech. Neuro intact; otherwise. BP 83/49. CC notified. Within minutes patient speech clear, neuro intact, and oriented X 4. MD paged. Will continue to monitor.

## 2018-01-29 NOTE — PROGRESS NOTES
Problem: Self Care Deficits Care Plan (Adult)  Goal: *Acute Goals and Plan of Care (Insert Text)  1. Patient will complete lower body bathing and dressing with supervision and adaptive equipment as needed. 2. Patient will complete toileting with supervision. 3. Patient will tolerate 23 minutes of OT treatment with 2-3 rest breaks to increase activity tolerance for ADLs. 4. Patient will complete functional transfers with supervision and adaptive equipment as needed. Timeframe: 7 visits       OCCUPATIONAL THERAPY: Initial Assessment and PM 1/29/2018  INPATIENT: Hospital Day: 5  Payor: Claudine Guo / Plan: [address] [place] [address] HMO / Product Type: Managed Care Medicare /      NAME/AGE/GENDER: Franklin Carlos is a 80 y.o. male   PRIMARY DIAGNOSIS:  Cardiac pacemaker battery worn out [Z45.010] Cardiac arrest ([address] Cardiac arrest ([address]  Procedure(s) (LRB):  BIV ICD GEN CHANGE (N/A)  4 Days Post-Op  ICD-10: Treatment Diagnosis:    · Generalized Muscle Weakness (M62.81)  · Other lack of cordination (R27.8)   Precautions/Allergies:     Review of patient's allergies indicates no known allergies. ASSESSMENT:     Mr. Karlene Welch presents to the hospital for above procedure with cardiac arrest. Pt was sitting in chair in hallway working with PT. Pt was resting due to fatigue. Pt was alert and oriented. Pt is typically lives with wife and modified independent with ADL. Pt states he feels worn out today from all the activity. Pt completed functional mobility back to the room with minimal hand-held assistance. Pt appears a bit pale and returned back to supine in bed. Pt c/o tightness in LEs/feet and weeping noted around R ankle (nursed notified). Suddenly pt states he feels he is going to get sick. Pt coughing and spitting up with green bag provided. Pt became confused and speech garbled. Pt's BP taken in supine 83/49. Nursing staff at bedside. Pt's speech improved after a few minutes and BP was 101/54.  Pt able to answer questions appropriately. Pt demonstrated poor activity tolerance with reports of extreme fatigue. Pt is currently functioning below baseline for ADL/functional transfers and will benefit from OT services to address stated goals and plan of care. This section established at most recent assessment   PROBLEM LIST (Impairments causing functional limitations):  1. Decreased Strength  2. Decreased ADL/Functional Activities  3. Decreased Transfer Abilities  4. Decreased Ambulation Ability/Technique  5. Decreased Balance  6. Increased Pain  7. Decreased Activity Tolerance  8. Decreased Pacing Skills  9. Decreased Work Simplification/Energy Conservation Techniques  10. Increased Fatigue  11. Decreased Flexibility/Joint Mobility  12. Edema/Girth  13. Decreased Skin Integrity/Hygeine  14. Decreased [place] with Home Exercise Program   INTERVENTIONS PLANNED: (Benefits and precautions of occupational therapy have been discussed with the patient.)  1. Activities of daily living training  2. Adaptive equipment training  3. Balance training  4. Clothing management  5. Group therapy  6. Neuromuscular re-eduation  7. Therapeutic activity  8. Therapeutic exercise     TREATMENT PLAN: Frequency/Duration: Follow patient 3 times per week to address above goals. Rehabilitation Potential For Stated Goals: Good     RECOMMENDED REHABILITATION/EQUIPMENT: (at time of discharge pending progress): Due to the probability of continued deficits (see above) this patient will likely need continued skilled occupational therapy after discharge. Equipment:    TBD              OCCUPATIONAL PROFILE AND HISTORY:   History of Present Injury/Illness (Reason for Referral):  See H&P  Past Medical History/Comorbidities:   Mr. Tona Burrows  has a past medical history of Arrhythmia (2004); Atherosclerosis of native arteries of the extremities with intermittent claudication (9/16/2015);  Automatic implantable cardiac defibrillator in situ (9/16/2015); CAD (coronary artery disease); Chronic systolic heart failure ([address] (9/16/2015); Common variable immunodeficiency ([address] (9/16/2015); Congestive heart failure, unspecified (9/16/2015); Diabetes ([address] (x 15 yrs); Diseases of tricuspid valve (9/16/2015); Essential hypertension, benign (9/16/2015); Heart failure ([address] Hypertension (x 1996); Occlusion and stenosis of carotid artery without mention of cerebral infarction (9/16/2015); Other chronic pulmonary heart diseases (9/16/2015); Other primary cardiomyopathies (9/16/2015); PAD (peripheral artery disease) ([address] Pain in limb (9/16/2015); Paroxysmal ventricular tachycardia ([address] (9/16/2015); Peripheral vascular disease, unspecified (9/16/2015); and Type II or unspecified type diabetes mellitus without mention of complication, not stated as uncontrolled (9/16/2015). He also has no past medical history of Arthritis; Asthma; Autoimmune disease ([address] Cancer ([address] Chronic kidney disease; Chronic obstructive pulmonary disease ([address] Difficult intubation; GERD (gastroesophageal reflux disease); Liver disease; Malignant hyperthermia due to anesthesia; Nausea & vomiting; Pseudocholinesterase deficiency; Psychiatric disorder; PUD (peptic ulcer disease); Seizures ([address] Sleep apnea; Stroke [facility]); Thromboembolus ([address] or Thyroid disease. Mr. Dick Chavez  has a past surgical history that includes hx pacemaker and hx hernia repair. Social History/Living Environment:   Home Environment: Private residence  # Steps to Enter: 7  Rails to Enter: Yes  Hand Rails : Bilateral  One/Two Story Residence: One story  Living Alone: No  Support Systems: Spouse/Significant Other/Partner  Patient Expects to be Discharged to[de-identified] Private residence  Current DME Used/Available at Home: Christean Filter, straight, Janny Sinks, rollator  Tub or Shower Type: Shower  Prior Level of Function/Work/Activity:  Pt lives at home with his wife. Pt states that typically he uses a cane for functional mobility.  Pt reports no hx of falls. Pt was completing ADL with modified independence. Personal Factors: Other factors that influence how disability is experienced by the patient:  Multiple co-morbidities; recent cardiac arrest     Number of Personal Factors/Comorbidities that affect the Plan of Care: Extensive review of physical, cognitive, and psychosocial performance (3+):  HIGH COMPLEXITY   ASSESSMENT OF OCCUPATIONAL PERFORMANCE[de-identified]   Activities of Daily Living:           Basic ADLs (From Assessment) Complex ADLs (From Assessment)   Basic ADL  Feeding: Supervision  Oral Facial Hygiene/Grooming: Minimum assistance  Bathing: Moderate assistance  Upper Body Dressing: Minimum assistance  Lower Body Dressing: Moderate assistance  Toileting: Moderate assistance Instrumental ADL  Meal Preparation: Maximum assistance  Homemaking: Total assistance   Grooming/Bathing/Dressing Activities of Daily Living     Cognitive Retraining  Safety/Judgement: Awareness of environment                 Functional Transfers  Toilet Transfer : Minimum assistance  Tub Transfer:  Moderate assistance  Shower Transfer: Minimum assistance     Bed/Mat Mobility  Sit to Supine: Minimum assistance  Sit to Stand: Minimum assistance  Bed to Chair: Minimum assistance        Most Recent Physical Functioning:   Gross Assessment:  AROM: Generally decreased, functional  Strength: Generally decreased, functional  Coordination: Generally decreased, functional               Posture:     Balance:  Sitting: Intact  Standing: Impaired  Standing - Static: Constant support  Standing - Dynamic : Fair Bed Mobility:  Sit to Supine: Minimum assistance  Wheelchair Mobility:     Transfers:  Sit to Stand: Minimum assistance  Stand to Sit: Minimum assistance  Bed to Chair: Minimum assistance              Patient Vitals for the past 6 hrs:   BP SpO2 Pulse   01/29/18 1613 (!) 83/49 - 80   01/29/18 1614 101/54 94 % 84   01/29/18 1615 101/54 - 80       Mental Status  Neurologic State: Alert  Orientation Level: Oriented X4  Cognition: Appropriate for age attention/concentration, Follows commands  Perception: Appears intact  Perseveration: No perseveration noted  Safety/Judgement: Awareness of environment                          Physical Skills Involved:  1. Balance  2. Strength  3. Activity Tolerance  4. Pain (acute)  5. Edema Cognitive Skills Affected (resulting in the inability to perform in a timely and safe manner):  1. [facility] Psychosocial Skills Affected:  1. Habits/Routines   Number of elements that affect the Plan of Care: 5+:  HIGH COMPLEXITY   CLINICAL DECISION MAKIN[address] [place] [address] AM-PAC 6 Clicks   Daily Activity Inpatient Short Form  How much help from another person does the patient currently need. .. Total A Lot A Little None   1. Putting on and taking off regular lower body clothing? [] 1   [x] 2   [] 3   [] 4   2. Bathing (including washing, rinsing, drying)? [] 1   [x] 2   [] 3   [] 4   3. Toileting, which includes using toilet, bedpan or urinal?   [] 1   [x] 2   [] 3   [] 4   4. Putting on and taking off regular upper body clothing? [] 1   [] 2   [x] 3   [] 4   5. Taking care of personal grooming such as brushing teeth? [] 1   [] 2   [x] 3   [] 4   6. Eating meals? [] 1   [] 2   [x] 3   [] 4   © , Trustees of [address] [place] [address], under license to [employer]. All rights reserved      Score:  Initial: 15 Most Recent: X (Date: -- )    Interpretation of Tool:  Represents activities that are increasingly more difficult (i.e. Bed mobility, Transfers, Gait). Score 24 23 22-20 19-15 14-10 9-7 6     Modifier CH CI CJ CK CL CM CN      ?  Self Care:     - CURRENT STATUS: CK - 40%-59% impaired, limited or restricted    - GOAL STATUS: CJ - 20%-39% impaired, limited or restricted    - D/C STATUS:  ---------------To be determined---------------  Payor: Marky Han / Plan: [address] [place] [address] HMO / Product Type: Managed Care Medicare /      Medical Necessity:     · Patient demonstrates good rehab potential due to higher previous functional level. Reason for Services/Other Comments:  · Patient continues to require skilled intervention due to decreased independence and activity tolerance for ADL/functional moblity. Use of outcome tool(s) and clinical judgement create a POC that gives a: MODERATE COMPLEXITY         TREATMENT:   (In addition to Assessment/Re-Assessment sessions the following treatments were rendered)     Pre-treatment Symptoms/Complaints:    Pain: Initial:   Pain Intensity 1: 0  Post Session:  0/10     Assessment/Reassessment only, no treatment provided today    Braces/Orthotics/Lines/Etc:   · O2 Device: Room air  Treatment/Session Assessment:    · Response to Treatment:  Evaluation only. · Interdisciplinary Collaboration:   o Occupational Therapist  o Registered Nurse  o Nurse Coordinator  · After treatment position/precautions:   o Supine in bed  o Bed/Chair-wheels locked  o Call light within reach  o RN notified   · Compliance with Program/Exercises: Will assess as treatment progresses. · Recommendations/Intent for next treatment session: \"Next visit will focus on advancements to more challenging activities and reduction in assistance provided\".   Total Treatment Duration:  OT Patient Time In/Time Out  Time In: 1605  Time Out: [facility],

## 2018-01-29 NOTE — PROGRESS NOTES
[facility] progress note    Follow-Up on: 1/29/2018     Patient seen and examined. Up in chair. Still reporting a cough. Appetite is ok. ROS:  Gen - no fever, no chills, appetite improving  CV - no chest pain, no orthopnea  Lung - noted shortness of breath, noted cough  Abd - no tenderness, no nausea, no vomiting  Ext - no edema    Exam:  Vitals:    01/28/18 2236 01/29/18 0303 01/29/18 0400 01/29/18 0657   BP: (!) 83/51 (!) 86/49  92/51   Pulse: 80 80  80   Resp: 18 18 16   Temp: 98.8 °F (37.1 °C) 98.2 °F (36.8 °C)  97.9 °F (36.6 °C)   SpO2: 95% 95%  97%   Weight:   79.8 kg (175 lb 14.8 oz)    Height:             Intake/Output Summary (Last 24 hours) at 01/29/18 1017  Last data filed at 01/29/18 0946   Gross per 24 hour   Intake             1260 ml   Output              525 ml   Net              735 ml       GEN - in no distress  CV - S1, S2  Lung - bibasilar rales R>L  Abd - soft, nontender  Ext - no edema    No results for input(s): WBC, HGB, HCT, PLT, HGBEXT, HCTEXT, PLTEXT in the last 72 hours. Recent Labs      01/29/18   0418  01/28/18   0428  01/27/18   1527   NA  139  137  141   K  4.0  3.9  3.8   CL  104  106  106   CO2  22  22  26   BUN  53*  53*  53*   CREA  2.72*  2.89*  3.03*   CA  8.5  8.2*  8.0*   GLU  139*  125*  140*       Assessment / Plan:    1. CHANTE/CKD Stage IIIB  - Progressive CKD as an outpatient  - Cr about 1.4 in 2015, 1.6 in Jul and Oct' 17 and 2.0 in 11/17  - Admission Cr 1.9, 2.9 post-arrest, was also getting Toradol  -hemodynamics limiting renal recovery    2. S/p cardiac arrest    3. S/p AICD/pacer generator replacement       4.   Hypotension    Renal function stabilizing.  Ok for ramsey removal once safe to ambulate.

## 2018-01-29 NOTE — PROGRESS NOTES
Problem: Mobility Impaired (Adult and Pediatric)  Goal: *Acute Goals and Plan of Care (Insert Text)  Discharge Goals:  (1.)Mr. Lendia Kussmaul will move from supine to sit and sit to supine , scoot up and down and roll side to side with INDEPENDENT within 7 day(s). (2.)Mr. Lendia Kussmaul will transfer from bed to chair and chair to bed with SUPERVISION using the least restrictive device within 7 day(s). (3.)Mr. Lendia Kussmaul will ambulate with SUPERVISION for 250+ feet with the least restrictive device within 7 day(s). (4.)Mr. Lendia Kussmaul will ascend/descend 7 steps with use of railing with STAND BY ASSIST within 7 days. ________________________________________________________________________________________________      PHYSICAL THERAPY: Initial Assessment, Treatment Day: Day of Assessment, PM 1/29/2018  INPATIENT: Hospital Day: 5  Payor: Carlos Gonzalez / Plan: [address] [place] [address] HMO / Product Type: Managed Care Medicare /      NAME/AGE/GENDER: Adam Stubbs is a 80 y.o. male   PRIMARY DIAGNOSIS: Cardiac pacemaker battery worn out [Z45.010] Cardiac arrest [address] Cardiac arrest ([facility]  Procedure(s) (LRB):  BIV ICD GEN CHANGE (N/A)  4 Days Post-Op  ICD-10: Treatment Diagnosis:   · Generalized Muscle Weakness (M62.81)  · Difficulty in walking, Not elsewhere classified (R26.2)   Precaution/Allergies:  Review of patient's allergies indicates no known allergies. ASSESSMENT:     Mr. Lendia Kussmaul is sitting up in bedside chair upon contact and agreeable to PT evaluation this afternoon. Pt reports 0/10 pain prior to mobility. Pt lives in 1 story home with his wife with 7 steps to enter with B railing. Pt reports use of cane for gait and independence with ADLs with 0 falls. Pt performed sit to stand with CGA and ambulated 30 ft in hallway with B HHA. Pt reported being very \"give out\" and required a seated rest break. OT now present for evaluation. PT teched for OT and pt ambulated back to room with B HHA. Pt left in OT care.  Pt may benefit from rehab versus HHPT at discharge from acute setting. Paulino Santacruz will benefit from skilled PT (medically necessary) to address decreased strength, decreased balance, decreased functional tolerance, decreased cardiopulmonary endurance affecting participation in basic ADLs and functional tasks. This section established at most recent assessment   PROBLEM LIST (Impairments causing functional limitations):  1. Decreased Strength  2. Decreased ADL/Functional Activities  3. Decreased Transfer Abilities  4. Decreased Ambulation Ability/Technique  5. Decreased Balance  6. Decreased Activity Tolerance  7. Decreased Pacing Skills  8. Increased Fatigue  9. Decreased [place] with Home Exercise Program   INTERVENTIONS PLANNED: (Benefits and precautions of physical therapy have been discussed with the patient.)  1. Balance Exercise  2. Bed Mobility  3. Family Education  4. Gait Training  5. Home Exercise Program (HEP)  6. Neuromuscular Re-education/Strengthening  7. Range of Motion (ROM)  8. Therapeutic Activites  9. Therapeutic Exercise/Strengthening  10. Transfer Training  11. Group Therapy     TREATMENT PLAN: Frequency/Duration: 3 times a week for duration of hospital stay  Rehabilitation Potential For Stated Goals: Leanne Osborn REHABILITATION/EQUIPMENT: (at time of discharge pending progress): Due to the probability of continued deficits (see above) this patient will likely need continued skilled physical therapy after discharge. Equipment:    None at this time              HISTORY:   History of Present Injury/Illness (Reason for Referral):  See H&P below  Patient with extensive CAD, Sys CHF, Pulm HTN, Sev MR developed cardiac arrest during biv/ICD gen Change today. Past Medical History/Comorbidities:   Mr. Angie Lane  has a past medical history of Arrhythmia (2004); Atherosclerosis of native arteries of the extremities with intermittent claudication (9/16/2015);  Automatic implantable cardiac defibrillator in situ (9/16/2015); CAD (coronary artery disease); Chronic systolic heart failure ([address] (9/16/2015); Common variable immunodeficiency ([address] (9/16/2015); Congestive heart failure, unspecified (9/16/2015); Diabetes ([address] (x 15 yrs); Diseases of tricuspid valve (9/16/2015); Essential hypertension, benign (9/16/2015); Heart failure ([address] Hypertension (x 1996); Occlusion and stenosis of carotid artery without mention of cerebral infarction (9/16/2015); Other chronic pulmonary heart diseases (9/16/2015); Other primary cardiomyopathies (9/16/2015); PAD (peripheral artery disease) ([address] Pain in limb (9/16/2015); Paroxysmal ventricular tachycardia ([address] (9/16/2015); Peripheral vascular disease, unspecified (9/16/2015); and Type II or unspecified type diabetes mellitus without mention of complication, not stated as uncontrolled (9/16/2015). He also has no past medical history of Arthritis; Asthma; Autoimmune disease ([address] Cancer ([address] Chronic kidney disease; Chronic obstructive pulmonary disease ([address] Difficult intubation; GERD (gastroesophageal reflux disease); Liver disease; Malignant hyperthermia due to anesthesia; Nausea & vomiting; Pseudocholinesterase deficiency; Psychiatric disorder; PUD (peptic ulcer disease); Seizures ([address] Sleep apnea; Stroke [facility]); Thromboembolus ([address] or Thyroid disease. Mr. Luc Barragan  has a past surgical history that includes hx pacemaker and hx hernia repair.   Social History/Living Environment:   Home Environment: Private residence  # Steps to Enter: 7  Rails to Enter: Yes  Hand Rails : Bilateral  One/Two Story Residence: One story  Living Alone: No  Support Systems: Spouse/Significant Other/Partner  Patient Expects to be Discharged to[de-identified] Private residence  Current DME Used/Available at Home: Christian Cheers, straight, Lawayne [place], rollator  Prior Level of Function/Work/Activity:  Lives with wife, use of cane for gait, indep with ADLs, 0 falls   Number of Personal Factors/Comorbidities that affect the Plan of Care: 3+: HIGH COMPLEXITY   EXAMINATION:   Most Recent Physical Functioning:   Gross Assessment:  AROM: Generally decreased, functional  Strength: Generally decreased, functional  Coordination: Generally decreased, functional  Sensation: Intact               Posture:     Balance:  Sitting: Intact; Without support  Standing: Intact; With support Bed Mobility:     Wheelchair Mobility:     Transfers:  Sit to Stand: Contact guard assistance  Stand to Sit: Contact guard assistance  Gait:     Base of Support: Narrowed  Speed/Maru: Slow  Step Length: Left shortened;Right shortened  Gait Abnormalities: Decreased step clearance; Path deviations  Distance (ft): 30 Feet (ft)  Assistive Device:  (HHA)  Ambulation - Level of Assistance: Contact guard assistance  Interventions: Safety awareness training; Tactile cues; Verbal cues      Body Structures Involved:  1. Nerves  2. Heart  3. Lungs  4. Bones  5. Muscles Body Functions Affected:  1. Sensory/Pain  2. Cardio  3. Respiratory  4. Neuromusculoskeletal  5. Movement Related Activities and Participation Affected:  1. General Tasks and Demands  2. Mobility  3. Self Care  4. Domestic Life  5. Interpersonal Interactions and Relationships  6. Community, Social and [place]   Number of elements that affect the Plan of Care: 4+: HIGH COMPLEXITY   CLINICAL PRESENTATION:   Presentation: Evolving clinical presentation with changing clinical characteristics: MODERATE COMPLEXITY   CLINICAL DECISION MAKIN [facility] Inpatient Short Form  How much difficulty does the patient currently have. .. Unable A Lot A Little None   1. Turning over in bed (including adjusting bedclothes, sheets and blankets)? [] 1   [] 2   [x] 3   [] 4   2. Sitting down on and standing up from a chair with arms ( e.g., wheelchair, bedside commode, etc.)   [] 1   [] 2   [x] 3   [] 4   3.   Moving from lying on back to sitting on the side of the bed? [] 1   [] 2   [x] 3   [] 4   How much help from another person does the patient currently need. .. Total A Lot A Little None   4. Moving to and from a bed to a chair (including a wheelchair)? [] 1   [] 2   [x] 3   [] 4   5. Need to walk in hospital room? [] 1   [] 2   [x] 3   [] 4   6. Climbing 3-5 steps with a railing? [x] 1   [] 2   [] 3   [] 4   © 2007, Trustees of [address] [place] [address] [address] [address], under license to [employer]. All rights reserved      Score:  Initial:16 Most Recent: X (Date: -- )    Interpretation of Tool:  Represents activities that are increasingly more difficult (i.e. Bed mobility, Transfers, Gait). Score 24 23 22-20 19-15 14-10 9-7 6     Modifier CH CI CJ CK CL CM CN      ? Mobility - Walking and Moving Around:     - CURRENT STATUS: CK - 40%-59% impaired, limited or restricted    - GOAL STATUS: CJ - 20%-39% impaired, limited or restricted    - D/C STATUS:  ---------------To be determined---------------  Payor: Eliseo Paz / Plan: [address] [place] [address] HMO / Product Type: Managed Care Medicare /      Medical Necessity:     · Patient is expected to demonstrate progress in strength, balance, coordination and functional technique to decrease assistance required with gait, transfers, and tolerance for functional mobility. Reason for Services/Other Comments:  · Patient continues to require skilled intervention due to decreased strength, decreased balance, decreased functional tolerance, decreased cardiopulmonary endurance affecting participation in basic ADLs and functional tasks.    Use of outcome tool(s) and clinical judgement create a POC that gives a: Questionable prediction of patient's progress: MODERATE COMPLEXITY            TREATMENT:   (In addition to Assessment/Re-Assessment sessions the following treatments were rendered)   Pre-treatment Symptoms/Complaints:  Weakness, fatigue  Pain: Initial:   Pain Intensity 1: 0  Post Session:  0/10 Assessment/Reassessment only, no treatment provided today    Braces/Orthotics/Lines/Etc:   · none  Treatment/Session Assessment:    · Response to Treatment:  Pt ambulated 30 ft with B HHA requiring seated rest break. · Interdisciplinary Collaboration:   o Physical Therapist  o Occupational Therapist  o Registered Nurse  · After treatment position/precautions:   o Supine in bed  o Bed/Chair-wheels locked  o Bed in low position  o Call light within reach  o RN notified   · Compliance with Program/Exercises: Will assess as treatment progresses. · Recommendations/Intent for next treatment session: \"Next visit will focus on advancements to more challenging activities and reduction in assistance provided\".   Total Treatment Duration:  PT Patient Time In/Time Out  Time In: 1554  Time Out: [address]

## 2018-01-29 NOTE — PROGRESS NOTES
Bedside and Verbal shift change report given to Costa Mckeon (oncoming nurse) by Bruce Thompson (offgoing nurse). Report included the following information SBAR, Kardex, Intake/Output, MAR, Recent Results and Med Rec Status.

## 2018-01-30 NOTE — DISCHARGE SUMMARY
[facility] Cardiology Discharge Summary     Patient ID:  Adam Posey  [id number]  10 y.o.  1935    Admit date: 1/25/2018    Discharge date and time:  1-30-18    Admitting Physician: Lubna Morrissey MD     Discharge Physician: Jean Paul Massey NP/    Admission Diagnoses: Cardiac pacemaker battery worn out [Z45.010]    Discharge Diagnoses:   Patient Active Problem List    Diagnosis Date Noted    Acute renal failure superimposed on stage 3 chronic kidney disease ([facility] 01/29/2018    Ventricular fibrillation ([facility] 01/25/2018    Cardiac arrest ([facility] 01/25/2018    Cardiomyopathy, ischemic 07/19/2016    Hyperlipidemia 07/19/2016    Carotid stenosis, asymptomatic 07/19/2016    Other chronic pulmonary heart diseases 09/16/2015    Paroxysmal ventricular tachycardia ([facility] 09/16/2015    Chronic systolic heart failure ([facility] 09/16/2015    Occlusion and stenosis of carotid artery without mention of cerebral infarction 09/16/2015    Automatic implantable cardiac defibrillator in situ 09/16/2015    Common variable immunodeficiency ([facility] 09/16/2015    Peripheral vascular disease, unspecified 09/16/2015    Atherosclerosis of native arteries of the extremities with intermittent claudication 09/16/2015    Pain in limb 09/16/2015    Other primary cardiomyopathies 09/16/2015    Diseases of tricuspid valve 09/16/2015    Type II or unspecified type diabetes mellitus without mention of complication, not stated as uncontrolled 09/16/2015    Essential hypertension, benign 09/16/2015    Congestive heart failure, unspecified 09/16/2015       Cardiology Procedures this admission:  Pacemaker Implantation, CXR  Consults: none    Hospital Course: Patient came into [facility] for generator change with Dr. Payton Villegas on 1/25/18. During procedure had cardiac arrest felt to be secondary from end stage cardiomyopathy and resp arrest from sedation. Received CPR and stabilized on epi gtt then transferred to CVICU.  The patient has a successful generator change of a [employer] biventricular implantable cardioverter-defibrillator. Follow up CXR showed no pneumothorax. Patient continued to improved but his renal function was worse. Nephrology was consulted forn CHANTE/CKD and thought possible chronic hypotension contributed to his progressive CKD. The afternoon of 1/30/18, the patient was up feeling better. Nephrology saw patient prior to d/c. Patient was able to void without any difficulty. Pt's left subclavian PM site was clean, dry and intact without hematoma. Patient's CR at discharge was 2.67 (admission Cr 1.92). Pt was seen and examined by Dr. Chaka Zhao and determined stable and ready for discharge. The patient has chronic systolic heart failure but will not be discharged home on ACE-I/ARB or even BB due to chronic hypotension needing renal perfusion. The patient has CAD but not a candidate for revascularization, medical therapy only. The patient will continue home mediocations Asa and statin. Pt was instructed to follow PM discharge instructions given by nursing staff. The patient will follow up with Dr. Chaka Zhao on 2/8/18 at [phone] in [facility]. The patient will need BMP one day prior to appointment with Dr. Chaka Zhao. DISPOSITION: The patient is being discharged home in stable condition on a low saturated fat, low cholesterol and low salt diet. Pt is instructed to advance activities as tolerated limited to fatigue or shortness of breath. Pt is instructed not to lift anything over 5-10 lbs with affected arm. No pushing or pulling or lifting arm above shoulder. See complete discharge instructions. Pt is instructed to watch PM site for bleeding/oozing, if seen Pt is instructed to apply firm pressure with clean cloth and call office at [phone]. Pt is instructed to watch for signs of infection which include increasing area of redness around site, fever/hot to touch or purulent drainage. Pt is instructed to call office or return to ER for immediate evaluation of any shortness of breath, chest pain or palpitations. Discharge Exam:   Visit Vitals    /58 (BP 1 Location: Left arm, BP Patient Position: Sitting)    Pulse 80    Temp 97 °F (36.1 °C)    Resp 18    Ht 5' 8\" (1.727 m)    Wt 87.5 kg (192 lb 12.8 oz)    SpO2 97%    BMI 29.32 kg/m2   Pt has been seen by Dr. Radha Meadows: see his progress note for exam details. Recent Results (from the past 24 hour(s))   METABOLIC PANEL, BASIC    Collection Time: 01/30/18  3:38 AM   Result Value Ref Range    Sodium 137 136 - 145 mmol/L    Potassium 3.9 3.5 - 5.1 mmol/L    Chloride 102 98 - 107 mmol/L    CO2 22 21 - 32 mmol/L    Anion gap 13 7 - 16 mmol/L    Glucose 157 (H) 65 - 100 mg/dL    BUN 52 (H) 8 - 23 MG/DL    Creatinine 2.67 (H) 0.8 - 1.5 MG/DL    GFR est AA 30 (L) >60 ml/min/1.73m2    GFR est non-AA 24 (L) >60 ml/min/1.73m2    Calcium 8.2 (L) 8.3 - 10.4 MG/DL         Patient Instructions:   Current Discharge Medication List      START taking these medications    Details   cephALEXin (KEFLEX) 500 mg capsule Take 1 Cap by mouth three (3) times daily. Qty: 42 Cap, Refills: 0         CONTINUE these medications which have NOT CHANGED    Details   brinzolamide (AZOPT) 1 % ophthalmic suspension Administer 1 Drop to both eyes two (2) times a day. amiodarone (CORDARONE) 200 mg tablet Take 1 Tab by mouth daily. Qty: 90 Tab, Refills: 3    Associated Diagnoses: Paroxysmal ventricular tachycardia (HCC)      atorvastatin (LIPITOR) 40 mg tablet Take 1 Tab by mouth nightly. Qty: 90 Tab, Refills: 3    Associated Diagnoses: Hyperlipidemia, unspecified hyperlipidemia type      clopidogrel (PLAVIX) 75 mg tab Take 1 Tab by mouth daily. Qty: 90 Tab, Refills: 3      Iron [id number] (MULTIGEN FOLIC) tab capsule Take 1 Tab by mouth daily. levothyroxine (SYNTHROID) 25 mcg tablet Take  by mouth Daily (before breakfast).       glyBURIDE-metformin (GLUCOVANCE) 2.5-500 mg per tablet Take 2 Tabs by mouth Daily (before breakfast). Indications: 5-500  mg      aspirin 81 mg chewable tablet Take 81 mg by mouth every morning. latanoprost (XALATAN) 0.005 % ophthalmic solution Administer 1 Drop to both eyes nightly. furosemide (LASIX) 40 mg tablet Take 1 Tab by mouth as needed.   Qty: 90 Tab, Refills: 3    Associated Diagnoses: Cardiomyopathy, ischemic         STOP taking these medications       hydroCHLOROthiazide (HYDRODIURIL) 12.5 mg tablet Comments:   Reason for Stopping:         carvedilol (COREG) 25 mg tablet Comments:   Reason for Stopping:         enalapril (VASOTEC) 20 mg tablet Comments:   Reason for Stopping:                 Signed:  Aguila Duque NP  1/30/2018  3:18 PM

## 2018-01-30 NOTE — PROGRESS NOTES
Patient's left arm ecchymotic and swollen. Weeping moderate amount of serosanguinous fluid. Covered with foam pad and gauze wrap. Patient states he bumped his arm against chair a few days ago causing the bruising and swelling. Dr. Ning Hendersons aware and has visualized site. Wound care consulted.

## 2018-01-30 NOTE — PROGRESS NOTES
Discharge instructions, prescriptions, and follow up appointments reviewed and given to patient. All personal belongings taken with patient. Patient escorted to discharge area via wheelchair. Family member will drive patient home. Patient is stable at discharge. Patient voices understanding of discharge teaching. Home medications returned to patient. Right groin site is intact with no bleeding, swelling, or hematoma noted. Mild ecchymosis present. Left upper chest incision dry and intact with scant amount of old drainage.

## 2018-01-30 NOTE — PROGRESS NOTES
Pt only voiding small amounts since ramsey removal. Pt unable to void in urinal after multiple attempts, states \"I feel the urge to urinate, but can't. \" Abdomen distended. Bladder scanned: 497ml found. In/out catheterization performed per protocol using sterile technique, 400ml nj urine with sediment drained. Pt states he feels relief.

## 2018-01-30 NOTE — INTERDISCIPLINARY ROUNDS
Interdisciplinary team rounds were held 1/30/2018 with the following team members:Care Management, Nursing, Physical Therapy, Physician and Clinical Coordinator and the patient. Plan of care discussed. See clinical pathway and/or care plan for interventions and desired outcomes.

## 2018-01-30 NOTE — PROGRESS NOTES
Bedside shift report received from Nae Hernandez RN (offgoing nurse). Report given to Mariola Burks RN & Brooke Snyder, Student. Vital signs stable. No complaints present. Patient in stable condition.

## 2018-01-30 NOTE — PROGRESS NOTES
Patient's O2 sat 100% on 2L NC. Placed on room air and monitored sat continuously for 20 mins. Patient remains 100%. Will remain on room air and monitor. Dr. Chaka Zhao aware. Given orders to discontinue IV lasix order.

## 2018-01-30 NOTE — PROGRESS NOTES
Bedside and Verbal shift change report given to Nanda Buerger (oncoming nurse) by Tatyana Andrews (offgoing nurse). Report included the following information SBAR, Kardex, Intake/Output, MAR, Recent Results and Med Rec Status.

## 2018-01-30 NOTE — PROGRESS NOTES
Problem: Mobility Impaired (Adult and Pediatric)  Goal: *Acute Goals and Plan of Care (Insert Text)  Discharge Goals:  (1.)Mr. Abraham Diamond will move from supine to sit and sit to supine , scoot up and down and roll side to side with INDEPENDENT within 7 day(s). (2.)Mr. Abraham Diamond will transfer from bed to chair and chair to bed with SUPERVISION using the least restrictive device within 7 day(s). (3.)Mr. Abraham Diamond will ambulate with SUPERVISION for 250+ feet with the least restrictive device within 7 day(s). (4.)Mr. Abraham Diamond will ascend/descend 7 steps with use of railing with STAND BY ASSIST within 7 days. ________________________________________________________________________________________________      PHYSICAL THERAPY: Daily Note, Treatment Day: 1st, AM 1/30/2018  INPATIENT: Hospital Day: 6  Payor: Karina Gonzales / Plan: [address] [place] [address] HMO / Product Type: [employer] Medicare /      NAME/AGE/GENDER: Carlos Chester is a 80 y.o. male   PRIMARY DIAGNOSIS: Cardiac pacemaker battery worn out [Z45.010] Cardiac arrest ([facility] Cardiac arrest ([facility]  Procedure(s) (LRB):  BIV ICD GEN CHANGE (N/A)  5 Days Post-Op  ICD-10: Treatment Diagnosis:   · Generalized Muscle Weakness (M62.81)  · Difficulty in walking, Not elsewhere classified (R26.2)   Precaution/Allergies:  Review of patient's allergies indicates no known allergies. ASSESSMENT:     Mr. Abraham Diamond presents sitting up in bedside chair. He is agreeable to treatment today. He was instructed in seated exercises. He demonstrated good technique with exercises. He then stood with CGA and ambulated into bathroom and stepped into shower with CGA. Good progress with activity tolerance today. He was left in shower with LOPEZ. Will continue PT efforts. This section established at most recent assessment   PROBLEM LIST (Impairments causing functional limitations):  1. Decreased Strength  2. Decreased ADL/Functional Activities  3. Decreased Transfer Abilities  4.  Decreased Ambulation Ability/Technique  5. Decreased Balance  6. Decreased Activity Tolerance  7. Decreased Pacing Skills  8. Increased Fatigue  9. Decreased [place] with Home Exercise Program   INTERVENTIONS PLANNED: (Benefits and precautions of physical therapy have been discussed with the patient.)  1. Balance Exercise  2. Bed Mobility  3. Family Education  4. Gait Training  5. Home Exercise Program (HEP)  6. Neuromuscular Re-education/Strengthening  7. Range of Motion (ROM)  8. Therapeutic Activites  9. Therapeutic Exercise/Strengthening  10. Transfer Training  11. Group Therapy     TREATMENT PLAN: Frequency/Duration: 3 times a week for duration of hospital stay  Rehabilitation Potential For Stated Goals: Gabriele Celis REHABILITATION/EQUIPMENT: (at time of discharge pending progress): Due to the probability of continued deficits (see above) this patient will likely need continued skilled physical therapy after discharge. Equipment:    None at this time              HISTORY:   History of Present Injury/Illness (Reason for Referral):  See H&P below  Patient with extensive CAD, Sys CHF, Pulm HTN, Sev MR developed cardiac arrest during biv/ICD gen Change today. Past Medical History/Comorbidities:   Mr. Carmen Otoole  has a past medical history of Arrhythmia (2004); Atherosclerosis of native arteries of the extremities with intermittent claudication (9/16/2015); Automatic implantable cardiac defibrillator in situ (9/16/2015); CAD (coronary artery disease); Chronic systolic heart failure ([address] (9/16/2015); Common variable immunodeficiency ([address] (9/16/2015); Congestive heart failure, unspecified (9/16/2015); Diabetes ([address] (x 15 yrs); Diseases of tricuspid valve (9/16/2015); Essential hypertension, benign (9/16/2015); Heart failure ([address] Hypertension (x 1996); Occlusion and stenosis of carotid artery without mention of cerebral infarction (9/16/2015); Other chronic pulmonary heart diseases (9/16/2015);  Other primary cardiomyopathies (9/16/2015); PAD (peripheral artery disease) ([facility] Pain in limb (9/16/2015); Paroxysmal ventricular tachycardia ([address] (9/16/2015); Peripheral vascular disease, unspecified (9/16/2015); and Type II or unspecified type diabetes mellitus without mention of complication, not stated as uncontrolled (9/16/2015). He also has no past medical history of Arthritis; Asthma; Autoimmune disease ([address] Cancer ([address] Chronic kidney disease; Chronic obstructive pulmonary disease ([facility] Difficult intubation; GERD (gastroesophageal reflux disease); Liver disease; Malignant hyperthermia due to anesthesia; Nausea & vomiting; Pseudocholinesterase deficiency; Psychiatric disorder; PUD (peptic ulcer disease); Seizures ([facility] Sleep apnea; Stroke [facility]); Thromboembolus ([facility] or Thyroid disease. Mr. Parag Magallon  has a past surgical history that includes hx pacemaker and hx hernia repair. Social History/Living Environment:   Home Environment: Private residence  # Steps to Enter: 7  Rails to Enter: Yes  Hand Rails : Bilateral  One/Two Story Residence: One story  Living Alone: No  Support Systems: Spouse/Significant Other/Partner  Patient Expects to be Discharged to[de-identified] Private residence  Current DME Used/Available at Home: Maegan Bloodgood, straight, Mardene Draft, rollator  Tub or Shower Type: Shower  Prior Level of Function/Work/Activity:  Lives with wife, use of cane for gait, indep with ADLs, 0 falls   Number of Personal Factors/Comorbidities that affect the Plan of Care: 3+: HIGH COMPLEXITY   EXAMINATION:   Most Recent Physical Functioning:   Gross Assessment:                  Posture:  Posture (WDL): Exceptions to WDL  Posture Assessment:  Forward head, Rounded shoulders  Balance:  Sitting: Intact  Standing: Impaired  Standing - Static: Fair  Standing - Dynamic : Fair Bed Mobility:     Wheelchair Mobility:     Transfers:  Sit to Stand: Contact guard assistance  Stand to Sit: Contact guard assistance  Bed to Chair: Contact guard assistance (From chair to shower chair)  Gait:     Base of Support: Narrowed  Speed/Maru: Slow  Step Length: Left shortened;Right shortened  Gait Abnormalities: Decreased step clearance; Path deviations  Distance (ft): 10 Feet (ft)  Assistive Device:  (handheld assistance)  Ambulation - Level of Assistance: Contact guard assistance  Interventions: Safety awareness training;Verbal cues      Body Structures Involved:  1. Nerves  2. Heart  3. Lungs  4. Bones  5. Muscles Body Functions Affected:  1. Sensory/Pain  2. Cardio  3. Respiratory  4. Neuromusculoskeletal  5. Movement Related Activities and Participation Affected:  1. General Tasks and Demands  2. Mobility  3. Self Care  4. Domestic Life  5. Interpersonal Interactions and Relationships  6. Community, Social and [place]   Number of elements that affect the Plan of Care: 4+: HIGH COMPLEXITY   CLINICAL PRESENTATION:   Presentation: Evolving clinical presentation with changing clinical characteristics: MODERATE COMPLEXITY   CLINICAL DECISION MAKIN [facility] Mobility Inpatient Short Form  How much difficulty does the patient currently have. .. Unable A Lot A Little None   1. Turning over in bed (including adjusting bedclothes, sheets and blankets)? [] 1   [] 2   [x] 3   [] 4   2. Sitting down on and standing up from a chair with arms ( e.g., wheelchair, bedside commode, etc.)   [] 1   [] 2   [x] 3   [] 4   3. Moving from lying on back to sitting on the side of the bed? [] 1   [] 2   [x] 3   [] 4   How much help from another person does the patient currently need. .. Total A Lot A Little None   4. Moving to and from a bed to a chair (including a wheelchair)? [] 1   [] 2   [x] 3   [] 4   5. Need to walk in hospital room? [] 1   [] 2   [x] 3   [] 4   6. Climbing 3-5 steps with a railing? [x] 1   [] 2   [] 3   [] 4   © , Trustees of [address] [place] [address] [address] [id number], under license to [employer]  All rights reserved Score:  Initial:16 Most Recent: X (Date: -- )    Interpretation of Tool:  Represents activities that are increasingly more difficult (i.e. Bed mobility, Transfers, Gait). Score 24 23 22-20 19-15 14-10 9-7 6     Modifier CH CI CJ CK CL CM CN      ? Mobility - Walking and Moving Around:     - CURRENT STATUS: CK - 40%-59% impaired, limited or restricted    - GOAL STATUS: CJ - 20%-39% impaired, limited or restricted    - D/C STATUS:  ---------------To be determined---------------  Payor: Cricket Grubbs / Plan: [address] [place] [address] HMO / Product Type: [employer] Medicare /      Medical Necessity:     · Patient is expected to demonstrate progress in strength, balance, coordination and functional technique to decrease assistance required with gait, transfers, and tolerance for functional mobility. Reason for Services/Other Comments:  · Patient continues to require skilled intervention due to decreased strength, decreased balance, decreased functional tolerance, decreased cardiopulmonary endurance affecting participation in basic ADLs and functional tasks. Use of outcome tool(s) and clinical judgement create a POC that gives a: Questionable prediction of patient's progress: MODERATE COMPLEXITY            TREATMENT:   (In addition to Assessment/Re-Assessment sessions the following treatments were rendered)   Pre-treatment Symptoms/Complaints:  \"I saw you out in the hallway. \"  Pain: Initial:   Pain Intensity 1: 0  Post Session:  0/10     Therapeutic Activity: (    15 Minutes): Therapeutic activities including Chair transfers, Tub/shower transfers, Ambulation on level ground and exercises to improve mobility, strength, balance and activity tolerance. Required minimal Safety awareness training;Verbal cues to promote dynamic balance in standing.         Date:  1-30-18 Date:   Date:     ACTIVITY/EXERCISE AM PM AM PM AM PM   Ambulation:           Distance  Device  Duration         Seated Heel Raises x10        Seated Toe Raises x10        Seated Long Arc Quads x10        Seated Marching x10        Seated Hip Abduction                  B = bilateral; AA = active assistive; A = active; P = passive        Braces/Orthotics/Lines/Etc:   · none  Treatment/Session Assessment:    · Response to Treatment:  Tolerated well  · Interdisciplinary Collaboration:   o Physical Therapy Assistant  o Certified Occupational Therapy Assistant  o Registered Nurse  · After treatment position/precautions:   o Bed/Chair-wheels locked  o Bed in low position  o RN notified  o Family at bedside  o left in shower chair with LOPEZ attending   · Compliance with Program/Exercises: Will assess as treatment progresses. · Recommendations/Intent for next treatment session: \"Next visit will focus on advancements to more challenging activities and reduction in assistance provided\".   Total Treatment Duration:  PT Patient Time In/Time Out  Time In: 1005  Time Out: [address], [place]

## 2018-01-30 NOTE — PROGRESS NOTES
[facility] CARDIOLOGY PROGRESS NOTE           1/30/2018 7:26 AM    Admit Date: 1/25/2018    Admit Diagnosis: Cardiac pacemaker battery worn out [Z45.010]      Subjective:   Patient reports that he feels better but is having hypoxemia and hypotension. Objective:     Vitals:    01/29/18 2310 01/29/18 2340 01/30/18 0353 01/30/18 0529   BP: 97/54 95/52 100/56    Pulse: 81 82 80    Resp: 18  18    Temp: 98 °F (36.7 °C)  97.7 °F (36.5 °C)    SpO2: 93%  92%    Weight:    192 lb 12.8 oz (87.5 kg)   Height:    5' 8\" (1.727 m)       Physical Exam:  Jeanine Donning, Well Nourished, No Acute Distress, Alert & Oriented x 3, appropriate mood. Neck- supple, no JVD  CV- regular rate and rhythm no MRG  Lung- clear bilaterally  Abd- soft, nontender, nondistended  Ext- no edema bilaterally.   Skin- warm and dry    Current Facility-Administered Medications   Medication Dose Route Frequency    cefTRIAXone (ROCEPHIN) 1 g in sterile water (preservative free) 10 mL IV syringe  1 g IntraVENous Q24H    latanoprost (XALATAN) 0.005 % ophthalmic solution 1 Drop (Patient Supplied)  1 Drop Both Eyes QPM    brinzolamide (AZOPT) 1 % ophthalmic suspension 1 Drop (Patient Supplied)  1 Drop Both Eyes BID    midodrine (PROAMITINE) tablet 2.5 mg  2.5 mg Oral TID WITH MEALS    0.9% sodium chloride infusion  50 mL/hr IntraVENous CONTINUOUS    amiodarone (CORDARONE) tablet 200 mg  200 mg Oral DAILY    aspirin chewable tablet 81 mg  81 mg Oral 7am    atorvastatin (LIPITOR) tablet 40 mg  40 mg Oral QHS    levothyroxine (SYNTHROID) tablet 25 mcg  25 mcg Oral ACB    sodium chloride (NS) flush 5-10 mL  5-10 mL IntraVENous Q8H    sodium chloride (NS) flush 5-10 mL  5-10 mL IntraVENous PRN    oxyCODONE-acetaminophen (PERCOCET) 5-325 mg per tablet 1 Tab  1 Tab Oral Q4H PRN     Data Review:   Recent Results (from the past 24 hour(s))   METABOLIC PANEL, BASIC    Collection Time: 01/30/18  3:38 AM   Result Value Ref Range Sodium 137 136 - 145 mmol/L    Potassium 3.9 3.5 - 5.1 mmol/L    Chloride 102 98 - 107 mmol/L    CO2 22 21 - 32 mmol/L    Anion gap 13 7 - 16 mmol/L    Glucose 157 (H) 65 - 100 mg/dL    BUN 52 (H) 8 - 23 MG/DL    Creatinine 2.67 (H) 0.8 - 1.5 MG/DL    GFR est AA 30 (L) >60 ml/min/1.73m2    GFR est non-AA 24 (L) >60 ml/min/1.73m2    Calcium 8.2 (L) 8.3 - 10.4 MG/DL     Assessment:     Principal Problem:    Cardiac arrest (HCC) (1/25/2018)    Active Problems:    Paroxysmal ventricular tachycardia (HCC) (9/16/2015)      Chronic systolic heart failure ([facility]) (9/16/2015)      Essential hypertension, benign (9/16/2015)      Hyperlipidemia (7/19/2016)      Ventricular fibrillation ([facility] (1/25/2018)      Acute renal failure superimposed on stage 3 chronic kidney disease ([facility] (1/29/2018)      Plan:   1. Cardiac Arrest - [place] to be secondary to end stage cardiomyopathy and resp arrest from sedation. Received CPR during surgery. Is now stabilzed  2. End Stage Chronic systolic heart failure - Wife reports that his cardiac health has been doing poorly over the past few months. \  3. CAD - Severe CAD that is not able to be fully revascularized. Medical therapy only. 4. PULMONARY HTN - Severe when measured in 2015 >66mmHg  5. VENTRICULAR TACHYCARDIA -  Chronic Amio 200mg po qam  6. CHRONIC SYSTOLIC HEART FAILURE  - iv lasix today. NO ACEI/ARB/Beta Blocker secondary to hypotension and renal failure  7. Biv/ICD - Device was implanted by and outside EP group. Has poor Atrial and LV lead thresholds. His age and other issues make replacement of leads high risk. Post Gen Change with a [facility] Biv/ICD. Had cardiac arrest during surgery, pocket was closed rapidly. Will treat with Vancomycin and Ancef for 72 hrs then Keflex for 14 days  8. Hypoxemia - iv lasix this am. Stop fluids    Janus Block. Jhonatan Marie M.D., F.A.C.C, F.H.R.S.   Cardiology/Electrophysiology

## 2018-01-30 NOTE — WOUND CARE
Patient seen for left elbow skin tear/scrape. Area pink, shallow and very little serosanguinous drainage. Changed dressing and added xeroform gauze to wound base and covered with silicone foam. Primary nurse aware. Wife and patient updated and in agreement. Re consult wound care team if needed.

## 2018-01-30 NOTE — PROGRESS NOTES
Problem: Falls - Risk of  Goal: *Absence of Falls  Document Leila Fall Risk and appropriate interventions in the flowsheet.    Fall Risk Interventions:  Mobility Interventions: PT Consult for mobility concerns, Patient to call before getting OOB    Mentation Interventions: Bed/chair exit alarm, Room close to nurse's station, More frequent rounding    Medication Interventions: Teach patient to arise slowly, Patient to call before getting OOB, Evaluate medications/consider consulting pharmacy    Elimination Interventions: Call light in reach, Patient to call for help with toileting needs, Urinal in reach

## 2018-01-30 NOTE — PROGRESS NOTES
Problem: Falls - Risk of  Goal: *Absence of Falls  Document Leila Fall Risk and appropriate interventions in the flowsheet.    Outcome: Progressing Towards Goal  Fall Risk Interventions:  Mobility Interventions: Patient to call before getting OOB    Mentation Interventions: Eyeglasses and hearing aids    Medication Interventions: Patient to call before getting OOB, Teach patient to arise slowly    Elimination Interventions: Call light in reach, Patient to call for help with toileting needs, Toilet paper/wipes in reach, Urinal in reach

## 2018-01-30 NOTE — PROGRESS NOTES
[facility]        Subjective: A on CKD No SOB or CP  Having difficulty voiding without the ramsey. Review of Systems -   General ROS: negative for - chills, fatigue or fever  Respiratory ROS: no cough, shortness of breath, or wheezing  Cardiovascular ROS: no chest pain or dyspnea on exertion  Gastrointestinal ROS: no abdominal pain, change in bowel habits, or black or bloody stools  Genito-Urinary ROS: no dysuria, trouble voiding, or hematuria  Neurological ROS: no TIA or stroke symptoms        Objective:    Vitals:    01/30/18 0353 01/30/18 0529 01/30/18 0654 01/30/18 1054   BP: 100/56  98/51 103/56   Pulse: 80  81 80   Resp: 18 18 18   Temp: 97.7 °F (36.5 °C)  98.2 °F (36.8 °C) 97.3 °F (36.3 °C)   SpO2: 92%  98% 93%   Weight:  87.5 kg (192 lb 12.8 oz)     Height:  5' 8\" (1.727 m)         PE  Gen: in no acute distress  CV:reg rate  Chest: clear  Abd: soft  Ext/Access: no edema       . LAB  No results for input(s): WBC, HGB, HCT, PLT, INR, HGBEXT, HCTEXT, PLTEXT in the last 72 hours.     No lab exists for component: INREXT  Recent Labs      01/30/18   0338  01/29/18   0418  01/28/18   0428   NA  137  139  137   K  3.9  4.0  3.9   CL  102  104  106   CO2  22  22  22   GLU  157*  139*  125*   BUN  52*  53*  53*   CREA  2.67*  2.72*  2.89*   CA  8.2*  8.5  8.2*           Radiology    A/P:   Patient Active Problem List   Diagnosis Code    Other chronic pulmonary heart diseases I27.89    Paroxysmal ventricular tachycardia (HCC) I47.2    Chronic systolic heart failure (HCC) I50.22    Occlusion and stenosis of carotid artery without mention of cerebral infarction I65.29    Automatic implantable cardiac defibrillator in situ Z95.810    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    Peripheral vascular disease, unspecified I73.9    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    Pain in limb M79.609    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 Will see if he can void on his own.       Jez Olsen MD

## 2018-01-30 NOTE — PROGRESS NOTES
Rocephin still not available. Pharmacy notified. Gage Talamantes in pharmacy states they are attempting to obtain for patient.

## 2018-01-30 NOTE — PROGRESS NOTES
Pt's O2 on RA 86%, pt asymptomatic, HOB elevated, encouraged to deep breathe, pt O2 increase to 87%. 2LNC applied, O2 increased to 97%.

## 2018-01-30 NOTE — PROGRESS NOTES
Bedside shift report given to Vikash Meek (oncoming nurse) by Fonnie Phalen, RN (offgoing nurse). Bedside shift report included the following information: SBAR, Kardex, ED Summary, MAR, and Recent Results.

## 2018-01-30 NOTE — DISCHARGE INSTRUCTIONS
Learning About a Pacemaker for Heart Failure  What is a pacemaker for heart failure? A pacemaker is a small device that is placed under the skin of your chest. It is powered by batteries. It has thin wires, called leads, that pass through a vein into your heart. A pacemaker for heart failure is a biventricular pacemaker (say \"denise-karon-TRICK-angelica-naida\"). Treatment that uses this type of pacemaker is called cardiac resynchronization therapy (CRT). When you have heart failure, the lower chambers of your heart may not pump at the same time. The pacemaker sends painless electrical signals to your heart. These signals make the chambers pump at the same time. This can help your heart pump blood better and help you feel better. Your pacemaker may be combined with an ICD, or implantable cardioverter-defibrillator. It can control abnormal heart rhythms. This can prevent sudden death. You may feel worried about having a pacemaker. This is common. It can help if you learn about how the pacemaker helps your heart. Talk to your doctor about your concerns. How is a pacemaker put in place? You will get medicine before the procedure. This helps you relax and helps prevent pain. The doctor makes a cut in the skin just below your collarbone. The cut may be on either side of your chest. The doctor will put the pacemaker leads through the cut. The leads go into a large blood vessel in the upper chest. Then the doctor will guide the leads through the blood vessel into different chambers of the heart. The doctor will place the pacemaker under the skin of your chest. He or she will attach the leads to the pacemaker. Then the cut will be closed with stitches. The procedure usually takes 2 to 3 hours. You may need to spend the night in the hospital.  What can you expect when you have a pacemaker? A pacemaker can help your heart pump blood better. It may help you feel better so you can be more active.  It also may help keep you out of the hospital and help you live longer. You can live a normal, active life with a pacemaker. But you need to avoid strong magnetic and electrical fields. Your doctor or the maker of your pacemaker can give you a full list of things to avoid. But most everyday appliances are safe. Your doctor will check your pacemaker regularly to make sure it's working right. Pacemaker batteries usually last 5 to 15 years before they need to be replaced. Follow-up care is a key part of your treatment and safety. Be sure to make and go to all appointments, and call your doctor if you are having problems. It's also a good idea to know your test results and keep a list of the medicines you take. Where can you learn more? Go to http://jessie-anne.info/. Enter Y169 in the search box to learn more about \"Learning About a Pacemaker for Heart Failure. \"  Current as of: September 21, 2016       Pacemaker Placement: What to Expect at [address] [place] [address]    Pacemaker placement is surgery to put a pacemaker in your chest. This surgery may be done if you have bradycardia (a slow heart rate). A pacemaker is a small, battery-powered device. It sends electrical signals to the heart. These signals work to keep the heartbeat steady. Thin wires, called leads, carry the signals from the pacemaker to the heart. A pacemaker can prevent or reduce dizziness, fainting, and shortness of breath caused by a slow or unsteady heartbeat. Your chest may be sore where the doctor made the cut (incision) and put in the pacemaker. You also may have a bruise and mild swelling. These symptoms usually get better in 1 to 2 weeks. You may feel a hard ridge along the incision. This usually gets softer in the months after surgery. You may be able to see or feel the outline of the pacemaker under your skin. You will probably be able to go back to work or your usual routine 1 to 2 weeks after surgery.   Pacemaker batteries usually last 5 to 15 years. Your doctor will talk to you about how often you will need to have your pacemaker checked. You can safely use most household and office electronics. This includes things such as kitchen appliances, electric power tools, and computers. You will need to stay away from things with strong magnetic and electrical fields. This includes things such as an MRI machine (unless your pacemaker is safe for an MRI), welding equipment, and power generators. You can use a cell phone, but keep it at least 6 inches away from your pacemaker. Check with your doctor about what you need to stay away from, what you need to use with care, and what is okay to use. This care sheet gives you a general idea about how long it will take for you to recover. But each person recovers at a different pace. Follow the steps below to get better as quickly as possible. How can you care for yourself at home? Activity  ? · Rest when you feel tired. ? · Be active. Walking is a good choice. ? · For 4 to 6 weeks:  ¨ Avoid activities that strain your chest or upper arm muscles. This includes pushing a  or vacuum, or mopping floors. It also includes swimming, or swinging a golf club or tennis racquet. ¨ Do not raise your arm (the one on the side of your body where the pacemaker is located) above your shoulder. ¨ Allow your body to heal. Don't move quickly or lift anything heavy until you are feeling better. ? · Many people are able to return to work within 1 to 2 weeks after surgery. ? · Ask your doctor when it is okay for you to have sex. Diet  ? · You can eat your normal diet. If your stomach is upset, try bland, low-fat foods like plain rice, broiled chicken, toast, and yogurt. Medicines  ? · Your doctor will tell you if and when you can restart your medicines. He or she will also give you instructions about taking any new medicines. ? · If you take aspirin or some other blood thinner, be sure to talk to your doctor.  He or she will tell you if and when to start taking this medicine again. Make sure that you understand exactly what your doctor wants you to do. ? · Be safe with medicines. Read and follow all instructions on the label. ¨ If the doctor gave you a prescription medicine for pain, take it as prescribed. ¨ If you are not taking a prescription pain medicine, ask your doctor if you can take an over-the-counter medicine. ¨ Do not take aspirin, ibuprofen (Advil, Motrin), naproxen (Aleve), or other nonsteroidal anti-inflammatory drugs (NSAIDs) unless your doctor says it is okay. ? · If your doctor prescribed antibiotics, take them as directed. Do not stop taking them just because you feel better. You need to take the full course of antibiotics. Incision care  ? · If you have strips of tape on the incision, leave the tape on for a week or until it falls off.   ? · Keep the incision dry while it heals. Your doctor may recommend sponge baths for about 7 days, but do not get the incision wet. Your doctor will let you know when you may take showers. After a shower, pat the incision dry. ? · Don't use hydrogen peroxide or alcohol on the incision, which can slow healing. You may cover the area with a gauze bandage if it oozes fluid or rubs against clothing. Change the bandage every day. ? · Do not take a bath or get into a hot tub for the first 2 weeks, or until your doctor tells you it is okay. Other instructions  ? · Keep a medical ID card with you at all times that says you have a pacemaker. The card should include the  and model information. ? · Wear medical alert jewelry stating that you have a pacemaker. You can buy this at most [employer]. ? · Check your pulse as directed by your doctor. ? · Have your pacemaker checked as often as your doctor recommends. In some cases, this may be done over the phone or the Internet. Your doctor will give you instructions about how to do this.    Follow-up care is a key part of your treatment and safety. Be sure to make and go to all appointments, and call your doctor if you are having problems. It's also a good idea to know your test results and keep a list of the medicines you take. When should you call for help? Call 911 anytime you think you may need emergency care. For example, call if:  ? · You passed out (lost consciousness). ? · You have trouble breathing. ?Call your doctor now or seek immediate medical care if:  ? · You are dizzy or light-headed, or you feel like you may faint. ? · You have pain that does not get better after you take pain medicine. ? · You hear an alarm or feel a vibration from your pacemaker. ? · You have loose stitches, or your incision comes open. ? · Bright red blood has soaked through the bandage over your incision. ? · You have signs of infection, such as:  ¨ Increased pain, swelling, warmth, or redness. ¨ Red streaks leading from the incision. ¨ Pus draining from the incision. ¨ A fever. ? Watch closely for changes in your health, and be sure to contact your doctor if:  ? · You have any problems with your pacemaker. Where can you learn more? Go to http://jessie-anne.info/. Enter G550 in the search box to learn more about \"Pacemaker Placement: What to Expect at Home. \"  Current as of: September 21, 2016  Content Version: 11.4  © 6643-3150 [employer]. Care instructions adapted under license by [employer] (which disclaims liability or warranty for this information). If you have questions about a medical condition or this instruction, always ask your healthcare professional. [place] [address] any warranty or liability for your use of this information. Chronic Kidney Disease: Care Instructions  Your Care Instructions    Chronic kidney disease happens when your kidneys don't work as well as they should. Your kidneys have a few important jobs.  They remove waste from your blood. This waste leaves your body in your urine. They also balance your body's fluids and chemicals. When your kidneys don't work well, extra waste and fluid can build up. This can poison the body and sometimes cause death. The most common causes of this disease are diabetes and high blood pressure. In some cases, the disease develops in 2 to 3 months. But it usually develops over many years. If you take medicine and make healthy changes to your lifestyle, you may be able to prevent the disease from getting worse. But if your kidney damage gets worse, you may need dialysis or a kidney transplant. Dialysis uses a machine to filter waste from the blood. A transplant is surgery to give you a healthy kidney from another person. Follow-up care is a key part of your treatment and safety. Be sure to make and go to all appointments, and call your doctor if you are having problems. It's also a good idea to know your test results and keep a list of the medicines you take. How can you care for yourself at home? ? Treatments and appointments  ? · Be safe with medicines. Take your medicines exactly as prescribed. Call your doctor if you have any problems with your medicine. You also may take medicine to control your blood pressure or to treat diabetes. Many people who have diabetes take blood pressure medicine. ? · If you have diabetes, do your best to keep your blood sugar in your target range. You may do this by eating healthy food and exercising. You may also take medicines. ? · Go to your dialysis appointments if you have this treatment. ? · Do not take ibuprofen (Advil, Motrin), naproxen (Aleve), or similar medicines, unless your doctor tells you to. These may make the disease worse. ? · Do not take any vitamins, over-the-counter medicines, or herbal products without talking to your doctor first.   ? · Do not smoke or use other tobacco products.  Smoking can reduce blood flow to the kidneys. If you need help quitting, talk to your doctor about stop-smoking programs and medicines. These can increase your chances of quitting for good. ? · Do not drink alcohol or use illegal drugs. ? · Talk to your doctor about an exercise plan. Exercise helps lower your blood pressure. It also makes you feel better. ? · If you have an advance directive, let your doctor know. It may include a living will and a durable power of  for health care. If you don't have one, you may want to prepare one. It lets your doctor and loved ones know your health care wishes if you become unable to speak for yourself. Diet  ? · Talk to a registered dietitian. He or she can help you make a meal plan that is right for you. Most people with kidney disease need to limit salt (sodium), fluids, and protein. Some also have to limit potassium and phosphorus. ? · You may have to give up many foods you like. But try to focus on the fact that this will help you stay healthy for as long as possible. ? · If you have a hard time eating enough, talk to your doctor or dietitian about ways to add calories to your diet. ? · Your diet may change as your disease changes. See your doctor for regular testing. And work with a dietitian to change your diet as needed. When should you call for help? Call 911 anytime you think you may need emergency care. For example, call if:  ? · You passed out (lost consciousness). ?Call your doctor now or seek immediate medical care if:  ? · You have less urine than normal or no urine. ? · You have trouble urinating or can urinate only very small amounts. ? · You are confused or have trouble thinking clearly. ? · You feel weaker or more tired than usual.   ? · You are very thirsty, lightheaded, or dizzy. ? · You have nausea and vomiting. ? · You have new swelling of your arms or feet, or your swelling is worse. ? · You have blood in your urine.    ? · You have new or worse trouble breathing. ? Watch closely for changes in your health, and be sure to contact your doctor if:  ? · You have any problems with your medicine or other treatment. Where can you learn more? Go to http://jessie-anne.info/. Enter N276 in the search box to learn more about \"Chronic Kidney Disease: Care Instructions. \"  Current as of: May 12, 2017  Content Version: 11.4  © 9602-1798 [employer]. Care instructions adapted under license by [employer] (which disclaims liability or warranty for this information). If you have questions about a medical condition or this instruction, always ask your healthcare professional. [place] [address] any warranty or liability for your use of this information. Pacemaker Placement for Heart Failure: What to Expect at [address] pacemaker for heart failure is a biventricular pacemaker (say \"denise-karon-TRICK-angelica-naida\"). Treatment that uses this type of pacemaker is called cardiac resynchronization therapy (CRT). When you have heart failure, the lower chambers of your heart may not pump at the same time. The pacemaker sends painless electrical signals to your heart. These signals make the chambers pump at the same time. This can help your heart pump blood better and help you feel better. Your pacemaker may be combined with an ICD, or implantable cardioverter-defibrillator. It can control abnormal heart rhythms. This can prevent sudden death. Your chest may be sore where the doctor made the cut (incision) and put in the pacemaker. You also may have a bruise and mild swelling. These symptoms usually get better in 1 to 2 weeks. You may feel a hard ridge along the incision. This usually gets softer in the months after surgery. You may be able to see or feel the outline of the pacemaker under your skin. You will probably be able to go back to work or your usual routine 1 to 2 weeks after surgery.   Pacemaker batteries usually last 5 to 15 years. Your doctor will talk to you about how often you will need to have your pacemaker checked. You can safely use most household and office electronics. This includes things such as kitchen appliances, electric power tools, and computers. You will need to stay away from things with strong magnetic and electrical fields. This includes things such as an MRI machine (unless your pacemaker is safe for an MRI), welding equipment, and power generators. You can use a cell phone, but keep it at least 6 inches away from your pacemaker. Check with your doctor about what you need to stay away from, what you need to use with care, and what is okay to use. This care sheet gives you a general idea about how long it will take for you to recover. But each person recovers at a different pace. Follow the steps below to get better as quickly as possible. How can you care for yourself at home? Activity  ? · Rest when you feel tired. ? · Be active. Walking is a good choice. ? · For 4 to 6 weeks:  ¨ Avoid activities that strain your chest or upper arm muscles. This includes pushing a  or vacuum, mopping floors, swimming, or swinging a golf club or tennis racquet. ¨ Do not raise your arm (the one on the side of your body where the pacemaker is located) above your shoulder. ¨ Allow your body to heal. Don't move quickly or lift anything heavy until you are feeling better. ? · Many people are able to return to work within 1 to 2 weeks after surgery. ? · Ask your doctor when it is okay for you to have sex. Diet  ? · You can eat your normal diet. If your stomach is upset, try bland, low-fat foods like plain rice, broiled chicken, toast, and yogurt. Medicines  ? · Your doctor will tell you if and when you can restart your medicines. He or she will also give you instructions about taking any new medicines. ? · If you take aspirin or some other blood thinner, be sure to talk to your doctor.  He or she will tell you if and when to start taking this medicine again. Make sure that you understand exactly what your doctor wants you to do. ? · Be safe with medicines. Read and follow all instructions on the label. ¨ If the doctor gave you a prescription medicine for pain, take it as prescribed. ¨ If you are not taking a prescription pain medicine, ask your doctor if you can take an over-the-counter medicine. ¨ Do not take aspirin, ibuprofen (Advil, Motrin), naproxen (Aleve), or other nonsteroidal anti-inflammatory drugs (NSAIDs) unless your doctor says it is okay. ? · If your doctor prescribed antibiotics, take them as directed. Do not stop taking them just because you feel better. You need to take the full course of antibiotics. Incision care  ? · If you have strips of tape on the incision, leave the tape on for a week or until it falls off.   ? · Keep the incision dry while it heals. Your doctor may recommend sponge baths for about 7 days, but do not get the incision wet. Your doctor will let you know when you may take showers. After a shower, pat the incision dry. ? · Don't use hydrogen peroxide or alcohol on the incision, which can slow healing. You may cover the area with a gauze bandage if it oozes fluid or rubs against clothing. Change the bandage every day. ? · Do not take a bath or get into a hot tub for the first 2 weeks, or until your doctor tells you it is okay. Other instructions  ? · Keep a medical ID card with you at all times that says you have a pacemaker. The card should include the  and model information. ? · Wear medical alert jewelry stating that you have a pacemaker. You can buy this at most [employer]. ? · Check your pulse as directed by your doctor. ? · Have your pacemaker checked as often as your doctor recommends. In some cases, this may be done over the phone or the Internet. Your doctor will give you instructions about how to do this.    Follow-up care is a key part of your treatment and safety. Be sure to make and go to all appointments, and call your doctor if you are having problems. It's also a good idea to know your test results and keep a list of the medicines you take. When should you call for help? Call 911 anytime you think you may need emergency care. For example, call if:  ? · You passed out (lost consciousness). ? · You have trouble breathing. ?Call your doctor now or seek immediate medical care if:  ? · You are dizzy or light-headed, or you feel like you may faint. ? · You have pain that does not get better after you take pain medicine. ? · You hear an alarm or feel a vibration from your pacemaker. ? · You have loose stitches, or your incision comes open. ? · Bright red blood has soaked through the bandage over your incision. ? · You have signs of infection, such as:  ¨ Increased pain, swelling, warmth, or redness. ¨ Red streaks leading from the incision. ¨ Pus draining from the incision. ¨ A fever. ? Watch closely for changes in your health, and be sure to contact your doctor if:  ? · You have any problems with your pacemaker. Where can you learn more? Go to http://jessie-anne.info/. Enter T984 in the search box to learn more about \"Pacemaker Placement for Heart Failure: What to Expect at Home. \"  Current as of: September 21, 2016  Content Version: 11.4  © 8600-9947 [employer]. Care instructions adapted under license by [employer] (which disclaims liability or warranty for this information). If you have questions about a medical condition or this instruction, always ask your healthcare professional. [place] [address] any warranty or liability for your use of this information. A Healthy Heart: Care Instructions  Your Care Instructions    Heart disease occurs when a substance called plaque builds up in the vessels that supply oxygen-rich blood to your heart. This can narrow the blood vessels and reduce blood flow. A heart attack happens when blood flow is completely blocked. A high-fat diet, smoking, and other factors increase the risk of heart disease. Your doctor has found that you have a chance of having heart disease. You can do lots of things to keep your heart healthy. It may not be easy, but you can change your diet, exercise more, and quit smoking. These steps really work to lower your chance of heart disease. Follow-up care is a key part of your treatment and safety. Be sure to make and go to all appointments, and call your doctor if you are having problems. It's also a good idea to know your test results and keep a list of the medicines you take. How can you care for yourself at home? Diet  ? · Use less salt when you cook and eat. This helps lower your blood pressure. Taste food before salting. Add only a little salt when you think you need it. With time, your taste buds will adjust to less salt. ? · Eat fewer snack items, fast foods, canned soups, and other high-salt, high-fat, processed foods. ? · Read food labels and try to avoid saturated and trans fats. They increase your risk of heart disease by raising cholesterol levels. ? · Limit the amount of solid fat-butter, margarine, and shortening-you eat. Use olive, peanut, or canola oil when you cook. Bake, broil, and steam foods instead of frying them. ? · Eating fish can lower your risk for heart disease. Eat at least 2 servings of fish a week. [place], mackerel, herring, sardines, and chunk light tuna are very good choices. These fish contain omega-3 fatty acids. ? · Eat a variety of fruit and vegetables every day. Dark green, deep orange, red, or yellow fruits and vegetables are especially good for you. Examples include spinach, carrots, peaches, and berries. ? · Foods high in fiber can reduce your cholesterol and provide important vitamins and minerals.  High-fiber foods include whole-grain cereals and breads, oatmeal, beans, brown rice, citrus fruits, and apples. ? · Limit drinks and foods with added sugar. These include candy, desserts, and soda pop. ? Lifestyle changes  ? · If your doctor recommends it, get more exercise. Walking is a good choice. Bit by bit, increase the amount you walk every day. Try for at least 30 minutes on most days of the week. You also may want to swim, bike, or do other activities. ? · Do not smoke. If you need help quitting, talk to your doctor about stop-smoking programs and medicines. These can increase your chances of quitting for good. Quitting smoking may be the most important step you can take to protect your heart. It is never too late to quit. You will get health benefits right away. ? · Limit alcohol to 2 drinks a day for men and 1 drink a day for women. Too much alcohol can cause health problems. Medicines  ? · Take your medicines exactly as prescribed. Call your doctor if you think you are having a problem with your medicine. ? · If your doctor recommends aspirin, take the amount directed each day. Make sure you take aspirin and not another kind of pain reliever, such as acetaminophen (Tylenol). If you take ibuprofen (such as Advil or Motrin) for other problems, take aspirin at least 2 hours before taking ibuprofen. When should you call for help? Call 911 if you have symptoms of a heart attack. These may include:  ? · Chest pain or pressure, or a strange feeling in the chest.   ? · Sweating. ? · Shortness of breath. ? · Pain, pressure, or a strange feeling in the back, neck, jaw, or upper belly or in one or both shoulders or arms. ? · Lightheadedness or sudden weakness. ? · A fast or irregular heartbeat. ? After you call 911, the  may tell you to chew 1 adult-strength or 2 to 4 low-dose aspirin. Wait for an ambulance. Do not try to drive yourself. ? Watch closely for changes in your health, and be sure to contact your doctor if you have any problems. Where can you learn more? Go to http://jessie-anne.info/. Enter Z653 in the search box to learn more about \"A Healthy Heart: Care Instructions. \"  Current as of: September 21, 2016  Content Version: 11.4  © 1142-4113 Healthwise, [employer]. Care instructions adapted under license by [employer] (which disclaims liability or warranty for this information). If you have questions about a medical condition or this instruction, always ask your healthcare professional. [address] any warranty or liability for your use of this information.

## 2018-01-30 NOTE — PROGRESS NOTES
Paged Dr. Karlene Helton regarding discharge medication. States patient is not to continue midodrine on discharge.

## 2018-01-30 NOTE — PROGRESS NOTES
Problem: Self Care Deficits Care Plan (Adult)  Goal: *Acute Goals and Plan of Care (Insert Text)  1. Patient will complete lower body bathing and dressing with supervision and adaptive equipment as needed. 2. Patient will complete toileting with supervision. 3. Patient will tolerate 23 minutes of OT treatment with 2-3 rest breaks to increase activity tolerance for ADLs. 4. Patient will complete functional transfers with supervision and adaptive equipment as needed. Timeframe: 7 visits       OCCUPATIONAL THERAPY: Daily Note, Treatment Day: 1st and AM 1/30/2018  INPATIENT: Hospital Day: 6  Payor: Carlos Manuel Saldana / Plan: [address] [place] [address] HMO / Product Type: [email or web address] Medicare /      NAME/AGE/GENDER: Myra Celis is a 80 y.o. male   PRIMARY DIAGNOSIS:  Cardiac pacemaker battery worn out [Z45.010] Cardiac arrest ([address] Cardiac arrest ([facility]  Procedure(s) (LRB):  BIV ICD GEN CHANGE (N/A)  5 Days Post-Op  ICD-10: Treatment Diagnosis:    · Generalized Muscle Weakness (M62.81)  · Other lack of cordination (R27.8)   Precautions/Allergies:     Review of patient's allergies indicates no known allergies. ASSESSMENT:   Mr. Crystal Avila was admitted for the above diagnosis. Pt presents working with PT. Pt was assisted with ADLs at this time (in grid below). Pt did well with bathing. Required more assistance with dressing due to decreased activity tolerance. Pt returned to chair and left with all needs in reach. Will continue to benefit from skilled OT during stay. This section established at most recent assessment   PROBLEM LIST (Impairments causing functional limitations):  1. Decreased Strength  2. Decreased ADL/Functional Activities  3. Decreased Transfer Abilities  4. Decreased Ambulation Ability/Technique  5. Decreased Balance  6. Increased Pain  7. Decreased Activity Tolerance  8. Decreased Pacing Skills  9. Decreased Work Simplification/Energy Conservation Techniques  10. Increased Fatigue  11.  Decreased Flexibility/Joint Mobility  12. Edema/Girth  13. Decreased Skin Integrity/Hygeine  14. Decreased [place] with Home Exercise Program   INTERVENTIONS PLANNED: (Benefits and precautions of occupational therapy have been discussed with the patient.)  1. Activities of daily living training  2. Adaptive equipment training  3. Balance training  4. Clothing management  5. Group therapy  6. Neuromuscular re-eduation  7. Therapeutic activity  8. Therapeutic exercise     TREATMENT PLAN: Frequency/Duration: Follow patient 3 times per week to address above goals. Rehabilitation Potential For Stated Goals: Good     RECOMMENDED REHABILITATION/EQUIPMENT: (at time of discharge pending progress): Due to the probability of continued deficits (see above) this patient will likely need continued skilled occupational therapy after discharge. Equipment:    TBD              OCCUPATIONAL PROFILE AND HISTORY:   History of Present Injury/Illness (Reason for Referral):  See H&P  Past Medical History/Comorbidities:   Mr. Miguel Barry  has a past medical history of Arrhythmia (2004); Atherosclerosis of native arteries of the extremities with intermittent claudication (9/16/2015); Automatic implantable cardiac defibrillator in situ (9/16/2015); CAD (coronary artery disease); Chronic systolic heart failure ([address] (9/16/2015); Common variable immunodeficiency ([address] (9/16/2015); Congestive heart failure, unspecified (9/16/2015); Diabetes ([address] (x 15 yrs); Diseases of tricuspid valve (9/16/2015); Essential hypertension, benign (9/16/2015); Heart failure ([address] Hypertension (x 1996); Occlusion and stenosis of carotid artery without mention of cerebral infarction (9/16/2015); Other chronic pulmonary heart diseases (9/16/2015); Other primary cardiomyopathies (9/16/2015); PAD (peripheral artery disease) ([address] Pain in limb (9/16/2015); Paroxysmal ventricular tachycardia ([address] (9/16/2015);  Peripheral vascular disease, unspecified (9/16/2015); and Type II or unspecified type diabetes mellitus without mention of complication, not stated as uncontrolled (9/16/2015). He also has no past medical history of Arthritis; Asthma; Autoimmune disease ([facility] Cancer ([facility] Chronic kidney disease; Chronic obstructive pulmonary disease ([facility] Difficult intubation; GERD (gastroesophageal reflux disease); Liver disease; Malignant hyperthermia due to anesthesia; Nausea & vomiting; Pseudocholinesterase deficiency; Psychiatric disorder; PUD (peptic ulcer disease); Seizures ([facility] Sleep apnea; Stroke [facility]); Thromboembolus ([facility] or Thyroid disease. Mr. Leigh Covington  has a past surgical history that includes hx pacemaker and hx hernia repair. Social History/Living Environment:   Home Environment: Private residence  # Steps to Enter: 7  Rails to Enter: Yes  Hand Rails : Bilateral  One/Two Story Residence: One story  Living Alone: No  Support Systems: Spouse/Significant Other/Partner  Patient Expects to be Discharged to[de-identified] Private residence  Current DME Used/Available at Home: Tristan Bread, straight, Garry Bouquet, rollator  Tub or Shower Type: Shower  Prior Level of Function/Work/Activity:  Pt lives at home with his wife. Pt states that typically he uses a cane for functional mobility. Pt reports no hx of falls. Pt was completing ADL with modified independence. Personal Factors: Other factors that influence how disability is experienced by the patient:  Multiple co-morbidities; recent cardiac arrest     Number of Personal Factors/Comorbidities that affect the Plan of Care: Extensive review of physical, cognitive, and psychosocial performance (3+):  HIGH COMPLEXITY   ASSESSMENT OF OCCUPATIONAL PERFORMANCE[de-identified]   Activities of Daily Living:           Basic ADLs (From Assessment) Complex ADLs (From Assessment)   Basic ADL  Feeding: Supervision  Oral Facial Hygiene/Grooming: Minimum assistance  Bathing: Moderate assistance  Upper Body Dressing: Minimum assistance  Lower Body Dressing:  Moderate assistance  Toileting: Moderate assistance Instrumental ADL  Meal Preparation: Maximum assistance  Homemaking: Total assistance   Grooming/Bathing/Dressing Activities of Daily Living   Grooming  Washing Face: Supervision/set-up Cognitive Retraining  Safety/Judgement: Awareness of environment   Upper Body Bathing  Bathing Assistance: Supervision/set-up  Position Performed: Seated in chair     Lower Body Bathing  Perineal  : Contact guard assistance  Position Performed: Standing  Lower Body : Minimum assistance; Moderate assistance  Position Performed: Seated in chair     Upper [address]: Supervision/ set-up Functional Transfers  Bathroom Mobility: Minimum assistance;Contact guard assistance  Shower Transfer: Contact guard assistance   Lower Body Dressing Assistance  Socks: Total assistance (dependent)  Antiembolitic Stockings: Total assistance (dependent) Bed/Mat Mobility  Sit to Stand: Contact guard assistance  Bed to Chair: Contact guard assistance (From chair to shower chair)        Most Recent Physical Functioning:   Gross Assessment:                  Posture:  Posture (WDL): Exceptions to WDL  Posture Assessment: Forward head, Rounded shoulders  Balance:  Sitting: Intact  Standing: Impaired  Standing - Static: Fair  Standing - Dynamic : Fair Bed Mobility:     Wheelchair Mobility:     Transfers:  Sit to Stand: Contact guard assistance  Stand to Sit: Contact guard assistance  Bed to Chair: Contact guard assistance (From chair to shower chair)              Patient Vitals for the past 6 hrs:   BP BP Patient Position SpO2 Pulse   01/30/18 1054 103/56 Sitting 93 % 80       Mental Status  Neurologic State: Alert, Appropriate for age  Orientation Level: Oriented X4  Cognition: Follows commands  Perception: Appears intact  Perseveration: No perseveration noted  Safety/Judgement: Awareness of environment                          Physical Skills Involved:  1. Balance  2. Strength  3.  Activity Tolerance  4. Pain (acute)  5. Edema Cognitive Skills Affected (resulting in the inability to perform in a timely and safe manner):  1. [facility] Psychosocial Skills Affected:  1. Habits/Routines   Number of elements that affect the Plan of Care: 5+:  HIGH COMPLEXITY   CLINICAL DECISION MAKIN[address] [place] [address] AM-PAC 6 Clicks   Daily Activity Inpatient Short Form  How much help from another person does the patient currently need. .. Total A Lot A Little None   1. Putting on and taking off regular lower body clothing? [] 1   [x] 2   [] 3   [] 4   2. Bathing (including washing, rinsing, drying)? [] 1   [x] 2   [] 3   [] 4   3. Toileting, which includes using toilet, bedpan or urinal?   [] 1   [x] 2   [] 3   [] 4   4. Putting on and taking off regular upper body clothing? [] 1   [] 2   [x] 3   [] 4   5. Taking care of personal grooming such as brushing teeth? [] 1   [] 2   [x] 3   [] 4   6. Eating meals? [] 1   [] 2   [x] 3   [] 4   © 2007, Trustees of [address] [place] [address], under license to [employer]. All rights reserved      Score:  Initial: 15 Most Recent: X (Date: -- )    Interpretation of Tool:  Represents activities that are increasingly more difficult (i.e. Bed mobility, Transfers, Gait). Score 24 23 22-20 19-15 14-10 9-7 6     Modifier CH CI CJ CK CL CM CN      ? Self Care:     - CURRENT STATUS: CK - 40%-59% impaired, limited or restricted    - GOAL STATUS: CJ - 20%-39% impaired, limited or restricted    - D/C STATUS:  ---------------To be determined---------------  Payor: Wendy  / Plan: [address] [place] [address] HMO / Product Type: Managed Care Medicare /      Medical Necessity:     · Patient demonstrates good rehab potential due to higher previous functional level. Reason for Services/Other Comments:  · Patient continues to require skilled intervention due to decreased independence and activity tolerance for ADL/functional moblity.    Use of outcome tool(s) and clinical judgement create a POC that gives a: MODERATE COMPLEXITY         TREATMENT:   (In addition to Assessment/Re-Assessment sessions the following treatments were rendered)     Pre-treatment Symptoms/Complaints:    Pain: Initial:   Pain Intensity 1: 0  Post Session:  0/10     Self Care: (20 minutes): Procedure(s) (per grid) utilized to improve and/or restore self-care/home management as related to dressing, bathing and grooming. Required moderate verbal, manual and tactile cueing to facilitate activities of daily living skills and compensatory activities. Therapeutic Activity: (10 minutes): Therapeutic activities including Chair transfers and Tub/shower transfers to improve mobility, strength, balance and activity tolerance. Required minimal Safety awareness training;Verbal cues to promote static and dynamic balance in standing. Braces/Orthotics/Lines/Etc:   · O2 Device: Room air  Treatment/Session Assessment:    · Response to Treatment:  Tolerated well  · Interdisciplinary Collaboration:   o Certified Occupational Therapy Assistant  o Registered Nurse  · After treatment position/precautions:   o Up in chair  o Bed/Chair-wheels locked  o Call light within reach  o Family at bedside  o Nurse at bedside   · Compliance with Program/Exercises: Will assess as treatment progresses. · Recommendations/Intent for next treatment session: \"Next visit will focus on advancements to more challenging activities and reduction in assistance provided\".   Total Treatment Duration:  OT Patient Time In/Time Out  Time In: 1020  Time Out: 606 [address] Vern Quick

## 2018-01-30 NOTE — PROGRESS NOTES
Patient voided 125 ml. Bladder scanned 249 ml in bladder according to scanner. Patient denies bladder discomfort.

## 2018-01-31 NOTE — PROGRESS NOTES
This note will not be viewable in [address] Transition of Care Discharge Follow-up Questionnaire     Date/Time of Call:     01/30/2018    9:58 am     What was the patient seen in the hospital for? Cardiac Arrest     Does the patient understand his/her diagnosis and/or treatment and what happened during the hospitalization? Patient voiced understanding of diagnosis and /or treatment. Did the patient receive discharge instructions? Yes   Review any discharge instructions (see notes in [facility]). Ask patient if they have any questions? Patient verbalized understanding of discharge instructions. Were home services ordered (nursing, PT, OT, ST, etc.)? No   If so, has the first visit occurred? If not, why? (Assist with coordination of services if necessary.)   n/a   Was any DME ordered? No   If so, has it been received? If not, why?  (Assist with coordination of arranging DME orders if necessary.) n/a   Complete a review of all medications (new, continued and discontinued meds per the D/C instructions and medication tab in [facility]). [facility] reviewed and confirmed as per instructions upon hospital discharge. Were all new prescriptions filled? If not, why?  (Assist with obtainment of medications if necessary.) Yes. cephALEXin (KEFLEX) 500 mg capsule Take 1 Cap by mouth three (3) times daily. Qty: 42 Cap, Refills: 0                     Does the patient understand the purpose and dosing instructions for all medications? (If patient has questions, provide explanation and education.)   Yes   Does the patient have any problems in performing ADLs? (If patient is unable to perform ADLs  what is the limiting factor(s)? Do they have a support system that can assist? If no support system is present, discuss possible assistance that they may be able to obtain.)       Patient reports being independent with ADLs. Does the patient have all follow-up appointments scheduled? 7 day f/up with PCP?    7-14 day f/up with specialist?    If f/up has not been made  what actions has the care coordinator made to accomplish this? Has transportation been arranged? [facility] follow-up should be within 7 days of discharge; all others should have PCP follow-up within 7 days of discharge; follow-ups with other specialists should be within 7-14 days of discharge.) Patient declines 3 way call with Care Coordinator to schedule hospital follow up appointment with PCP. This Care Coordinator also offered to obtain an appointment on behalf of the patient with a return call with appointment date and time. Patient in agreement. Call placed to [phone] by this Care Coordinator. Appointment scheduled by Kerline Figueroa for Tues., 2/13/2018 @ 2:15 pm.   Patient informed and instructed. CONFIRMED APPTS:  Jude Lopez MD (Cardiology) on 2/8/2018 @10:45AM / [place] office     Patient has transportation to/from appointments. Any other questions or concerns expressed by the patient? Opportunity to voice questions or concerns provided. No other needs identified at this time. Schedule next appointment with YEFRI ERAZO Coordinator or refer to RN Case Manager/  per the workflow guidelines. If referred for CCM  what RN care manager was the referral assigned? This Care Coordinator will schedule chart review and/or follow up calls.      YELENA Call Completed By:   Princess Trimble, [facility] Coordinator /  [facility] AND [facility]

## 2018-02-08 PROBLEM — E11.21 TYPE 2 DIABETES MELLITUS WITH NEPHROPATHY (HCC): Status: ACTIVE | Noted: 2018-01-01

## 2018-02-09 NOTE — PROGRESS NOTES
This note will not be viewable in [address] FOLLOW UP    Follow up call made. Patient reports following up with Cardiology as scheduled on 2/8. Patient voiced no complaints. Patient reminded of follow up appt scheduled with PCP on 2/13 @ 2:15pm  Patient voiced acknowledgement. No other needs identified. Will schedule final outreach call.

## 2018-02-15 NOTE — PROGRESS NOTES
This note will not be viewable in [address] Final outreach call for follow up. Patient reports feeling better and having followed up with PCP on 2/13 as scheduled. Patient reports being scheduled to follow up with Cardiology again later this month. No other needs identified. Patient expressed gratitude for call. Will close case.

## 2018-04-26 PROBLEM — L97.509 DIABETIC ULCER OF TOE (HCC): Status: ACTIVE | Noted: 2018-01-01

## 2018-04-26 PROBLEM — E11.621 DIABETIC ULCER OF TOE (HCC): Status: ACTIVE | Noted: 2018-01-01

## 2018-04-26 NOTE — IP AVS SNAPSHOT
303 [facility] 
 
 
 [address] 
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 
[phone] Your Scheduled Appointments Wednesday May 02, 2018  1:00 PM EDT  
[facility] PHONE ASSESSMENT with [facility] PHONE APPOINTMENT 614 [facility] Pre Assessment [facility] OR PRE ASSESSMENT) [address]  
[phone] Date/time displayed does not reflect when your phone assessment will be completed. Monday May 07, 2018 AMPUTATION TOE with Jing Wan MD  
[facility] SURGERY ([address] [place] [address]) 2329 [facility] 322 [facility]  
[phone] Monday May 14, 2018 10:50 AM EDT  
REMOTE DEVICE CHECK ORDERS ONLY ENCOUNTER with [facility] 62 [facility] CARDIOLOGY [place] OFFICE ([facility]) [address] 
[address] Evy Valenzuela 81  
[phone]  Please remember THIS REMOTE CHECK IS COMPLETED FROM HOME - YOU WILL NOT COME TO THE OFFICE FOR THIS APPOINTMENT. In preparation for this check, please ensure your monitor box is working appropriately. If your monitor requires you to send a transmission, please make sure it is sent by 11:00AM on this day so we can have it processed and resulted to your doctor without delay. If you have a question or problem with the monitor box, please contact your respective company:   [address] [place] [address]/[employer] - [phone]  [employer] - [phone]  [employer] - [phone]  [employer]/[facility] - [phone]  If you have any further questions or need to move this appointment, we are happy to help and can be reached at [phone]. Monday May 21, 2018  1:45 PM EDT Global Post Op with MANJINDER Chanel [facility] VASCULAR SURGERY ([facility]) [address] [id number] [phone] Discharge Orders None A check ivan indicates which time of day the medication should be taken. My Medications START taking these medications Instructions Each Dose to Equal  
 Morning Noon Evening Bedtime  
 cefTRIAXone 2 gram 2 g, ADDaptor 1 Device IVPB Start taking on:  5/2/2018  
   
 2 g by IntraVENous route every twenty-four (24) hours for 8 days. 2 g  
    
5/2/2018  
   
   
   
  
 metroNIDAZOLE 500 mg tablet Commonly known as:  FLAGYL Take 1 Tab by mouth every eight (8) hours for 9 days. 500 mg  
    
  
   
  
   
   
  
  
 vancomycin 1,000 mg 1,000 mg, ADDaptor 1 Device IVPB  
   
 1,000 mg by IntraVENous route every twenty-four (24) hours for 9 days. 1 g CONTINUE taking these medications Instructions Each Dose to Equal  
 Morning Noon Evening Bedtime  
 amiodarone 200 mg tablet Commonly known as:  CORDARONE Take 1 Tab by mouth daily.   
 200 mg  
    
  
   
   
   
  
 aspirin 81 mg chewable tablet Take 81 mg by mouth every morning. 81 mg  
    
  
   
   
   
  
 atorvastatin 40 mg tablet Commonly known as:  LIPITOR Take 1 Tab by mouth nightly. 40 mg  
    
   
   
   
  
  
 AZOPT 1 % ophthalmic suspension Generic drug:  brinzolamide Administer 1 Drop to both eyes two (2) times a day. 1 Drop Iron AspGly-VitC-B12-FA-Ca-Suc Tab capsule Commonly known as:  Coila Ao Take 1 Tab by mouth daily. 1 Tab  
    
  
   
   
   
  
 levothyroxine 25 mcg tablet Commonly known as:  SYNTHROID Take  by mouth Daily (before breakfast). XALATAN 0.005 % ophthalmic solution Generic drug:  latanoprost  
   
 Administer 1 Drop to both eyes nightly. 1 Drop STOP taking these medications   
 clopidogrel 75 mg Tab Commonly known as:  PLAVIX  
   
  
 furosemide 80 mg tablet Commonly known as:  LASIX Where to Get Your Medications Information on where to get these meds will be given to you by the nurse or doctor. ! Ask your nurse or doctor about these medications  
  cefTRIAXone 2 gram 2 g, ADDaptor 1 Device IVPB  
 metroNIDAZOLE 500 mg tablet  
 vancomycin 1,000 mg 1,000 mg, ADDaptor 1 Device IVPB Discharge Instructions ID appt 05/11 Vascular surgery planned 05/07  
   
 
Activity:Regular, as tolerated Diet: DIET DIABETIC CONSISTENT CARB Regular Wound Care: daily dsg changes 
  
 
 
DISCHARGE SUMMARY from Nurse PATIENT INSTRUCTIONS: 
 
After general anesthesia or intravenous sedation, for 24 hours or while taking prescription Narcotics: · Limit your activities · Do not drive and operate hazardous machinery · Do not make important personal or business decisions · Do  not drink alcoholic beverages · If you have not urinated within 8 hours after discharge, please contact your surgeon on call. Report the following to your surgeon: 
· Excessive pain, swelling, redness or odor of or around the surgical area · Temperature over 100.5 · Nausea and vomiting lasting longer than 4 hours or if unable to take medications · Any signs of decreased circulation or nerve impairment to extremity: change in color, persistent  numbness, tingling, coldness or increase pain · Any questions What to do at Home: 
Recommended activity: Activity as tolerated, per MD instructions If you experience any of the following symptoms fever > 100.5, nausea, vomiting, pain, chest pain and/or shortness of breath please follow up with MD. 
 
*  Please give a list of your current medications to your Primary Care Provider. *  Please update this list whenever your medications are discontinued, doses are 
    changed, or new medications (including over-the-counter products) are added. *  Please carry medication information at all times in case of emergency situations. These are general instructions for a healthy lifestyle: No smoking/ No tobacco products/ Avoid exposure to second hand smoke Surgeon General's Warning:  Quitting smoking now greatly reduces serious risk to your health. Obesity, smoking, and sedentary lifestyle greatly increases your risk for illness A healthy diet, regular physical exercise & weight monitoring are important for maintaining a healthy lifestyle You may be retaining fluid if you have a history of heart failure or if you experience any of the following symptoms:  Weight gain of 3 pounds or more overnight or 5 pounds in a week, increased swelling in our hands or feet or shortness of breath while lying flat in bed. Please call your doctor as soon as you notice any of these symptoms; do not wait until your next office visit. Recognize signs and symptoms of STROKE: 
 
F-face looks uneven A-arms unable to move or move unevenly S-speech slurred or non-existent T-time-call 911 as soon as signs and symptoms begin-DO NOT go Back to bed or wait to see if you get better-TIME IS BRAIN. Warning Signs of HEART ATTACK Call 911 if you have these symptoms: 
? Chest discomfort. Most heart attacks involve discomfort in the center of the chest that lasts more than a few minutes, or that goes away and comes back. It can feel like uncomfortable pressure, squeezing, fullness, or pain. ? Discomfort in other areas of the upper body. Symptoms can include pain or discomfort in one or both arms, the back, neck, jaw, or stomach. ? Shortness of breath with or without chest discomfort. ? Other signs may include breaking out in a cold sweat, nausea, or lightheadedness. Don't wait more than five minutes to call [address]! Fast action can save your life. Calling 911 is almost always the fastest way to get lifesaving treatment. Emergency Medical Services staff can begin treatment when they arrive  up to an hour sooner than if someone gets to the hospital by car. The discharge information has been reviewed with the patient. The patient verbalized understanding. Discharge medications reviewed with the patient and appropriate educational materials and side effects teaching were provided. ___________________________________________________________________________________________________________________________________ Cellulitis: Care Instructions Your Care Instructions Cellulitis is a skin infection. It often occurs after a break in the skin from a scrape, cut, bite, or puncture, or after a rash. The doctor has checked you carefully, but problems can develop later. If you notice any problems or new symptoms, get medical treatment right away. Follow-up care is a key part of your treatment and safety.  Be sure to make and go to all appointments, and call your doctor if you are having problems. It's also a good idea to know your test results and keep a list of the medicines you take. How can you care for yourself at home? · Take your antibiotics as directed. Do not stop taking them just because you feel better. You need to take the full course of antibiotics. · Prop up the infected area on pillows to reduce pain and swelling. Try to keep the area above the level of your heart as often as you can. · If your doctor told you how to care for your wound, follow your doctor's instructions. If you did not get instructions, follow this general advice: ¨ Wash the wound with clean water 2 times a day. Don't use hydrogen peroxide or alcohol, which can slow healing. ¨ You may cover the wound with a thin layer of petroleum jelly, such as Vaseline, and a nonstick bandage. ¨ Apply more petroleum jelly and replace the bandage as needed. · Be safe with medicines. Take pain medicines exactly as directed. ¨ If the doctor gave you a prescription medicine for pain, take it as prescribed. ¨ If you are not taking a prescription pain medicine, ask your doctor if you can take an over-the-counter medicine. To prevent cellulitis in the future · Try to prevent cuts, scrapes, or other injuries to your skin. Cellulitis most often occurs where there is a break in the skin. · If you get a scrape, cut, mild burn, or bite, wash the wound with clean water as soon as you can to help avoid infection. Don't use hydrogen peroxide or alcohol, which can slow healing. · If you have swelling in your legs (edema), support stockings and good skin care may help prevent leg sores and cellulitis. · Take care of your feet, especially if you have diabetes or other conditions that increase the risk of infection. Wear shoes and socks. Do not go barefoot. If you have athlete's foot or other skin problems on your feet, talk to your doctor about how to treat them. When should you call for help? Call your doctor now or seek immediate medical care if: 
? · You have signs that your infection is getting worse, such as: 
¨ Increased pain, swelling, warmth, or redness. ¨ Red streaks leading from the area. ¨ Pus draining from the area. ¨ A fever. ? · You get a rash. ? Watch closely for changes in your health, and be sure to contact your doctor if: 
? · You are not getting better after 1 day (24 hours). ? · You do not get better as expected. Where can you learn more? Go to http://jessie-anne.info/. Gwenevere Danger in the search box to learn more about \"Cellulitis: Care Instructions. \" Current as of: October 13, 2016 Content Version: 11.4 © 5211-1297 [employer]. Care instructions adapted under license by [employer] (which disclaims liability or warranty for this information). If you have questions about a medical condition or this instruction, always ask your healthcare professional. [address] [address] any warranty or liability for your use of this information. [employer] Announcement We are excited to announce that we are making your provider's discharge notes available to you in [employer]. You will see these notes when they are completed and signed by the physician that discharged you from your recent hospital stay. If you have any questions or concerns about any information you see in [email or web address], please call the Health Information Department where you were seen or reach out to your Primary Care Provider for more information about your plan of care. Introducing [facility]! [employer] introduces [employer] patient portal. Now you can access parts of your medical record, email your doctor's office, and request medication refills online. 1. In your internet browser, go to [email or web address] 2. Click on the First Time User? Click Here link in the Sign In box.  You will see the New Member Sign Up page. 3. Enter your [employer] Access Code exactly as it appears below. You will not need to use this code after youve completed the sign-up process. If you do not sign up before the expiration date, you must request a new code. · [employer] Access Code: [email or web address] Expires: 7/26/2018  7:19 AM 
 
4. Enter the last four digits of your Social Security Number (xxxx) and Date of Birth (mm/dd/yyyy) as indicated and click Submit. You will be taken to the next sign-up page. 5. Create a [employer] ID. This will be your [employer] login ID and cannot be changed, so think of one that is secure and easy to remember. 6. Create a [employer] password. You can change your password at any time. 7. Enter your Password Reset Question and Answer. This can be used at a later time if you forget your password. 8. Enter your e-mail address. You will receive e-mail notification when new information is available in [facility] [address] 9. Click Sign Up. You can now view and download portions of your medical record. 10. Click the Download Summary menu link to download a portable copy of your medical information. If you have questions, please visit the Frequently Asked Questions section of the [employer] website. Remember, [employer] is NOT to be used for urgent needs. For medical emergencies, dial 911. Now available from your iPhone and Android! Introducing Grant Barrios As a [employer] patient, I wanted to make you aware of our electronic visit tool called Grant Barrios. [employer] 24/7 allows you to connect within minutes with a medical provider 24 hours a day, seven days a week via a mobile device or tablet or logging into a secure website from your computer. You can access Grant Barrios from anywhere in the [place].  
 
A virtual visit might be right for you when you have a simple condition and feel like you just dont want to get out of bed, or cant get away from work for an appointment, when your regular [address] [place] [address] provider is not available (evenings, weekends or holidays), or when youre out of town and need minor care. Electronic visits cost only $49 and if the [address] [place] [address] 24/7 provider determines a prescription is needed to treat your condition, one can be electronically transmitted to a nearby pharmacy*. Please take a moment to enroll today if you have not already done so. The enrollment process is free and takes just a few minutes. To enroll, please download the [facility] 24/7 cecilia to your tablet or phone, or visit [email or web address] to enroll on your computer. And, as an [address] [place] [address] patient with a [employer] account, the results of your visits will be scanned into your electronic medical record and your primary care provider will be able to view the scanned results. We urge you to continue to see your regular [address] [place] [address] provider for your ongoing medical care. And while your primary care provider may not be the one available when you seek a Diabeticabellfin virtual visit, the peace of mind you get from getting a real diagnosis real time can be priceless. For more information on [employer], view our Frequently Asked Questions (FAQs) at [email or web address]. Sincerely, 
 
Brisa Merchant MD 
Chief Medical Officer [facility] Dea Daniels *:  certain medications cannot be prescribed via [employer] Unresulted Labs-Please follow up with your PCP about these lab tests Order Current Status CULTURE, BLOOD Preliminary result CULTURE, BLOOD Preliminary result Providers Seen During Your Hospitalization Provider Specialty Primary office phone Niko Davis MD Internal Medicine [phone] Immunizations Administered for This Admission Name Date  
 TB Skin Test (PPD) Intradermal  Deferred (), 4/27/2018 Your Primary Care Physician (PCP) Primary Care Physician Office Phone Office Fax Domonique Pardo [phone], Mary Lang [phone] You are allergic to the following No active allergies Recent Documentation Weight BMI Smoking Status 74.9 kg 25.12 kg/m2 Never Smoker Emergency Contacts Name Discharge Info Relation Home Work Mobile Sigrid Clark  Spouse [phone] Patient Belongings The following personal items are in your possession at time of discharge: 
  Dental Appliances: None         Home Medications: None   Jewelry: None  Clothing: With patient, Undergarments, Socks, Shirt, Pants, Footwear, Jacket/Coat, [employer]    Other Valuables: At bedside Please provide this summary of care documentation to your next provider. Signatures-by signing, you are acknowledging that this After Visit Summary has been reviewed with you and you have received a copy. Patient Signature:  ____________________________________________________________ Date:  ____________________________________________________________  
  
[facility] Provider Signature:  ____________________________________________________________ Date:  ____________________________________________________________

## 2018-04-26 NOTE — PROGRESS NOTES
Primary Nurse Eva Shaw RN and Natalia Palacio RN performed a dual skin assessment on this patient Impairment noted- see wound doc flow sheet  Ignacio score is 18    Sacrum red and non blanchable. No breakdown noted. Applied Allevyn foam dressing for prevention of skin breakdown. Right foot 2nd toe with dry dressing intact.

## 2018-04-26 NOTE — PROGRESS NOTES
04/26/18 1848   Dual Skin Pressure Injury Assessment   Dual Skin Pressure Injury Assessment X   Second Care Provider (Based on Facility Policy) Yaneth Dumasates   Sacrum  Mid   Toes Right Toes  (second toe wound)     Sacrum red and non blanchable. No skin breakdown noted. Applied allevyn foam dressing for prevention of skin breakdown. Right foot 2nd toe with dry dressing intact.

## 2018-04-27 PROBLEM — L03.90 CELLULITIS: Status: ACTIVE | Noted: 2018-01-01

## 2018-04-27 NOTE — PROGRESS NOTES
END OF SHIFT NOTE:    INTAKE/OUTPUT  04/26 0701 - 04/27 0700  In: 96 [I.V.:96]  Out: -   Voiding: YES  Catheter: NO  Drain:              Flatus: Patient does have flatus present. Stool:  1 occurrences. Characteristics:       Emesis: 0 occurrences. Characteristics:        VITAL SIGNS  Patient Vitals for the past 12 hrs:   Temp Pulse Resp BP SpO2   04/27/18 0330 98 °F (36.7 °C) 82 16 93/57 98 %   04/26/18 2300 98 °F (36.7 °C) 80 16 90/58 99 %       Pain Assessment  Pain Intensity 1: 0 (04/27/18 0546)             Ambulating  Yes    Shift report given to oncoming nurse at the bedside.     Princess Ozuna

## 2018-04-27 NOTE — PROGRESS NOTES
Hospitalist Progress Note    2018  Admit Date: 2018  6:20 PM   NAME: Ольга An   :  1935   MRN:  [id number]   Attending: Zaira Moise MD  PCP:  Aileen Garica DO    SUBJECTIVE:     Pt is a 81 yo male with pmh CHF EF 25%, pacer/ICD, DM 2 who presented to hospital  as direct admit from office after x rays revealed right 2nd toe/fore foot cellulitis. Pt cannot recall trauma to toe but admits to poor sensation to feet. He has noticed a wound to his toe for about a week or so and finally at the urgency of his wife went to his PCP to be evaluated. Here he was started on Zosyn. He has no fever, leukocytosis. BPs running low but he is asymptomatic. This morning pt reports pain well controlled. Review of Systems negative with exception of pertinent positives noted above  PHYSICAL EXAM     Visit Vitals    BP (!) 84/44  Comment: nurse notified    Pulse 80    Temp 97.9 °F (36.6 °C)    Resp 16    Wt 71.5 kg (157 lb 11.2 oz)    SpO2 95%    BMI 23.98 kg/m2      Temp (24hrs), Av °F (36.7 °C), Min:97.7 °F (36.5 °C), Max:98.2 °F (36.8 °C)    Oxygen Therapy  O2 Sat (%): 95 % (18 0754)    Intake/Output Summary (Last 24 hours) at 18 0855  Last data filed at 18 0754   Gross per 24 hour   Intake               96 ml   Output              150 ml   Net              -54 ml      General: No acute distress    Lungs:  CTA Bilaterally. Heart:  Regular rate and rhythm,  No murmur, rub, or gallop  Abdomen: Soft, Non distended, Non tender, Positive bowel sounds  Extremities: No cyanosis, clubbing.   Right second toe open wound with no obvious drainage, surround redness, poor sensation   Neurologic:  No focal deficits    ASSESSMENT      Active Hospital Problems    Diagnosis Date Noted    Diabetic ulcer of toe ([facility] 2018    Type 2 diabetes mellitus with nephropathy ([facility] 2018    Chronic systolic heart failure ([facility] 2015    Common variable immunodeficiency ([facility] 09/16/2015    Peripheral vascular disease, unspecified ([facility] 09/16/2015    Essential hypertension, benign 09/16/2015     A/P:    Right 2nd toe cellulitis- Cont Zosyn. Obtain wound culture. Wound care consult. Obtain MRI if pacer compatible. If not will get bone scan. Vascular calcifications, poor sensation to feet- Obtain vascular studies. Cont ASA, Plavix. Likely will need vascular. Chronic systolic HF- Hold Lasix today due to hypotension. Give 250 cc NS over 4 hours. Monitor. CKD- Cr stable. Monitor. DC planning- Consult SW and place ppd.       DVT Prophylaxis: Lovenox     Signed By: Zoë Li NP     April 27, 2018

## 2018-04-27 NOTE — PROGRESS NOTES
Pt is an 81 yo  male insured w/[employer] who was directly admitted to [facility] for a diabetic ulcer of right second toe. Pt lives with his spouse, Jeffery Tello (@ cell [phone]), in a 1 story house @ [address] in [place], [address] [place] [address]. House has 3 steps going up to the landing and then 4 steps to the entrance of the home. Pt's Living Will and HCPOA papers are on file. Pt's decision-maker is his spouse, Jeffery Tello. Prior to this hospitalization, pt states that he has been able to ambulate alone, requiring no assistive devices, although pt does own a walker, wc, and cane, that belonged to his wife's family. PLAN:  PT/OT, per MD.  PPD placed. Pt may benefit from [facility] @ D/C. CM to continue to follow pt for any potential D/C needs. Care Management Interventions  PCP Verified by CM:  Yes (Dr Everette More)  Mode of Transport at Discharge: Self  Transition of Care Consult (CM Consult): Discharge Planning  Physical Therapy Consult: Yes (Per PT johann, recommendation is skilled PT)  Current Support Network: Own Home, Lives with Spouse (Pt lives w/spouse/ER contact in 1 level house in [place])  Plan discussed with Pt/Family/Caregiver: Yes  Freedom of Choice Offered: Yes  Discharge Location  Discharge Placement: Unable to determine at this time

## 2018-04-27 NOTE — WOUND CARE
Bilateral lower legs with edema, cool to touch, right greater than left. Bilateral DP pulses are weakly palpable, left PT pulse poor signal per doppler, unable to palpate or doppler pulse right PT. Noted vascular studies including REMY's from 4/27/18 results: Right superficial femoral artery occlusion,Right posterior tibial artery occlusion, Left peroneal artery occlusion. REMY: Right = 0.66 Left = not measurable. 2nd toe right foot with hammer toe deformity, tip with arterial ulcer slough in base, bone exposed, toe nail loose, removed with cleansing. Iodosorb gel and dry gauze applied for now. Concern for deep infection/ osteomyelitis and concern for severe PAD which may be causing the infection to appear less severe than if blood flow was normal. Recommend vascular consult with debridement or amputation of toe if warranted. Will order daily Iodosorb gel and gauze for now. Discussed above briefly with Jagjit Hilliard NP. Will monitor.

## 2018-04-27 NOTE — H&P
Hospitalist H&P/Consult Note     Admit Date:  2018  6:20 PM   Name:  Kassandra Tobar   Age:  80 y.o.  :  1935   MRN:  [id number]   PCP:  Faraz Meza DO  Treatment Team: Attending Provider: Katy Ram MD    HPI:   Pleasant 81 y/o gentleman with hx DM, CAD, CHF, HTN, PAD presents as direct admission from office for infected right second toe. He does not recall having an injury but has poor sensation in his feet. For past two days he has noticed the right second toe become more red and swollen, now draining some pus. He thought it was just an ingrown toenail at first. He denies hx MRSA. Will admit for IV antibiotics, wound care and further vascular workup.     10 systems reviewed and negative except as noted in HPI.no acute chest pain, f/c, SOB  Past Medical History:   Diagnosis Date    Arrhythmia     VT    Atherosclerosis of native arteries of the extremities with intermittent claudication 2015    Automatic implantable cardiac defibrillator in situ 2015    CAD (coronary artery disease)     inoperable, medical management only    Chronic systolic heart failure ([address] 2015    Common variable immunodeficiency ([address] 2015    Congestive heart failure, unspecified 2015    Diabetes ([address] x 15 yrs    type 2- sqbs am avg ---hypo at 52's    Diseases of tricuspid valve 2015    Essential hypertension, benign 2015    Heart failure (HCC)     EF 25-30%    Hypertension x 1996    controlled with meds    Occlusion and stenosis of carotid artery without mention of cerebral infarction 2015    Other chronic pulmonary heart diseases 2015    Other primary cardiomyopathies 2015    PAD (peripheral artery disease) (HCC)     carotid dz    Pain in limb 2015    Paroxysmal ventricular tachycardia ([address] 2015    Peripheral vascular disease, unspecified ([address] 2015    Type II or unspecified type diabetes mellitus without mention of complication, not stated as uncontrolled 9/16/2015      Past Surgical History:   Procedure Laterality Date    HX HERNIA REPAIR      HX PACEMAKER      defib placements x 2      Prior to Admission Medications   Prescriptions Last Dose Informant Patient Reported? Taking? Iron [id number] (MULTIGEN FOLIC) tab capsule   Yes No   Sig: Take 1 Tab by mouth daily. amiodarone (CORDARONE) 200 mg tablet   No No   Sig: Take 1 Tab by mouth daily. aspirin 81 mg chewable tablet   Yes No   Sig: Take 81 mg by mouth every morning. atorvastatin (LIPITOR) 40 mg tablet   No No   Sig: Take 1 Tab by mouth nightly. brinzolamide (AZOPT) 1 % ophthalmic suspension   Yes No   Sig: Administer 1 Drop to both eyes two (2) times a day. clopidogrel (PLAVIX) 75 mg tab   No No   Sig: Take 1 Tab by mouth daily. furosemide (LASIX) 80 mg tablet   No No   Sig: Take 1 Tab by mouth daily. latanoprost (XALATAN) 0.005 % ophthalmic solution   Yes No   Sig: Administer 1 Drop to both eyes nightly. levothyroxine (SYNTHROID) 25 mcg tablet   Yes No   Sig: Take  by mouth Daily (before breakfast). Facility-Administered Medications: None     No Known Allergies   Social History   Substance Use Topics    Smoking status: Never Smoker    Smokeless tobacco: Never Used    Alcohol use No      Family History   Problem Relation Age of Onset    Diabetes Sister       There is no immunization history for the selected administration types on file for this patient. Objective:   Patient Vitals for the past 24 hrs:   Temp Pulse Resp BP SpO2   04/26/18 1952 98.2 °F (36.8 °C) 84 16 94/62 93 %   04/26/18 1824 97.7 °F (36.5 °C) 81 17 90/58 94 %     Oxygen Therapy  O2 Sat (%): 93 % (04/26/18 1952)  No intake or output data in the 24 hours ending 04/26/18 4540    Physical Exam:  General:    Well nourished. Alert. Eyes:   Normal sclera. Extraocular movements intact. ENT:  Normocephalic, atraumatic. Moist mucous membranes  CV:   RRR.   No murmur, rub, or gallop. Lungs:  CTAB. No wheezing, rhonchi, or rales. Abdomen: Soft, nontender, nondistended. Bowel sounds normal.   Extremities: Warm and dry. No cyanosis or edema. Neurologic: CN II-XII grossly intact. decreased light touch sensation feet  Skin:     No rashes or jaundice. Infected right second toe with drainage  Toenail is partially avulsed. Diminished pulses. Some erythema up shin   Psych:  Normal mood and affect. I reviewed the labs, imaging, EKGs, telemetry, and other studies done this admission.   Data Review:   Recent Results (from the past 24 hour(s))   GLUCOSE, POC    Collection Time: 04/26/18  9:07 PM   Result Value Ref Range    Glucose (POC) 168 (H) 65 - 100 mg/dL       Imaging Studies:  CXR Results  (Last 48 hours)    None        CT Results  (Last 48 hours)    None          Assessment and Plan:     Hospital Problems as of 4/26/2018  Date Reviewed: 4/18/2018          Codes Class Noted - Resolved POA    Diabetic ulcer of toe ([facility] ICD-10-CM: E11.621, L97.509  ICD-9-CM: 250.80, 707.15  4/26/2018 - Present Unknown        Type 2 diabetes mellitus with nephropathy ([facility] ICD-10-CM: E11.21  ICD-9-CM: 250.40, 583.81  2/8/2018 - Present Yes        Chronic systolic heart failure ([address] ICD-10-CM: I50.22  ICD-9-CM: 428.22  9/16/2015 - Present Yes        Common variable immunodeficiency ([address] ICD-10-CM: D83.9  ICD-9-CM: 279.06  9/16/2015 - Present Yes        Peripheral vascular disease, unspecified ([address] ICD-10-CM: I73.9  ICD-9-CM: 443.9  9/16/2015 - Present Yes        Essential hypertension, benign ICD-10-CM: I10  ICD-9-CM: 401.1  9/16/2015 - Present Yes              PLAN:  · Admit as inpatient, medical bed  · Obtain lactic acid/blood cultures, wound culture  · Start IV Zosyn for pseudomonas coverage  · Wound care consult  · Need xray report from referring facility to see if indicates osteomyelitis of toe  · May need further imaging with MRI  · Arterial doppler US with REMY to r/o severe PAD  · May need vascular surgery consultation  · lovenox for DVT prophylaxis      Estimated LOS: greater two midnights    Signed:  Krista Armijo MD

## 2018-04-27 NOTE — PROGRESS NOTES
Problem: Mobility Impaired (Adult and Pediatric)  Goal: *Acute Goals and Plan of Care (Insert Text)  STG:  (1.)Mr. Martha Morales will move from supine to sit and sit to supine , scoot up and down and roll side to side with MODIFIED INDEPENDENCE within 7 treatment day(s). (2.)Mr. Martha Morales will transfer from bed to chair and chair to bed with MODIFIED INDEPENDENCE using the least restrictive device within 7 treatment day(s). (3.)Mr. Martha Morales will ambulate with MODIFIED INDEPENDENCE for 1000 feet with the least restrictive device within 7 treatment day(s). (4.)Mr. Martha Morales will ascend/descend with MODIFIED INDEPENDENCE with one hand rail within 7 treatment days. PHYSICAL THERAPY: Initial Assessment, Treatment Day: Day of Assessment, PM 4/27/2018  INPATIENT: Hospital Day: 2  Payor: [employer] / Plan: [employer]NA MEDICARE CHOICE PPO/PFFS / Product Type: [employer] Medicare /      NAME/AGE/GENDER: Camille Tomlinson is a 80 y.o. male   PRIMARY DIAGNOSIS: R necrotic foot infection  Diabetic ulcer of toe ([facility] <principal problem not specified> <principal problem not specified>        ICD-10: Treatment Diagnosis:    · Generalized Muscle Weakness (M62.81)  · Difficulty in walking, Not elsewhere classified (R26.2)   Precaution/Allergies:  Review of patient's allergies indicates no known allergies. ASSESSMENT:     Mr. Martha Morales is a pleasant 80 y.o. Male with primary diagnosis of R necrotic foot infection. Pt is sitting EOB, A&O x 4, and agreeable to PT evaluation. Pt had minimal bleeding from small wounds on B elbows. PCT notified, and PCT applied bandaids to both. Pt stated he has thin skin, and that the wounds were probably from having his elbows on the table, and that this was normal. Pt states he lives with his wife in single story home with 6 steps to enter with a handrail and walk in shower with grab bars.  Pt states he is independent with  ADLs at baseline, and ambulates with cane for community distances for \"safety\" and no AD around house. Pt frequently displays knowledge of safety awareness, and reports no falls in the last 6 months. Pt BP was 96/63, which he states is normal and denies any symptoms of hypotension. Pt denies any pain in toe, stating he can't feel anything. Pt performed STS with SBA, demonstrating good standing balance. Pt ambulated 250ft with RW and SBA, demonstrating slow, shuffling gait with decreased step clearance. Pt returned to sitting EOB with all needs met and within reach. Pt states he is feeling better, but is not back to his baseline yet. Educated pt to call for assistance before getting out of bed. Pt verbalized understanding. Pt will benefit from skilled therapy for duration of hospital stay to address decreased activity tolerance. No discharges needs anticipated at this time. This section established at most recent assessment   PROBLEM LIST (Impairments causing functional limitations):  1. Decreased ADL/Functional Activities  2. Decreased Transfer Abilities  3. Decreased Ambulation Ability/Technique  4. Decreased Balance  5. Decreased Activity Tolerance  6. Decreased Pacing Skills  7. Increased Fatigue  8. Decreased [place] with Home Exercise Program   INTERVENTIONS PLANNED: (Benefits and precautions of physical therapy have been discussed with the patient.)  1. Balance Exercise  2. Bed Mobility  3. Cold  4. Family Education  5. Gait Training  6. Home Exercise Program (HEP)  7. Manual Therapy  8. Neuromuscular Re-education/Strengthening  9. Range of Motion (ROM)  10. Therapeutic Activites  11. Therapeutic Exercise/Strengthening  12. Transfer Training  13.  Group Therapy     TREATMENT PLAN: Frequency/Duration: 3 times a week for duration of hospital stay  Rehabilitation Potential For Stated Goals: Good     RECOMMENDED REHABILITATION/EQUIPMENT: (at time of discharge pending progress): Due to the probability of continued deficits (see above) this patient will not likely need continued skilled physical therapy after discharge. Equipment:    None at this time              HISTORY:   History of Present Injury/Illness (Reason for Referral):  Pleasant 81 y/o gentleman with hx DM, CAD, CHF, HTN, PAD presents as direct admission from office for infected right second toe. He does not recall having an injury but has poor sensation in his feet. For past two days he has noticed the right second toe become more red and swollen, now draining some pus. He thought it was just an ingrown toenail at first. He denies hx MRSA. Will admit for IV antibiotics, wound care and further vascular workup.     10 systems reviewed and negative except as noted in HPI.no acute chest pain, f/c, SOB  Past Medical History/Comorbidities:   Mr. Lopez Rich  has a past medical history of Arrhythmia (2004); Atherosclerosis of native arteries of the extremities with intermittent claudication (9/16/2015); Automatic implantable cardiac defibrillator in situ (9/16/2015); CAD (coronary artery disease); Chronic systolic heart failure ([address] (9/16/2015); Common variable immunodeficiency ([address] (9/16/2015); Congestive heart failure, unspecified (9/16/2015); Diabetes ([address] (x 15 yrs); Diseases of tricuspid valve (9/16/2015); Essential hypertension, benign (9/16/2015); Heart failure ([address] Hypertension (x 1996); Occlusion and stenosis of carotid artery without mention of cerebral infarction (9/16/2015); Other chronic pulmonary heart diseases (9/16/2015); Other primary cardiomyopathies (9/16/2015); PAD (peripheral artery disease) ([address] Pain in limb (9/16/2015); Paroxysmal ventricular tachycardia ([address] (9/16/2015); Peripheral vascular disease, unspecified ([address] (9/16/2015); and Type II or unspecified type diabetes mellitus without mention of complication, not stated as uncontrolled (9/16/2015). He also has no past medical history of Arthritis; Asthma; Autoimmune disease ([address] Cancer ([address]  Chronic kidney disease; Chronic obstructive pulmonary disease ([facility] Difficult intubation; GERD (gastroesophageal reflux disease); Liver disease; Malignant hyperthermia due to anesthesia; Nausea & vomiting; Pseudocholinesterase deficiency; Psychiatric disorder; PUD (peptic ulcer disease); Seizures ([facility] Sleep apnea; Stroke [facility]); Thromboembolus ([facility] or Thyroid disease. Mr. Bao Ruiz  has a past surgical history that includes hx pacemaker and hx hernia repair. Social History/Living Environment:   Home Environment: Private residence  # Steps to Enter: 6  Rails to Enter: Yes  Hand Rails : Right  One/Two Story Residence: One story  Living Alone: No  Support Systems: Spouse/Significant Other/Partner  Patient Expects to be Discharged to[de-identified] Private residence  Current DME Used/Available at Home: [address] [facility] [place], straight  Tub or Shower Type: Shower  Prior Level of Function/Work/Activity:  Independent, living with wife, driving, no falls, ambulates with cane for community distances   Number of Personal Factors/Comorbidities that affect the Plan of Care: 3+: HIGH COMPLEXITY   EXAMINATION:   Most Recent Physical Functioning:   Gross Assessment:  AROM: Within functional limits  Strength: Within functional limits  Coordination: Within functional limits               Posture:     Balance:  Sitting: Intact; Without support  Standing: Intact; With support Bed Mobility:     Wheelchair Mobility:     Transfers:  Sit to Stand: Stand-by assistance  Stand to Sit: Stand-by assistance  Gait:     Base of Support: Narrowed  Speed/Maru: Shuffled; Slow  Step Length: Left shortened;Right shortened  Gait Abnormalities: Decreased step clearance  Distance (ft): 250 Feet (ft)  Assistive Device: Gait belt;Walker, rolling  Ambulation - Level of Assistance: Stand-by assistance      Body Structures Involved:  1. Heart  2. Lungs  3. Bones  4. Joints  5. Muscles  6. Ligaments Body Functions Affected:  1. Sensory/Pain  2. Cardio  3. Respiratory  4. Neuromusculoskeletal  5.  Movement Related Activities and Participation Affected:  1. Mobility  2. Domestic Life  3. Interpersonal Interactions and Relationships  4. Community, Social and [place]   Number of elements that affect the Plan of Care: 4+: HIGH COMPLEXITY   CLINICAL PRESENTATION:   Presentation: Stable and uncomplicated: LOW COMPLEXITY   CLINICAL DECISION MAKIN [facility] Mobility Inpatient Short Form  How much difficulty does the patient currently have. .. Unable A Lot A Little None   1. Turning over in bed (including adjusting bedclothes, sheets and blankets)? [] 1   [] 2   [] 3   [x] 4   2. Sitting down on and standing up from a chair with arms ( e.g., wheelchair, bedside commode, etc.)   [] 1   [] 2   [] 3   [x] 4   3. Moving from lying on back to sitting on the side of the bed? [] 1   [] 2   [] 3   [x] 4   How much help from another person does the patient currently need. .. Total A Lot A Little None   4. Moving to and from a bed to a chair (including a wheelchair)? [] 1   [] 2   [] 3   [x] 4   5. Need to walk in hospital room? [] 1   [] 2   [] 3   [x] 4   6. Climbing 3-5 steps with a railing? [] 1   [x] 2   [] 3   [] 4   © , Trustees of [address] [place] [address], under license to [employer]. All rights reserved      Score:  Initial: 22 Most Recent: X (Date: -- )    Interpretation of Tool:  Represents activities that are increasingly more difficult (i.e. Bed mobility, Transfers, Gait). Score 24 23 22-20 19-15 14-10 9-7 6     Modifier CH CI CJ CK CL CM CN      ?  Mobility - Walking and Moving Around:     - CURRENT STATUS: CJ - 20%-39% impaired, limited or restricted    - GOAL STATUS: CI - 1%-19% impaired, limited or restricted    - D/C STATUS:  ---------------To be determined---------------  Payor: HUMANA MEDICARE / Plan: [facility] HUMANA MEDICARE CHOICE PPO/PFFS / Product Type: Managed Care Medicare /      Medical Necessity:     · Patient demonstrates good rehab potential due to higher previous functional level. Reason for Services/Other Comments:  · Patient continues to require skilled intervention due to decreased activity tolerance. Use of outcome tool(s) and clinical judgement create a POC that gives a: Clear prediction of patient's progress: LOW COMPLEXITY            TREATMENT:   (In addition to Assessment/Re-Assessment sessions the following treatments were rendered)   Pre-treatment Symptoms/Complaints: \"Feeling ok, just hungry\"  Pain: Initial:   Pain Intensity 1: 0  Post Session:  0/10     Assessment/Reassessment only, no treatment provided today    Braces/Orthotics/Lines/Etc:   · IV  ·    Treatment/Session Assessment:    · Response to Treatment:  Ambulated 250ft with SBA and RW  · Interdisciplinary Collaboration:   o Physical Therapist  o Registered Nurse  o Certified Nursing Assistant/Patient Care Technician  o SPT  · After treatment position/precautions:   o Bed/Chair-wheels locked  o Bed in low position  o Call light within reach  o sitting EOB   · Compliance with Program/Exercises: Will assess as treatment progresses. · Recommendations/Intent for next treatment session: \"Next visit will focus on advancements to more challenging activities and reduction in assistance provided\".   Total Treatment Duration:  PT Patient Time In/Time Out  Time In: 1322  Time Out: [address] [employer] Penny Marquez

## 2018-04-28 PROBLEM — I70.261 ATHEROSCLEROSIS OF RIGHT LOWER EXTREMITY WITH GANGRENE (HCC): Status: ACTIVE | Noted: 2018-01-01

## 2018-04-28 NOTE — PROGRESS NOTES
Hospitalist Progress Note    2018  Admit Date: 2018  6:20 PM   NAME: Lázaro Singleton   :  1935   MRN:  [id number]   Attending: Bonny Herrmann MD  PCP:  Chanel Eden DO    SUBJECTIVE:     Pt is a 81 yo male with pmh CHF EF 25%, pacer/ICD, DM 2 who presented to hospital  as direct admit from office after x rays revealed right 2nd toe/fore foot cellulitis. Pt cannot recall trauma to toe but admits to poor sensation to feet. He has noticed a wound to his toe for about a week or so and finally at the urgency of his wife went to his PCP to be evaluated. Here he was started on Zosyn. He has no fever, leukocytosis. BPs running low but he is asymptomatic. This morning pt reports pain well controlled. Lower extremity doppler revealed Right superficial femoral artery occlusion. Right posterior tibial artery occlusion. Left peroneal artery occlusion. Vascular consulted and wants to remove right second toe, patient refusing surgery. Spoke with patient, patient open to conversation regarding surgery. Patient instructed to call if he decides to agree to surgery. Bone scan to be performed today. Cr 2.56. Appears at baseline compared to levels through 2018. Patient denies n/v/d, CP, SOB. Review of Systems negative with exception of pertinent positives noted above  PHYSICAL EXAM     Visit Vitals    BP 99/63 (BP 1 Location: Left arm, BP Patient Position: At rest)    Pulse 80    Temp 98.3 °F (36.8 °C)    Resp 18    Wt 71.8 kg (158 lb 3.2 oz)    SpO2 94%    BMI 24.05 kg/m2      Temp (24hrs), Av.1 °F (36.7 °C), Min:97.8 °F (36.6 °C), Max:98.3 °F (36.8 °C)    Oxygen Therapy  O2 Sat (%): 94 % (18 0740)    Intake/Output Summary (Last 24 hours) at 18 1305  Last data filed at 18 0904   Gross per 24 hour   Intake             1276 ml   Output              785 ml   Net              491 ml      General: No acute distress    Lungs:  CTA Bilaterally.  Room air  Heart:  Regular rate and rhythm,  No murmur, rub, or gallop  Abdomen: Soft, Non distended, Non tender, Positive bowel sounds  Extremities: No cyanosis, clubbing. RLE edema +2. Dressing cdi. 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 ([facility] 04/28/2018    Cellulitis 04/27/2018    Diabetic ulcer of toe ([facility] 04/26/2018    Type 2 diabetes mellitus with nephropathy ([facility] 02/08/2018    Chronic systolic heart failure ([facility] 09/16/2015    Common variable immunodeficiency ([facility] 09/16/2015    Peripheral vascular disease, unspecified ([facility] 09/16/2015    Essential hypertension, benign 09/16/2015     A/P:    Right 2nd toe cellulitis-   Cont Zosyn. Obtain wound culture. Wound care consult. Bone scan today  Vascular consulted. Signed off    Vascular calcifications, poor sensation to feet-   Cont ASA, Plavix. Chronic systolic HF-   Hold Lasix today due to hypotension. Monitor. CKD- Cr stable. Monitor. DC planning- Consult SW and place ppd.       DVT Prophylaxis: Lovenox     Plan of care discussed with supervising MD, Dr. Puneet Nelson,     Signed By: Arleen Quintero NP     April 28, 2018

## 2018-04-28 NOTE — PROGRESS NOTES
Problem: Pressure Injury - Risk of  Goal: *Prevention of pressure injury  Document Ignacio Scale and appropriate interventions in the flowsheet. Outcome: Progressing Towards Goal  Pressure Injury Interventions:  Sensory Interventions: Assess changes in LOC, Assess need for specialty bed, Float heels, Turn and reposition approx. every two hours (pillows and wedges if needed)    Moisture Interventions: Apply protective barrier, creams and emollients, Maintain skin hydration (lotion/cream)    Activity Interventions: Increase time out of bed, Pressure redistribution bed/mattress(bed type), PT/OT evaluation    Mobility Interventions: Float heels, HOB 30 degrees or less, Pressure redistribution bed/mattress (bed type), Turn and reposition approx. every two hours(pillow and wedges)    Nutrition Interventions: Document food/fluid/supplement intake    Friction and Shear Interventions: HOB 30 degrees or less, Lift sheet               Problem: Falls - Risk of  Goal: *Absence of Falls  Document Leila Fall Risk and appropriate interventions in the flowsheet.    Outcome: Progressing Towards Goal  Fall Risk Interventions:  Mobility Interventions: Communicate number of staff needed for ambulation/transfer, Patient to call before getting OOB         Medication Interventions: Patient to call before getting OOB, Teach patient to arise slowly    Elimination Interventions: Call light in reach, Patient to call for help with toileting needs, Toileting schedule/hourly rounds

## 2018-04-28 NOTE — PROGRESS NOTES
END OF SHIFT NOTE:    INTAKE/OUTPUT  04/27 0701 - 04/28 0700  In: 1276 [I.V.:1276]  Out: 635 [Urine:635]  Voiding: YES  Catheter: NO  Drain:              Flatus: Patient does have flatus present. Stool:  0 occurrences. Characteristics:       Emesis: 0 occurrences. Characteristics:        VITAL SIGNS  Patient Vitals for the past 12 hrs:   Temp Pulse Resp BP SpO2   04/28/18 0314 98.3 °F (36.8 °C) 84 18 (!) 85/51 95 %   04/27/18 2345 98.3 °F (36.8 °C) 78 19 90/57 96 %       Pain Assessment  Pain Intensity 1: 0 (04/27/18 1946)        Patient Stated Pain Goal: 0    Ambulating  Yes    Shift report given to oncoming nurse at the bedside.     Eri Mijares RN

## 2018-04-28 NOTE — CONSULTS
[facility]   [address] [facility] [address] Melissa. [address] [place] [id number]  Phone: [phone]  Fax: [phone]    Subjective:      Santosh Frank is a 80 y.o. male who presents for evaluation of an infected ulcer on the right second toe. Patient admitted for intravenous antibiotics after presenting to the hospital by way of direct admission for an infection of the right second toe. He has multiple other medical problems as listed in his history and physical.  Patient states that this is been going on for probably at least a month but he is neglected it until recently. He is not in any pain or discomfort. Lower extremity arterial ultrasound shows that the right superficial femoral artery is occluded. There is also some tibial artery PAD. The toe pressure on the right foot is 64 mmHg. The report and images were reviewed.     Past Medical History:   Diagnosis Date    Arrhythmia 2004    VT    Atherosclerosis of native arteries of the extremities with intermittent claudication 9/16/2015    Automatic implantable cardiac defibrillator in situ 9/16/2015    CAD (coronary artery disease)     inoperable, medical management only    Chronic systolic heart failure ([address] 9/16/2015    Common variable immunodeficiency ([address] 9/16/2015    Congestive heart failure, unspecified 9/16/2015    Diabetes ([address] x 15 yrs    type 2- sqbs am avg ---hypo at 52's    Diseases of tricuspid valve 9/16/2015    Essential hypertension, benign 9/16/2015    Heart failure (HCC)     EF 25-30%    Hypertension x 1996    controlled with meds    Occlusion and stenosis of carotid artery without mention of cerebral infarction 9/16/2015    Other chronic pulmonary heart diseases 9/16/2015    Other primary cardiomyopathies 9/16/2015    PAD (peripheral artery disease) (HCC)     carotid dz    Pain in limb 9/16/2015    Paroxysmal ventricular tachycardia ([address] 9/16/2015    Peripheral vascular disease, unspecified ([facility] 9/16/2015    Type II or unspecified type diabetes mellitus without mention of complication, not stated as uncontrolled 9/16/2015     Past Surgical History:   Procedure Laterality Date    HX HERNIA REPAIR      HX PACEMAKER      defib placements x 2      Family History   Problem Relation Age of Onset    Diabetes Sister      Social History     Social History    Marital status:      Spouse name: N/A    Number of children: N/A    Years of education: N/A     Social History Main Topics    Smoking status: Never Smoker    Smokeless tobacco: Never Used    Alcohol use No    Drug use: No    Sexual activity: Not on file     Other Topics Concern    Not on file     Social History Narrative      Current Facility-Administered Medications   Medication Dose Route Frequency    alcohol 62% (NOZIN) nasal  1 Ampule  1 Ampule Topical Q12H    aspirin chewable tablet 81 mg  81 mg Oral DAILY    latanoprost (XALATAN) 0.005 % ophthalmic solution 1 Drop  1 Drop Both Eyes QHS    levothyroxine (SYNTHROID) tablet 25 mcg  25 mcg Oral ACB    amiodarone (CORDARONE) tablet 200 mg  200 mg Oral DAILY    atorvastatin (LIPITOR) tablet 40 mg  40 mg Oral QHS    clopidogrel (PLAVIX) tablet 75 mg  75 mg Oral DAILY    sodium chloride (NS) flush 5-10 mL  5-10 mL IntraVENous Q8H    sodium chloride (NS) flush 5-10 mL  5-10 mL IntraVENous PRN    dextrose 40% (GLUTOSE) oral gel 1 Tube  15 g Oral PRN    glucagon (GLUCAGEN) injection 1 mg  1 mg IntraMUSCular PRN    dextrose (D50W) injection syrg 12.5-25 g  25-50 mL IntraVENous PRN    insulin lispro (HUMALOG) injection   SubCUTAneous AC&HS    acetaminophen (TYLENOL) tablet 650 mg  650 mg Oral Q4H PRN    HYDROcodone-acetaminophen (NORCO) 5-325 mg per tablet 1 Tab  1 Tab Oral Q4H PRN    ondansetron (ZOFRAN) injection 4 mg  4 mg IntraVENous Q4H PRN    diphenhydrAMINE (BENADRYL) capsule 25 mg  25 mg Oral Q4H PRN    naloxone (NARCAN) injection 0.4 mg  0.4 mg IntraVENous PRN    magnesium hydroxide (MILK OF MAGNESIA) 400 mg/5 mL oral suspension 30 mL  30 mL Oral DAILY PRN    enoxaparin (LOVENOX) injection 30 mg  30 mg SubCUTAneous Q24H    dorzolamide (TRUSOPT) 2 % ophthalmic solution 1 Drop  1 Drop Both Eyes TID    piperacillin-tazobactam (ZOSYN) 3.375 g in 0.9% sodium chloride (MBP/ADV) 100 mL  3.375 g IntraVENous Q8H    polyethylene glycol (MIRALAX) packet 17 g  17 g Oral DAILY    bisacodyl (DULCOLAX) suppository 10 mg  10 mg Rectal DAILY PRN      No Known Allergies    Review of Systems:  A comprehensive review of systems was negative except for that written in the History of Present Illness. Objective:     Patient Vitals for the past 8 hrs:   BP Temp Pulse Resp SpO2   18 0740 99/63 98.3 °F (36.8 °C) 80 18 94 %     Temp (24hrs), Av.1 °F (36.7 °C), Min:97.8 °F (36.6 °C), Max:98.3 °F (36.8 °C)      Physical Exam:  Visit Vitals    BP 99/63 (BP 1 Location: Left arm, BP Patient Position: At rest)    Pulse 80    Temp 98.3 °F (36.8 °C)    Resp 18    Wt 158 lb 3.2 oz (71.8 kg)    SpO2 94%    BMI 24.05 kg/m2     General appearance: alert, cooperative, no distress, appears stated age  Head: Normocephalic, without obvious abnormality, atraumatic  Extremities: extremities normal, atraumatic, no cyanosis or edema  Pulses: Pedal pulses are not palpable in the right foot. There is a necrotic ulcer at the tip of the right second toe with possible exposed bone. There is mild erythema. The right forefoot is soft, nontender, and without fluctuance or crepitus. There are no other ischemic changes. Assessment:     Right superficial femoral artery occlusion with necrotic infected ulcer right second toe. Plan:     I spoke with the patient regarding his condition and my recommendations for treatment. He will likely require amputation of the right second toe and subsequent efforts to revascularize the right leg might also be needed.   The patient said that he could not have surgery and would refuse to have any surgery because \"I will die if I have surgery\". I discussed the natural history of the disease and that this will likely worsen and potentially cause overwhelming infection that could require surgery to remove the leg and possibly even cause death. Nonetheless, he refuses to have surgery. Under the circumstances, I do not think I have anything to offer this patient. I will sign off. Signed By: Richelle Dial MD     April 28, 2018      Elements of this note have been dictated using speech recognition software. As a result, errors of speech recognition may have occurred.

## 2018-04-28 NOTE — PROGRESS NOTES
END OF SHIFT NOTE:    INTAKE/OUTPUT  04/27 0701 - 04/28 0700  In: 1276 [I.V.:1276]  Out: 635 [Urine:635]  Voiding: YES  Catheter: NO  Drain:              Flatus: Patient does have flatus present. Stool:  0 occurrences. Characteristics:       Emesis: 0 occurrences. Characteristics:        VITAL SIGNS  Patient Vitals for the past 12 hrs:   Temp Pulse Resp BP SpO2   04/28/18 1633 - - - (!) 89/62 -   04/28/18 1607 97.8 °F (36.6 °C) 80 18 (!) 77/52 95 %   04/28/18 1401 - - - 96/66 -   04/28/18 1314 - - - (!) 76/44 -   04/28/18 1129 97.9 °F (36.6 °C) 80 18 (!) 79/53 94 %   04/28/18 0740 98.3 °F (36.8 °C) 80 18 99/63 94 %       Pain Assessment  Pain Intensity 1: 0 (04/28/18 1545)        Patient Stated Pain Goal: 0    Ambulating  Yes    Shift report given to oncoming nurse at the bedside.     Cecile Rivera RN

## 2018-04-29 NOTE — PROGRESS NOTES
END OF SHIFT NOTE:    INTAKE/OUTPUT  04/28 0701 - 04/29 0700  In: 1 [P.O.:120; I.V.:97]  Out: 350 [Urine:350]  Voiding: YES  Catheter: NO  Drain:              Flatus: Patient does have flatus present. Stool:  0 occurrences. Characteristics:       Emesis: 0 occurrences. Characteristics:        VITAL SIGNS  Patient Vitals for the past 12 hrs:   Temp Pulse Resp BP SpO2   04/29/18 1144 97.6 °F (36.4 °C) 80 18 93/61 99 %   04/29/18 0732 98 °F (36.7 °C) 81 18 (!) 89/50 96 %       Pain Assessment  Pain Intensity 1: 0 (04/29/18 1346)        Patient Stated Pain Goal: 0    Ambulating  Yes    Shift report given to oncoming nurse at the bedside.     Emily Pool RN

## 2018-04-29 NOTE — PROGRESS NOTES
Hospitalist Progress Note    2018  Admit Date: 2018  6:20 PM   NAME: Daniella Blue   :  1935   MRN:  [id number]   Attending: Shawna Mazariegos MD  PCP:  Wilton Barrios DO    SUBJECTIVE:     Pt is a 79 yo male with pmh CHF EF 25%, pacer/ICD, DM 2 who presented to hospital  as direct admit from office after x rays revealed right 2nd toe/fore foot cellulitis. Pt cannot recall trauma to toe but admits to poor sensation to feet. He has noticed a wound to his toe for about a week or so and finally at the urgency of his wife went to his PCP to be evaluated. Here he was started on Zosyn. He has no fever, leukocytosis. BPs continue to run low he is asymptomatic. Patient reports pain well controlled. Lower extremity doppler revealed Right superficial femoral artery occlusion. Right posterior tibial artery occlusion. Left peroneal artery occlusion. Vascular consulted and wants to remove right second toe, patient refused surgery originally and now has decided to proceed. Vascular consulted again. Bone scan suspicious for osteomyelitis in second toe. Cr 2.65. Appears at baseline compared to levels through 2018. Patient denies n/v/d, CP, SOB. Review of Systems negative with exception of pertinent positives noted above  PHYSICAL EXAM     Visit Vitals    BP (!) 89/50    Pulse 81    Temp 98 °F (36.7 °C)    Resp 18    Wt 72.3 kg (159 lb 8 oz)    SpO2 96%    BMI 24.25 kg/m2      Temp (24hrs), Av °F (36.7 °C), Min:97.7 °F (36.5 °C), Max:98.6 °F (37 °C)    Oxygen Therapy  O2 Sat (%): 96 % (18 0732)  O2 Device: Room air (18 0300)    Intake/Output Summary (Last 24 hours) at 18 1048  Last data filed at 18 0900   Gross per 24 hour   Intake              217 ml   Output              125 ml   Net               92 ml      General: No acute distress    Lungs:  CTA Bilaterally. Room air  Heart:  Regular rate and rhythm,  No murmur, rub, or gallop.  ICD  Abdomen: Soft, Non distended, Non tender, Positive bowel sounds  Extremities: No cyanosis, clubbing. RLE edema +2. Dressing cdi. 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 ([facility] 04/28/2018    Cellulitis 04/27/2018    [facility]abetic ulcer of toe ([facility] 04/26/2018    Type 2 diabetes mellitus with nephropathy ([facility] 02/08/2018    Chronic systolic heart failure ([facility] 09/16/2015    Common variable immunodeficiency ([facility] 09/16/2015    Peripheral vascular disease, unspecified ([facility] 09/16/2015    Essential hypertension, benign 09/16/2015     A/P:    Right 2nd toe cellulitis- possible osteomylitis  Cont Zosyn. Follow wound culture  Wound care consult - dressing changes  Vascular consulted again  ID consult    Vascular calcifications, poor sensation to feet-   Cont ASA, Plavix. Chronic systolic HF-   Hold Lasix today due to hypotension. Monitor. (Office visit in April noted chronic hypotension BP 84/56)    CKD- Cr stable. Monitor. DC planning- Consult SW and place ppd.       DVT Prophylaxis: Lovenox     Plan of care discussed with supervising MD, Dr. Dora Godinez By: Robyn Aguilera, NP     April 29, 2018

## 2018-04-29 NOTE — PROGRESS NOTES
End Of Shift Note: 7p ~ 7a    Afebrile this shift. Continues on IVAB. Dressing intact to right foot. Xeroform gauze, 2x2 and coban applied to right anterior hand skin tear. No c/o pain or nausea. Pt reports difficulty sleeping despite Benadryl PO this shift. AM labs pending. Continuing with current POC.     Report to Jackie Anderson RN at bedside

## 2018-04-29 NOTE — PROGRESS NOTES
Notified Hospitalist of patient's low urinary output. Voided 165 ml nj urine. Verbal order for bladder scan.

## 2018-04-30 PROBLEM — I25.10 CAD (CORONARY ARTERY DISEASE): Status: ACTIVE | Noted: 2018-01-01

## 2018-04-30 NOTE — PROGRESS NOTES
vancomycin Consult (Day 1)    MD ordering: princess JETER following? yes  Indication: bone and joint infection (osteo)  DOT:  TBD  days  Goal level(s): 15-20    Allergies as of 2018    (No Known Allergies)     Current Antimicrobial Therapy (168h ago through future)      Ordered     Start Stop      18 09  vancomycin (VANCOCIN) 1,000 mg in 0.9% sodium chloride (MBP/ADV) 250 mL  1 g,   IntraVENous,   EVERY 24 HOURS      18 0900 --    18 09  metroNIDAZOLE (FLAGYL) tablet 500 mg  500 mg,   Oral,   EVERY 8 HOURS      18 1000 --    18 0935  cefTRIAXone (ROCEPHIN) 2 g in 0.9% sodium chloride (MBP/ADV) 50 mL  2 g,   IntraVENous,   EVERY 24 HOURS      18 1000 --    18 09  vancomycin (VANCOCIN) 1250 mg in  ml infusion  1,250 mg,   IntraVENous,   ONCE      18 1000 18            All Micro Results       Procedure Component Value Units Date/Time      CULTURE, BLOOD [[id number]] Collected:  18    Order Status:  Completed Specimen:  Blood from Blood Updated:  18     Special Requests: --        RIGHT  Antecubital       Culture result: NO GROWTH 3 DAYS       CULTURE, BLOOD [[id number]] Collected:  18    Order Status:  Completed Specimen:  Blood from Blood Updated:  18     Special Requests: --        LEFT  Antecubital       Culture result: NO GROWTH 3 DAYS       CULTURE, Adan Dipak STAIN [[id number]] Collected:  18    Order Status:  Canceled Specimen:  Wound from Ulcer             Temp (24hrs), Av.1 °F (36.7 °C), Min:97.6 °F (36.4 °C), Max:98.6 °F (37 °C)    UOP: mL/kg/hr  Dosing weight: 73.6 kg   80 y.o.     Date:  Dose/Freq Admin Times Level/Time:    1250mg x 1 (1000)    5/1/ 1000mg q 24 h (0900)                  Recent Labs      18   0544  18   19118   0602   BUN  39*  42*  40*   CREA  2.63*  2.78*  2.65*   WBC  6.3  6.7  7.1     Estimated Creatinine Clearance: 21 mL/min (based on Cr of 2.63). A/P:  patient to receive 1250mg IV x 1 today and will be followed by 1G IVPB q 24 h to target a trough of 15-20.

## 2018-04-30 NOTE — CONSULTS
Progress Note    Patient: Artemio Kim MRN: [id number]  SSN: [id number]    YOB: 1935  Age: 80 y.o. Sex: male      Admission Date: 4/26/2018    LOS: 4 days     Subjective:     Patient has changed his mind and is now open to surgery. Has cardiac concerns, and we will ask their pre-op evaluation. Was seen today in presence of ID, discussed tentative plan for likely open right 2nd toe amputation on Thursday with patient, wife, daughter. Per Dr. Gerri Huerta consult note, \"lower extremity arterial ultrasound shows that the right superficial femoral artery is occluded. There is also some tibial artery PAD. The toe pressure on the right foot is 64 mmHg. \"    Objective:     Vitals:    04/29/18 2300 04/29/18 2328 04/30/18 0302 04/30/18 0717   BP: 91/59 91/59 (!) 79/52 (!) 87/48   Pulse: 79 79 82 80   Resp: 18 18 16 17   Temp: 98.6 °F (37 °C) 98.6 °F (37 °C) 98.2 °F (36.8 °C) 98.1 °F (36.7 °C)   SpO2: 100% 100% 94% 96%   Weight:   162 lb 3.2 oz (73.6 kg)         Physical Exam:   GENERAL: alert, cooperative, no distress  EYE: EOM intact, no nystagmus  LUNG: clear to auscultation bilaterally, normal respiratory effort  HEART: regular rate and rhythm  ABDOMEN: soft, nontender, nondistended, bowel sounds normoactive  EXTREMITIES: right 2nd toe with widened, soft, necrotic ulceration at tip with bone easily probed, mild erythema; right forefoot is soft, nontender with mild edema but without erythema  PULSES: radial and brachial palpable 2+ and symmetric bilaterally; right femoral pulse palpable but right pedal pulses non-palpable    Lab/Data Review:  BMP:   Lab Results   Component Value Date/Time     04/30/2018 05:44 AM    K 4.0 04/30/2018 05:44 AM     (H) 04/30/2018 05:44 AM    CO2 21 04/30/2018 05:44 AM    AGAP 13 04/30/2018 05:44 AM     (H) 04/30/2018 05:44 AM    BUN 39 (H) 04/30/2018 05:44 AM    CREA 2.63 (H) 04/30/2018 05:44 AM    GFRAA 30 (L) 04/30/2018 05:44 AM    GFRNA 25 (L) 04/30/2018 05:44 AM     CBC:   Lab Results   Component Value Date/Time    WBC 6.3 04/30/2018 05:44 AM    HGB 11.1 (L) 04/30/2018 05:44 AM    HCT 34.4 (L) 04/30/2018 05:44 AM     (L) 04/30/2018 05:44 AM        Imaging:  TITLE: Lower extremity arterial ultrasound examination, 4/27/2018  INDICATION: Right lower extremity claudication and vascular ulcers. Coronary  artery disease, diabetes mellitus.   TECHNIQUE: Grayscale, color, and Doppler interrogation performed.   COMPARISON: None.     SEGMENTAL BP:  Noncompressible right and left anterior tibial arteries. No  pressure measured in the left PTA.     REMY:  Right = 0.66             Left = not measurable     RIGHT LOWER EXTREMITY:  Peak systolic velocities-- CFA = 54, PFA = 94, proximal  SFA = 10 cm/sec. Occlusion of the mid SFA and distal SFA. Reconstitution of  the proximal popliteal artery with a velocity of 19 cm/s. Peroneal = 52, YOHANA =  30 cm/s. Posterior tibial artery is occluded.     LEFT LOWER EXTREMITY:  Peak systolic velocities-- CFA = 40, PFA = 61, proximal  SFA = 57, distal SFA = 15, popliteal = 83, PTA = 13, YOHANA = 33 cm/sec. Peroneal  artery is occluded.     ABDOMEN:  Aorta = 44 cm/s. IMPRESSION:  Right superficial femoral artery occlusion. Right posterior tibial artery occlusion. Left peroneal artery occlusion. Abnormal REMY. 3-PHASE BONE SCINTIGRAPHY, 4/28/2018  IMPRESSION:   SCINTIGRAPHIC FINDINGS SUSPICIOUS FOR OSTEOMYELITIS IN THE SECOND TOE, LIKELY IN  THE DISTAL PHALANX.     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 Signed By: Silvino Siegel PA-C    April 30, 2018      Physician Assistant with Vascular Surgery Cliff Rodriguez MD / [place]le [place].  Erasmo Morgan MD / Megha Riggins MD

## 2018-04-30 NOTE — PROGRESS NOTES
Problem: Mobility Impaired (Adult and Pediatric)  Goal: *Acute Goals and Plan of Care (Insert Text)  STG:  (1.)Mr. Félix Alanis will move from supine to sit and sit to supine , scoot up and down and roll side to side with MODIFIED INDEPENDENCE within 7 treatment day(s). (2.)Mr. Félix Alanis will transfer from bed to chair and chair to bed with MODIFIED INDEPENDENCE using the least restrictive device within 7 treatment day(s). (3.)Mr. Félix Alanis will ambulate with MODIFIED INDEPENDENCE for 1000 feet with the least restrictive device within 7 treatment day(s). (4.)Mr. Félix Alanis will ascend/descend with MODIFIED INDEPENDENCE with one hand rail within 7 treatment days. PHYSICAL THERAPY: Daily Note, Treatment Day: 1st, AM 4/30/2018  INPATIENT: Hospital Day: 5  Payor: Justin Fontanez / Plan: [employer] HU[employer]NA MEDICARE CHOICE PPO/PFFS / Product Type: Managed Care Medicare /      NAME/AGE/GENDER: Silvestre Simon is a 80 y.o. male   PRIMARY DIAGNOSIS: R necrotic foot infection  Diabetic ulcer of toe ([facility] [address] Cellulitis Cellulitis        ICD-10: Treatment Diagnosis:    · Generalized Muscle Weakness (M62.81)  · Difficulty in walking, Not elsewhere classified (R26.2)   Precaution/Allergies:  Review of patient's allergies indicates no known allergies. ASSESSMENT:     Mr. Félix Alanis is a pleasant 80 y.o. Male with primary diagnosis of R necrotic foot infection. Pt is sitting EOB, A&O x 4, and agreeable to PT treatment. Family present. Pt states he lives with his wife in single story home with 6 steps to enter with a handrail and walk in shower with grab bars. Pt states he is independent with  ADLs at baseline, and ambulates with cane for community distances for \"safety\" and no AD around house. Sit to stand with close supervision. Pt ambulated 250ft with RW and SBA, demonstrating slow, shuffling gait with decreased step clearance. Pt returned to sitting EOB with all needs met and within reach. Good session.   Progress demonstrated and making efforts towards goals. Pt will benefit from skilled therapy for duration of hospital stay to address decreased activity tolerance. No discharges needs anticipated at this time. Will continue PT efforts. This section established at most recent assessment   PROBLEM LIST (Impairments causing functional limitations):  1. Decreased ADL/Functional Activities  2. Decreased Transfer Abilities  3. Decreased Ambulation Ability/Technique  4. Decreased Balance  5. Decreased Activity Tolerance  6. Decreased Pacing Skills  7. Increased Fatigue  8. Decreased Mora with Home Exercise Program   INTERVENTIONS PLANNED: (Benefits and precautions of physical therapy have been discussed with the patient.)  1. Balance Exercise  2. Bed Mobility  3. Cold  4. Family Education  5. Gait Training  6. Home Exercise Program (HEP)  7. Manual Therapy  8. Neuromuscular Re-education/Strengthening  9. Range of Motion (ROM)  10. Therapeutic Activites  11. Therapeutic Exercise/Strengthening  12. Transfer Training  13. Group Therapy     TREATMENT PLAN: Frequency/Duration: 3 times a week for duration of hospital stay  Rehabilitation Potential For Stated Goals: Good     RECOMMENDED REHABILITATION/EQUIPMENT: (at time of discharge pending progress): Due to the probability of continued deficits (see above) this patient will not likely need continued skilled physical therapy after discharge. Equipment:    None at this time              HISTORY:   History of Present Injury/Illness (Reason for Referral):  Pleasant 79 y/o gentleman with hx DM, CAD, CHF, HTN, PAD presents as direct admission from office for infected right second toe. He does not recall having an injury but has poor sensation in his feet. For past two days he has noticed the right second toe become more red and swollen, now draining some pus. He thought it was just an ingrown toenail at first. He denies hx MRSA.  Will admit for IV antibiotics, wound care and further vascular workup.     10 systems reviewed and negative except as noted in HPI.no acute chest pain, f/c, SOB  Past Medical History/Comorbidities:   Mr. Lopez Rich  has a past medical history of Arrhythmia (2004); Atherosclerosis of native arteries of the extremities with intermittent claudication (9/16/2015); Automatic implantable cardiac defibrillator in situ (9/16/2015); CAD (coronary artery disease); Chronic systolic heart failure ([address] (9/16/2015); Common variable immunodeficiency ([address] (9/16/2015); Congestive heart failure, unspecified (9/16/2015); Diabetes ([address] (x 15 yrs); Diseases of tricuspid valve (9/16/2015); Essential hypertension, benign (9/16/2015); Heart failure ([address] Hypertension (x 1996); Occlusion and stenosis of carotid artery without mention of cerebral infarction (9/16/2015); Other chronic pulmonary heart diseases (9/16/2015); Other primary cardiomyopathies (9/16/2015); PAD (peripheral artery disease) ([address] Pain in limb (9/16/2015); Paroxysmal ventricular tachycardia ([address] (9/16/2015); Peripheral vascular disease, unspecified ([address] (9/16/2015); and Type II or unspecified type diabetes mellitus without mention of complication, not stated as uncontrolled (9/16/2015). He also has no past medical history of Arthritis; Asthma; Autoimmune disease ([address] Cancer ([address] Chronic kidney disease; Chronic obstructive pulmonary disease ([address] Difficult intubation; GERD (gastroesophageal reflux disease); Liver disease; Malignant hyperthermia due to anesthesia; Nausea & vomiting; Pseudocholinesterase deficiency; Psychiatric disorder; PUD (peptic ulcer disease); Seizures ([address] Sleep apnea; Stroke [facility]); Thromboembolus ([address] or Thyroid disease. Mr. Lopez Rich  has a past surgical history that includes hx pacemaker and hx hernia repair.   Social History/Living Environment:   Home Environment: Private residence  # Steps to Enter: 6  Rails to Enter: Yes  Hand Rails : Right  One/Two Story Residence: One story  Living Alone: No  Support Systems: Spouse/Significant Other/Partner  Patient Expects to be Discharged to[de-identified] Private residence  Current DME Used/Available at Home: Bridget Bicker, straight  Tub or Shower Type: Shower  Prior Level of Function/Work/Activity:  Independent, living with wife, driving, no falls, ambulates with cane for community distances   Number of Personal Factors/Comorbidities that affect the Plan of Care: 3+: HIGH COMPLEXITY   EXAMINATION:   Most Recent Physical Functioning:   Gross Assessment:                  Posture:     Balance:  Sitting: Intact  Standing: Intact Bed Mobility:     Wheelchair Mobility:     Transfers:  Sit to Stand: Stand-by assistance  Stand to Sit: Stand-by assistance  Gait:     Base of Support: Center of gravity altered  Speed/Maru: Shuffled; Slow  Distance (ft): 250 Feet (ft)  Assistive Device: Gait belt;Walker, rolling  Ambulation - Level of Assistance: Contact guard assistance      Body Structures Involved:  1. Heart  2. Lungs  3. Bones  4. Joints  5. Muscles  6. Ligaments Body Functions Affected:  1. Sensory/Pain  2. Cardio  3. Respiratory  4. Neuromusculoskeletal  5. Movement Related Activities and Participation Affected:  1. Mobility  2. Domestic Life  3. Interpersonal Interactions and Relationships  4. Community, Social and [place]   Number of elements that affect the Plan of Care: 4+: HIGH COMPLEXITY   CLINICAL PRESENTATION:   Presentation: Stable and uncomplicated: LOW COMPLEXITY   CLINICAL DECISION MAKIN [facility] Mobility Inpatient Short Form  How much difficulty does the patient currently have. .. Unable A Lot A Little None   1. Turning over in bed (including adjusting bedclothes, sheets and blankets)? [] 1   [] 2   [] 3   [x] 4   2. Sitting down on and standing up from a chair with arms ( e.g., wheelchair, bedside commode, etc.)   [] 1   [] 2   [] 3   [x] 4   3. Moving from lying on back to sitting on the side of the bed?    [] 1   [] 2   [] 3 [x] 4   How much help from another person does the patient currently need. .. Total A Lot A Little None   4. Moving to and from a bed to a chair (including a wheelchair)? [] 1   [] 2   [] 3   [x] 4   5. Need to walk in hospital room? [] 1   [] 2   [] 3   [x] 4   6. Climbing 3-5 steps with a railing? [] 1   [x] 2   [] 3   [] 4   © 2007, Trustees of [address] [place] [address] [address] [address], under license to [employer]. All rights reserved      Score:  Initial: 22 Most Recent: X (Date: -- )    Interpretation of Tool:  Represents activities that are increasingly more difficult (i.e. Bed mobility, Transfers, Gait). Score 24 23 22-20 19-15 14-10 9-7 6     Modifier CH CI CJ CK CL CM CN      ? Mobility - Walking and Moving Around:     - CURRENT STATUS: CJ - 20%-39% impaired, limited or restricted    - GOAL STATUS: CI - 1%-19% impaired, limited or restricted    - D/C STATUS:  ---------------To be determined---------------  Payor: HUMANA MEDICARE / Plan: [facility] HU[employer]NA MEDICARE CHOICE PPO/PFFS / Product Type: Managed Care Medicare /      Medical Necessity:     · Patient demonstrates good rehab potential due to higher previous functional level. Reason for Services/Other Comments:  · Patient continues to require skilled intervention due to decreased activity tolerance. Use of outcome tool(s) and clinical judgement create a POC that gives a: Clear prediction of patient's progress: LOW COMPLEXITY            TREATMENT:   (In addition to Assessment/Re-Assessment sessions the following treatments were rendered)   Pre-treatment Symptoms/Complaints:  \"OK\"  Pain: Initial:   Pain Intensity 1: 0  Post Session:  0/10     Therapeutic Activity: (    25 minutes): Therapeutic activities including functional mobility activities and gait traiing to improve mobility, strength, balance and coordination. Required contact guard assist    to promote static and dynamic balance in sitting.          Braces/Orthotics/Lines/Etc:   · IV  · Treatment/Session Assessment:    · Response to Treatment: Tolerated well  · Interdisciplinary Collaboration:   o Physical Therapy Assistant  o Registered Nurse  · After treatment position/precautions:   o Bed/Chair-wheels locked  o Bed in low position  o Call light within reach  o RN notified  o Family at bedside  o sitting EOB   · Compliance with Program/Exercises:  Compliant   · Recommendations/Intent for next treatment session: \"Next visit will focus on advancements to more challenging activities and reduction in assistance provided\".   Total Treatment Duration:  PT Patient Time In/Time Out  Time In: 1000  Time Out: 1    Neha Rothman-Ashutosh, PTA

## 2018-04-30 NOTE — CONSULTS
[facility] Cardiology Consult                Date of  Admission: 4/26/2018  6:20 PM     Primary Care Physician: Dr Manoj Almazan  Primary Cardiologist: Dr Meliton Gaines  Referring Physician: Dr Claudene Loach Physician: Dr Kemar Silva    CC/Reason for consult: ICM      Inessa Easely is a 80 y.o. male admitted for R necrotic foot infection  Diabetic ulcer of toe ([facility] [address] He has a h/o ICM w LHC in 2003 showing severe diffuse CAD, poor surgical candidate and no PCI targets requiring medical therapy. Echo 3-2018 showed EF 20-25% with WMA, dilated cardiomyopathy, mod MR, TR, and he has a h/o VT s/p [facility] BIV ICD. He has PAD, DM, and CKD. He was admitted 4-26 w infected R toe, started on antibiotics, seen by ID and surgery. He was found to have R SFA occlusion and severe tibial artery stenosis. He initially refused surgery but then agreed. He denies any CP, SOB, dizziness, palpitations, syncope- had LE edema but it is much improved. No ICD discharges. WBC 6.3, hgb 11.1, cr 2.63. CXR 4-29 showed RLL atelectasis. BP 87/48.        Patient Active Problem List   Diagnosis Code    Other chronic pulmonary heart diseases I27.89    Paroxysmal ventricular tachycardia (HCC) I47.2    Chronic systolic heart failure (HCC) I50.22    Occlusion and stenosis of carotid artery without mention of cerebral infarction I65.29    Automatic implantable cardiac defibrillator in situ Z95.810    Common variable immunodeficiency ([facility] [address] D83.9    Peripheral vascular disease, unspecified ([facility] [address] I73.9    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    Pain in limb M79.609    Other primary cardiomyopathies I42.8    Diseases of tricuspid valve I07.9    Type II or unspecified type diabetes mellitus without mention of complication, not stated as uncontrolled E11.9    Essential hypertension, benign I10    Congestive heart failure, unspecified I50.9    Cardiomyopathy, ischemic I25.5    Hyperlipidemia E78.5    Carotid stenosis, asymptomatic I65.29    Ventricular fibrillation ([facility]) I49.01    Cardiac arrest ([facility]) I46.9    Acute renal failure superimposed on stage 3 chronic kidney disease (HCC) N17.9, N18.3    Type 2 diabetes mellitus with nephropathy ([facility]) E11.21    Diabetic ulcer of toe ([facility]) E11.621, L97.509    Cellulitis L03.90    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 ([address] I70.261    CAD (coronary artery disease) I25.10       Past Medical History:   Diagnosis Date    Arrhythmia 2004    VT    Atherosclerosis of native arteries of the extremities with intermittent claudication 9/16/2015    Automatic implantable cardiac defibrillator in situ 9/16/2015    CAD (coronary artery disease)     inoperable, medical management only    Chronic systolic heart failure ([facility]) 9/16/2015    Common variable immunodeficiency ([facility]) 9/16/2015    Congestive heart failure, unspecified 9/16/2015    Diabetes ([address] x 15 yrs    type 2- sqbs am avg ---hypo at 52's    Diseases of tricuspid valve 9/16/2015    Essential hypertension, benign 9/16/2015    Heart failure ([facility])     EF 25-30%    Hypertension x 1996    controlled with meds    Occlusion and stenosis of carotid artery without mention of cerebral infarction 9/16/2015    Other chronic pulmonary heart diseases 9/16/2015    Other primary cardiomyopathies 9/16/2015    PAD (peripheral artery disease) ([facility])     carotid dz    Pain in limb 9/16/2015    Paroxysmal ventricular tachycardia ([address] 9/16/2015    Peripheral vascular disease, unspecified ([address] 9/16/2015    Type II or unspecified type diabetes mellitus without mention of complication, not stated as uncontrolled 9/16/2015      Past Surgical History:   Procedure Laterality Date    HX HERNIA REPAIR      HX PACEMAKER      defib placements x 2     No Known Allergies   Family History   Problem Relation Age of Onset    Diabetes Sister       Social History   Substance Use Topics    Smoking status: Never Smoker    Smokeless tobacco: Never Used    Alcohol use No        Current Facility-Administered Medications   Medication Dose Route Frequency    metroNIDAZOLE (FLAGYL) tablet 500 mg  500 mg Oral Q8H    cefTRIAXone (ROCEPHIN) 2 g in 0.9% sodium chloride (MBP/ADV) 50 mL  2 g IntraVENous Q24H    vancomycin (VANCOCIN) 1250 mg in  ml infusion  1,250 mg IntraVENous ONCE    [START ON 5/1/2018] vancomycin (VANCOCIN) 1,000 mg in 0.9% sodium chloride (MBP/ADV) 250 mL  1 g IntraVENous Q24H    0.9% sodium chloride infusion  75 mL/hr IntraVENous CONTINUOUS    alcohol 62% (NOZIN) nasal  1 Ampule  1 Ampule Topical Q12H    aspirin chewable tablet 81 mg  81 mg Oral DAILY    latanoprost (XALATAN) 0.005 % ophthalmic solution 1 Drop  1 Drop Both Eyes QHS    levothyroxine (SYNTHROID) tablet 25 mcg  25 mcg Oral ACB    amiodarone (CORDARONE) tablet 200 mg  200 mg Oral DAILY    atorvastatin (LIPITOR) tablet 40 mg  40 mg Oral QHS    clopidogrel (PLAVIX) tablet 75 mg  75 mg Oral DAILY    sodium chloride (NS) flush 5-10 mL  5-10 mL IntraVENous Q8H    sodium chloride (NS) flush 5-10 mL  5-10 mL IntraVENous PRN    dextrose 40% (GLUTOSE) oral gel 1 Tube  15 g Oral PRN    glucagon (GLUCAGEN) injection 1 mg  1 mg IntraMUSCular PRN    dextrose (D50W) injection syrg 12.5-25 g  25-50 mL IntraVENous PRN    insulin lispro (HUMALOG) injection   SubCUTAneous AC&HS    acetaminophen (TYLENOL) tablet 650 mg  650 mg Oral Q4H PRN    HYDROcodone-acetaminophen (NORCO) 5-325 mg per tablet 1 Tab  1 Tab Oral Q4H PRN    ondansetron (ZOFRAN) injection 4 mg  4 mg IntraVENous Q4H PRN    diphenhydrAMINE (BENADRYL) capsule 25 mg  25 mg Oral Q4H PRN    naloxone (NARCAN) injection 0.4 mg  0.4 mg IntraVENous PRN    magnesium hydroxide (MILK OF MAGNESIA) 400 mg/5 mL oral suspension 30 mL  30 mL Oral DAILY PRN    enoxaparin (LOVENOX) injection 30 mg  30 mg SubCUTAneous Q24H    dorzolamide (TRUSOPT) 2 % ophthalmic solution 1 Drop  1 Drop Both Eyes TID    polyethylene glycol (MIRALAX) packet 17 g  17 g Oral DAILY    bisacodyl (DULCOLAX) suppository 10 mg  10 mg Rectal DAILY PRN       Review of Symptoms:  General: no weight change,  no weakness, fever or chills  Skin: no rashes, lumps, or other skin changes  HEENT: no headache, dizziness, lightheadedness, vision changes, hearing changes, tinnitus, vertigo, sinus pressure/pain, bleeding gums, sore throat, or hoarseness  Neck: no swollen glands, goiter, pain or stiffness  Respiratory: no cough, sputum, hemoptysis, no dyspnea, wheezing  Cardiovascular: + as per HPI  Gastrointestinal: no GERD, constipation, diarrhea, liver problems, or h/o GI bleed  Urinary: no frequency, urgency , hematuria, burning/pain with urination, recent flank pain, polyuria, nocturia, or difficulty urinating  Peripheral Vascular: no claudication, leg cramps, prior DVTs, swelling of calves, legs, or feet, color change, or swelling with redness or tenderness  Musculoskeletal: no muscle or joint pain/stiffness, joint swelling, erythema of joints, or back pain  Psychiatric: no depression or excessive stress  Neurological: no sensory or motor loss, seizures, syncope, tremors, numbness, no dementia  Hematologic: no anemia, easy bruising or bleeding  Endocrine: + thyroid problems on synthroid, no heat or cold intolerance, excessive sweating, polyuria, polydipsia, +  diabetes.        Physical Exam  Vitals:    04/29/18 2300 04/29/18 2328 04/30/18 0302 04/30/18 0717   BP: 91/59 91/59 (!) 79/52 (!) 87/48   Pulse: 79 79 82 80   Resp: 18 18 16 17   Temp: 98.6 °F (37 °C) 98.6 °F (37 °C) 98.2 °F (36.8 °C) 98.1 °F (36.7 °C)   SpO2: 100% 100% 94% 96%   Weight:   73.6 kg (162 lb 3.2 oz)        Physical Exam:  General: Well Developed, Well Nourished, No Acute Distress, appears stated age  HEENT: pupils equal and round, no abnormalities noted  Neck: supple, no JVD, no carotid bruits  Heart: S1S2 with RRR but distant, ICD in place   Lungs: Clear throughout auscultation bilaterally without adventitious sounds  Abd: soft, nontender, nondistended, with good bowel sounds  Ext: warm, trace edema, toes wrapped   Skin: warm and dry  Psychiatric: Normal mood and affect  Neurologic: Alert and oriented X 3        Labs:   Recent Labs      04/30/18   0544  04/29/18   1911   NA  143  140   K  4.0  3.7   BUN  39*  42*   CREA  2.63*  2.78*   GLU  104*  133*   WBC  6.3  6.7   HGB  11.1*  11.4*   HCT  34.4*  35.4*   PLT  121*  134*        Assessment/Plan:     Assessment:   Cellulitis (4/27/2018)- Vanc, ceftriaxone, flagyl. Paroxysmal ventricular tachycardia- S/P [facility] ICD, on amiodarone. Chronic systolic heart failure - EF 20-25%, no BB/ACE-I/ARB due to hyoptension. Common variable immunodeficiency ([facility] [address] (9/16/2015)- Per primary. Type 2 diabetes mellitus with nephropathy ([facility] [address] (2/8/2018)- cont current meds. 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 Cont amiodarone, monitor volume status closely. CAD (coronary artery disease)- [facility] in 2003 showing severe diffuse CAD, poor surgical candidate and no PCI targets requiring medical therapy- ASA, plavix, statin. Thank you very much for this referral. We appreciate the opportunity to participate in this patient's care. We will follow along with above stated plan.     Lawrence Mckeon PA-C  Consulting MD: Eusebio Stover

## 2018-04-30 NOTE — PROGRESS NOTES
Hospitalist Progress Note    2018  Admit Date: 2018  6:20 PM   NAME: Marilou Merlin   :  1935   MRN:  [id number]   Attending: Neeraj Galicia MD  PCP:  Glinda Mcburney, DO    SUBJECTIVE:     Pt is a 81 yo male with pmh CHF EF 25%, pacer/ICD, DM 2 who presented to hospital  as direct admit from office after x rays revealed right 2nd toe/fore foot cellulitis. Pt cannot recall trauma to toe but admits to poor sensation to feet. He has noticed a wound to his toe for about a week or so and finally at the urgency of his wife went to his PCP to be evaluated. Bone scan reveals likely osteomyelitis of right second toe distal phalynx. Bone exposed at wound. Here he was started on Zosyn. He has no fever, leukocytosis. Additionally vascular studies reveal multiple LE arterial occlusion. He remains on ASA, Plavix. Pt initially refusing amputation which was vascular recommendation. He is now willing and is understanding of his risk for surgery due to CHF, CKD, age. BPs continue to run low, he is asymptomatic. Patient reports pain well controlled. LE edema stable. Review of Systems negative with exception of pertinent positives noted above  PHYSICAL EXAM     Visit Vitals    BP (!) 87/48    Pulse 80    Temp 98.1 °F (36.7 °C)    Resp 17    Wt 73.6 kg (162 lb 3.2 oz)    SpO2 96%    BMI 24.66 kg/m2      Temp (24hrs), Av.1 °F (36.7 °C), Min:97.6 °F (36.4 °C), Max:98.6 °F (37 °C)    Oxygen Therapy  O2 Sat (%): 96 % (18 0717)  O2 Device: Room air (18 0300)    Intake/Output Summary (Last 24 hours) at 18 0839  Last data filed at 18 0717   Gross per 24 hour   Intake              608 ml   Output              315 ml   Net              293 ml      General: No acute distress    Lungs:  CTA Bilaterally.    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 ([facility] 04/28/2018    Cellulitis 04/27/2018    Diabetic ulcer of toe ([facility] 04/26/2018    Type 2 diabetes mellitus with nephropathy ([facility] 02/08/2018    Chronic systolic heart failure ([facility] 09/16/2015    Common variable immunodeficiency ([facility] 09/16/2015    Peripheral vascular disease, unspecified ([facility] 09/16/2015    Essential hypertension, benign 09/16/2015     A/P:     Right 2nd toe cellulitis, distal phalanx osteo, exposed bone- Cont Zosyn, ID consult pending. No wound culture obtained. Vascular following, pt now willing for amputation. High risk for surgery but this has been discussed with pt. Consult PT/OT      LE arterial occlusions- Vascular following. Cont ASA, Plavix- defer to vascular what/when this needs to be held pre operatively. Hypotension- Chronic, asymptomatic. Gentle IV fluids, monitor closely with CKD and CHF. Chronic systolic HF- Lasix held due to hypotension. LE edema stable. CKD- Cr stable. Monitor. DC planning- SW following. Will likely need STR.        DVT Prophylaxis: Lovenox       Signed By: Jeison Hansen NP     April 30, 2018

## 2018-04-30 NOTE — CONSULTS
Infectious Disease Consult    Today's Date: 4/30/2018   Admit Date: 4/26/2018    Impression:   · Peripheral artery disease with multiple lower extremity arterial occlusions. · osteomyelitis of right 2nd toe  · CKD3  · DM 2; most recent hgb a1c = 5.2 in the setting of worsening CKD, so he was taken off of all diabetes treatment. Plan:   ·  The cellulitis is better with zosyn but these infections are often polymicrobial and the same organism causing cellulitis isn't always the same as what is causing osteomyelitis. · He now states that he is willing to have surgery and I think that an amputation of this toe is reasonable given his comorbidities and risks of long courses of antibiotics. This is provided that Dr. Moises Condon thinks that he has enough perfusion to promote surgical site healing. · Stop zosyn  · Dose vancomycin and add ceftriaxone and metronidazole  · I don't think that a culture at this time would be very beneficial and may lead to spurious results. Anti-infectives:     Inpat Anti-Infectives     Start     Ordered Stop    04/27/18 0200  piperacillin-tazobactam (ZOSYN) 3.375 g in 0.9% sodium chloride (MBP/ADV) 100 mL  3.375 g,   IntraVENous,   EVERY 8 HOURS      04/26/18 2001 --          Subjective:   Date of Consultation:  April 30, 2018  Referring Physician: Caridad Mendoza    Patient is a 80 y.o. male with DM2, CAD, PAD who was admitted on 4/26/18 after presenting from a physician office with an infected right second toe. He has peripheral neuropathy with poor sensation in his feet and was having no pain. No fever, chills. He reported approximately a month of progressive erythema, swelling and purulent drainage from that toe. He was admitted and started on zosyn. Blood cultures were performed and were negative. No cultures were done of the purulent drainage that was described. He has been afebrile since admission.   He has a pacemaker so a bone scan was done and demonstrated osteomyelitis likely involving the distal phalanx. Ultrasound demonstrated right superficial femoral and posterior tibial artery occlusions. He previously refused any surgery after it was recommended by Dr. Christine Villanueva; he is now willing to have the recommended revascularization and toe amputation. He has stage 4 CKD.         Patient Active Problem List   Diagnosis Code    Other chronic pulmonary heart diseases I27.89    Paroxysmal ventricular tachycardia (HCC) I47.2    Chronic systolic heart failure ([facility]) I50.22    Occlusion and stenosis of carotid artery without mention of cerebral infarction I65.29    Automatic implantable cardiac defibrillator in situ Z95.810    Common variable immunodeficiency ([facility] D83.9    Peripheral vascular disease, unspecified ([facility] I73.9    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    Pain in limb M79.609    Other primary cardiomyopathies I42.8    Diseases of tricuspid valve I07.9    Type II or unspecified type diabetes mellitus without mention of complication, not stated as uncontrolled E11.9    Essential hypertension, benign I10    Congestive heart failure, unspecified I50.9    Cardiomyopathy, ischemic I25.5    Hyperlipidemia E78.5    Carotid stenosis, asymptomatic I65.29    Ventricular fibrillation ([facility]) I49.01    Cardiac arrest ([facility] I46.9    Acute renal failure superimposed on stage 3 chronic kidney disease ([facility]) N17.9, N18.3    Type 2 diabetes mellitus with nephropathy ([facility] E11.21    Diabetic ulcer of toe ([facility]) E11.621, L97.509    Cellulitis L03.90    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 ([facility] I70.261     Past Medical History:   Diagnosis Date    Arrhythmia 2004    VT    Atherosclerosis of native arteries of the extremities with intermittent claudication 9/16/2015    Automatic implantable cardiac defibrillator in situ 9/16/2015    CAD (coronary artery disease)     inoperable, medical management only    Chronic systolic heart failure ([facility] 9/16/2015    Common variable immunodeficiency ([facility] 9/16/2015    Congestive heart failure, unspecified 9/16/2015    Diabetes ([facility] x 15 yrs    type 2- sqbs am avg ---hypo at 52's    Diseases of tricuspid valve 9/16/2015    Essential hypertension, benign 9/16/2015    Heart failure (HCC)     EF 25-30%    Hypertension x 1996    controlled with meds    Occlusion and stenosis of carotid artery without mention of cerebral infarction 9/16/2015    Other chronic pulmonary heart diseases 9/16/2015    Other primary cardiomyopathies 9/16/2015    PAD (peripheral artery disease) (HCC)     carotid dz    Pain in limb 9/16/2015    Paroxysmal ventricular tachycardia ([facility] 9/16/2015    Peripheral vascular disease, unspecified ([facility] 9/16/2015    Type II or unspecified type diabetes mellitus without mention of complication, not stated as uncontrolled 9/16/2015      Family History   Problem Relation Age of Onset    Diabetes Sister       Social History   Substance Use Topics    Smoking status: Never Smoker    Smokeless tobacco: Never Used    Alcohol use No     Past Surgical History:   Procedure Laterality Date    HX HERNIA REPAIR      HX PACEMAKER      defib placements x 2      Prior to Admission medications    Medication Sig Start Date End Date Taking? Authorizing Provider   furosemide (LASIX) 80 mg tablet Take 1 Tab by mouth daily. 4/4/18  Yes Roxana Gonzalez MD   brinzolamide (AZOPT) 1 % ophthalmic suspension Administer 1 Drop to both eyes two (2) times a day. Yes Historical Provider   amiodarone (CORDARONE) 200 mg tablet Take 1 Tab by mouth daily. 1/19/18  Yes Laure Jerry MD   atorvastatin (LIPITOR) 40 mg tablet Take 1 Tab by mouth nightly. 1/19/18  Yes Laure Jerry MD   clopidogrel (PLAVIX) 75 mg tab Take 1 Tab by mouth daily. 1/19/18  Yes Laure Jerry MD   Iron [id number] (MULTIGEN FOLIC) tab capsule Take 1 Tab by mouth daily.    Yes Historical Provider   levothyroxine (SYNTHROID) 25 mcg tablet Take  by mouth Daily (before breakfast). Yes Historical Provider   aspirin 81 mg chewable tablet Take 81 mg by mouth every morning. 11  Yes Historical Provider   latanoprost (XALATAN) 0.005 % ophthalmic solution Administer 1 Drop to both eyes nightly. Historical Provider       No Known Allergies     Review of Systems:  A comprehensive review of systems was negative except for that written in the History of Present Illness. Objective:     Visit Vitals    BP (!) 87/48    Pulse 80    Temp 98.1 °F (36.7 °C)    Resp 17    Wt 73.6 kg (162 lb 3.2 oz)    SpO2 96%    BMI 24.66 kg/m2     Temp (24hrs), Av.1 °F (36.7 °C), Min:97.6 °F (36.4 °C), Max:98.6 °F (37 °C)       Lines:  Peripheral IV:            Physical Exam:    General:  Alert, cooperative, well nourished, well developed, appears stated age   Eyes:  Sclera anicteric. Pupils equally round and reactive to light. Mouth/Throat: Mucous membranes normal, oral pharynx clear   Neck: Supple   Lungs:   Clear to auscultation bilaterally, good effort   CV:  Regular rate and rhythm,no murmur, click, rub or gallop; +ICD; No signs of cyanosis, but I cannot palpate pedal pulses in either foot. Abdomen:   Soft, non-tender. bowel sounds normal. non-distended   Extremities: No cyanosis or edema   Skin: Skin color, texture, turgor normal. no acute rash or lesions. The right second toe has improving cellulitis proximal to the toe in the distal forefoot. He still has a 2x2cm wound that is soft and moist on the distal right second toe and this palpates to the distal phalanx.    Lymph nodes: Cervical and supraclavicular normal   Musculoskeletal: No swelling or deformity   Lines/Devices:  Intact, no erythema, drainage or tenderness   Psych: Alert and oriented, normal mood affect given the setting       Data Review:     CBC:  Recent Labs      18   0544  18   1911  18   0602   WBC  6.3 6.7  7.1   GRANS  66  61   --    MONOS  6  8   --    EOS  3  4   --    ANEU  4.1  4.1   --    ABL  1.5  1.7   --    HGB  11.1*  11.4*  10.8*   HCT  34.4*  35.4*  34.3*   PLT  121*  134*  124*       BMP:  Recent Labs      04/30/18   0544  04/29/18   1911  04/29/18   0602   CREA  2.63*  2.78*  2.65*   BUN  39*  42*  40*   NA  143  140  141   K  4.0  3.7  3.9   CL  109*  104  105   CO2  21  25  23   AGAP  13  11  13   GLU  104*  133*  96       LFTS:  No results for input(s): TBILI, ALT, SGOT, AP, TP, ALB in the last 72 hours. Microbiology:     All Micro Results     Procedure Component Value Units Date/Time    CULTURE, BLOOD [[id number]] Collected:  04/26/18 2325    Order Status:  Completed Specimen:  Blood from Blood Updated:  04/30/18 0640     Special Requests: --        RIGHT  Antecubital       Culture result: NO GROWTH 3 DAYS       CULTURE, BLOOD [[id number]] Collected:  04/26/18 2325    Order Status:  Completed Specimen:  Blood from Blood Updated:  04/30/18 0640     Special Requests: --        LEFT  Antecubital       Culture result: NO GROWTH 3 DAYS       CULTURE, Tyree Peek STAIN [[id number]] Collected:  04/27/18 0915    Order Status:  Canceled Specimen:  Wound from Ulcer           Imaging:   LE arterial ultrasound (4/26/18): IMPRESSION:  Right superficial femoral artery occlusion. Right posterior tibial artery occlusion. Left peroneal artery occlusion    3 phase bone scan (4/27/18): IMPRESSION:  SCINTIGRAPHIC FINDINGS SUSPICIOUS FOR OSTEOMYELITIS IN THE SECOND TOE, LIKELY IN  THE DISTAL PHALANX. CXR (4/29/18)  IMPRESSION: Elevated right hemidiaphragm with linear atelectasis of the right lower lobe unchanged.     Signed By: Adán Jeffries MD     April 30, 2018

## 2018-04-30 NOTE — PROGRESS NOTES
Spoke to Mr. Elizabeth Chin and his wife Ms. Aleyda Paz, along with their daughter Gill Ridsandra in room 217 about discharge planning. They live in [facility]. Daughter is visiting from [place] (but also lives with her  in [facility] [place]) and will be able to stay with them for the near future. Right second toe amputation planned for Thursday May 3, 2018, and may need longer-term IV abx if osteomyelitis is found (and wound vac?). Discussed discharge planning, including STR at SNF versus home health RN and PT. Plan A is home with home health (and possibly home IV abx). Will follow and assist with discharge planning.

## 2018-04-30 NOTE — PROGRESS NOTES
END OF SHIFT NOTE:    INTAKE/OUTPUT  04/29 0701 - 04/30 0700  In: 608 [I.V.:608]  Out: 265 [Urine:265]  Voiding: YES  Catheter: NO  Drain:              Flatus: Patient does have flatus present. Stool:  0 occurrences. Characteristics:       Emesis: 0 occurrences. Characteristics:        VITAL SIGNS  Patient Vitals for the past 12 hrs:   Temp Pulse Resp BP SpO2   04/30/18 0302 98.2 °F (36.8 °C) 82 16 (!) 79/52 94 %   04/29/18 2328 98.6 °F (37 °C) 79 18 91/59 100 %   04/29/18 2300 98.6 °F (37 °C) 79 18 91/59 100 %   04/29/18 1934 97.9 °F (36.6 °C) 98 18 90/57 94 %   04/29/18 1727 97.6 °F (36.4 °C) 97 18 95/67 90 %       Pain Assessment  Pain Intensity 1: 0 (04/30/18 0204)        Patient Stated Pain Goal: 0    Ambulating  Yes    Shift report given to oncoming nurse at the bedside.     Edgardo Davis RN

## 2018-05-01 NOTE — DISCHARGE SUMMARY
Hospitalist Discharge Summary     Patient ID:  Alfonso Fine  [id number]  8 y.o.  1935  Admit date: 4/26/2018  6:20 PM  Discharge date and time: 5/1/2018  Attending: Alvino Panda MD  PCP:  Mando Reddy DO  Treatment Team: Attending Provider: Alvino Panda MD; Consulting Provider: Sandy Saeed MD; Consulting Provider: Lynda Andrews MD; Consulting Provider: Susan Kearney MD; Care Manager: Hue Chilel RN    Principal Diagnosis Cellulitis   Principal Problem:    Cellulitis (4/27/2018)    Active Problems:    Paroxysmal ventricular tachycardia ([address] (9/16/2015)      Chronic systolic heart failure ([address] (9/16/2015)      Common variable immunodeficiency ([address] (9/16/2015)      Peripheral vascular disease, unspecified ([address] (9/16/2015)      Essential hypertension, benign (9/16/2015)      Type 2 diabetes mellitus with nephropathy ([address] (2/8/2018)      Diabetic ulcer of toe ([address] (4/26/2018)      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 In summary, the patient is a 79 yo male with pmh CHF EF 25%, pacer/ICD, DM 2 who presented to hospital 04/26 as direct admit from office after x rays revealed right 2nd toe/fore foot cellulitis. Pt cannot recall trauma to toe but admits to poor sensation to feet. He has noticed a wound to his toe for about a week or so and finally at the urgency of his wife went to his PCP to be evaluated. Bone scan reveals likely osteomyelitis of right second toe distal phalynx. Bone exposed at wound. Here he was started on Zosyn. He has no fever, leukocytosis. Additionally vascular studies reveal multiple LE arterial occlusion. He remains on ASA but is Plavix will be on hold (last dose 05/01) for 2nd toe amputation planned for 05/07.   ID following with recommendations for Rocephin, Vanc, and PO Flagyl until 05/10, with follow up appt 05/11. Pt is stable for d/c home. He has been seen by vascular, cards, and ID who are all in agreement with plan. Off Plavix until 05/07 for outpatient toe amputation. Additionally pts lasix has been held for hypotension. Significant Diagnostic Studies:     Bone Scan IMPRESSION:     SCINTIGRAPHIC FINDINGS SUSPICIOUS FOR OSTEOMYELITIS IN THE SECOND TOE, LIKELY IN  THE DISTAL PHALANX. LE Vascular Duplex     IMPRESSION:  Right superficial femoral artery occlusion. Right posterior tibial artery occlusion. Left peroneal artery occlusion      Labs: Results:       Chemistry Recent Labs      05/01/18   0608  04/30/18   0544  04/29/18 1911   GLU  110*  104*  133*   NA  141  143  140   K  4.2  4.0  3.7   CL  107  109*  104   CO2  21  21  25   BUN  41*  39*  42*   CREA  2.71*  2.63*  2.78*   CA  8.5  8.3  8.7   AGAP  13  13  11      CBC w/Diff Recent Labs      05/01/18   0608 04/30/18   0544  04/29/18 1911   WBC  7.4  6.3  6.7   RBC  3.63*  3.65*  3.74*   HGB  11.0*  11.1*  11.4*   HCT  34.6*  34.4*  35.4*   PLT  121*  121*  134*   GRANS  72  66  61   LYMPH  18  24  26   EOS  1  3  4      Cardiac Enzymes No results for input(s): CPK, CKND1, JAMES in the last 72 hours. No lab exists for component: CKRMB, TROIP   Coagulation No results for input(s): PTP, INR, APTT in the last 72 hours. No lab exists for component: INREXT    Lipid Panel No results found for: CHOL, CHOLPOCT, CHOLX, CHLST, CHOLV, 923222, HDL, LDL, LDLC, DLDLP, 827530, VLDLC, VLDL, TGLX, TRIGL, TRIGP, TGLPOCT, CHHD, CHHDX   BNP No results for input(s): BNPP in the last 72 hours. Liver Enzymes No results for input(s): TP, ALB, TBIL, AP, SGOT, GPT in the last 72 hours.     No lab exists for component: DBIL   Thyroid Studies No results found for: T4, T3U, TSH, TSHEXT         Discharge Exam:  Visit Vitals    BP 92/55    Pulse 82    Temp 97.9 °F (36.6 °C)    Resp 17    Wt 74.9 kg (165 lb 3.2 oz)  SpO2 95%    BMI 25.12 kg/m2     General appearance: alert, cooperative, no distress, appears stated age  Lungs: clear to auscultation bilaterally  Heart: regular rate and rhythm, S1, S2 normal, no murmur, click, rub or gallop  Abdomen: soft, non-tender. Bowel sounds normal. No masses,  no organomegaly  Extremities: no cyanosis or edema, right 2nd toe wound  Neurologic: Grossly normal    Disposition: Home with home health   Discharge Condition: stable  Patient Instructions:   Current Discharge Medication List      START taking these medications    Details   cefTRIAXone 2 gram 2 g, ADDaptor 1 Device IVPB 2 g by IntraVENous route every twenty-four (24) hours for 8 days. Qty: 1 Dose, Refills: 9      metroNIDAZOLE (FLAGYL) 500 mg tablet Take 1 Tab by mouth every eight (8) hours for 9 days. Qty: 27 Tab, Refills: 0      vancomycin 1,000 mg 1,000 mg, ADDaptor 1 Device IVPB 1,000 mg by IntraVENous route every twenty-four (24) hours for 9 days. Qty: 1 Dose, Refills: 9         CONTINUE these medications which have NOT CHANGED    Details   brinzolamide (AZOPT) 1 % ophthalmic suspension Administer 1 Drop to both eyes two (2) times a day. amiodarone (CORDARONE) 200 mg tablet Take 1 Tab by mouth daily. Qty: 90 Tab, Refills: 3    Associated Diagnoses: Paroxysmal ventricular tachycardia (HCC)      atorvastatin (LIPITOR) 40 mg tablet Take 1 Tab by mouth nightly. Qty: 90 Tab, Refills: 3    Associated Diagnoses: Hyperlipidemia, unspecified hyperlipidemia type      Iron [id number] (MULTIGEN FOLIC) tab capsule Take 1 Tab by mouth daily. levothyroxine (SYNTHROID) 25 mcg tablet Take  by mouth Daily (before breakfast). aspirin 81 mg chewable tablet Take 81 mg by mouth every morning. latanoprost (XALATAN) 0.005 % ophthalmic solution Administer 1 Drop to both eyes nightly.          STOP taking these medications       furosemide (LASIX) 80 mg tablet Comments:   Reason for Stopping: clopidogrel (PLAVIX) 75 mg tab Comments:   Reason for Stopping:               Activity:Regular, as tolerated  Diet: DIET DIABETIC CONSISTENT CARB Regular  Wound Care: daily dsg changes    Follow-up      Vascular surgery planned for 05/07   ID appt 05/11    Follow-up Information     Follow up With Details Comments [address], [address] [place] [phone]              Time spent to discharge patient greater than 30 minutes  Signed:  Flavia Menjivar NP  5/1/2018  12:20 PM

## 2018-05-01 NOTE — ROUTINE PROCESS
END OF SHIFT NOTE:    INTAKE/OUTPUT  04/30 0701 - 05/01 0700  In: 1469 [P.O.:360; I.V.:1109]  Out: 350 [Urine:350]  Voiding: YES  Catheter: NO  Drain:              Flatus: Patient does have flatus present. Stool:  1 occurrences. Characteristics:  Stool Assessment  Stool Color: Brown  Stool Appearance: Loose, Watery  Stool Amount: Small  Stool Source/Status: Rectum    Emesis: 0 occurrences. Characteristics:        VITAL SIGNS  Patient Vitals for the past 12 hrs:   Temp Pulse Resp BP SpO2   05/01/18 0300 98.1 °F (36.7 °C) 81 17 102/64 98 %   04/30/18 2300 98.7 °F (37.1 °C) 78 17 92/56 94 %       Pain Assessment  Pain Intensity 1: 0 (04/30/18 1932)        Patient Stated Pain Goal: 0    Ambulating  Yes    Shift report given to oncoming nurse at the bedside.     Danial Bucio LPN

## 2018-05-01 NOTE — PROGRESS NOTES
Progress Note    Patient: Rosanne Meredith MRN: [id number]  SSN: [id number]    YOB: 1935  Age: 80 y.o. Sex: male      Admission Date: 4/26/2018    LOS: 5 days       Subjective:     Patient to be discharged and return for outpatient toe amputation next week, Cardiology okay with holding Plavix 5d prior. Discussed with patient, wife, daughter.     Objective:     Vitals:    04/30/18 2300 05/01/18 0300 05/01/18 0449 05/01/18 0746   BP: 92/56 102/64  92/55   Pulse: 78 81  82   Resp: 17 17  17   Temp: 98.7 °F (37.1 °C) 98.1 °F (36.7 °C)  97.9 °F (36.6 °C)   SpO2: 94% 98%  95%   Weight:   165 lb 3.2 oz (74.9 kg)         Physical Exam:   GENERAL: alert, cooperative, mildly anxious but not distressed  LUNG: normal respiratory effort  EXTREMITIES: warm, normal ROM; right foot bandage in place, C/D/I    Lab/Data Review:  BMP:   Lab Results   Component Value Date/Time     05/01/2018 06:08 AM    K 4.2 05/01/2018 06:08 AM     05/01/2018 06:08 AM    CO2 21 05/01/2018 06:08 AM    AGAP 13 05/01/2018 06:08 AM     (H) 05/01/2018 06:08 AM    BUN 41 (H) 05/01/2018 06:08 AM    CREA 2.71 (H) 05/01/2018 06:08 AM    GFRAA 29 (L) 05/01/2018 06:08 AM    GFRNA 24 (L) 05/01/2018 06:08 AM     CBC:   Lab Results   Component Value Date/Time    WBC 7.4 05/01/2018 06:08 AM    HGB 11.0 (L) 05/01/2018 06:08 AM    HCT 34.6 (L) 05/01/2018 06:08 AM     (L) 05/01/2018 06:08 AM       Assessment / Plan:     Principal Problem:    Cellulitis (4/27/2018)    Active Problems:    Paroxysmal ventricular tachycardia (HCC) (9/16/2015)      Chronic systolic heart failure (HCC) (9/16/2015)      Common variable immunodeficiency (HCC) (9/16/2015)      Peripheral vascular disease, unspecified ([facility] [address] (9/16/2015)      Essential hypertension, benign (9/16/2015)      Type 2 diabetes mellitus with nephropathy ([facility] [address] (2/8/2018)      Diabetic ulcer of toe ([facility] [address] (4/26/2018)      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 Wil Brown next week as outpatient      Signed By: Olivier Gavin PA-C    May 1, 2018      Physician Assistant with Vascular Surgery Tamiko Clark MD / Jericho Ackerman.  Chelsea Armendariz MD / Mimi Soliman MD

## 2018-05-01 NOTE — PROGRESS NOTES
Dispo update:      1. Vanco and rocephin already dosed today, so home with PICC. 2.  [facility] ([phone]) Ms. Ponce Guaman RN providing education and home IV vanco and rocephin. 3.  [facility] RN and PT ordered, with start of care [facility]) for RN tomorrow around noon time. [address] [place] office at [phone] and faxed face sheet, clinicals, and home health orders to fax [phone].  4.  [facility], right second toe amputation scheduled for 1:25 pm as an outpatient on Monday May 7, 2018. 5.  Mr. Campa January, his wife, and his daughter, as well as LOIDA Mercado are in agreement with this plan.

## 2018-05-01 NOTE — PROGRESS NOTES
Infectious Disease Progress Note    Today's Date: 2018   Admit Date: 2018    Impression:   · Peripheral artery disease with multiple lower extremity arterial occlusions. · osteomyelitis of right 2nd toe  · CKD3; never been evaluated by Nephrology  · DM 2; most recent hgb a1c = 5.2 in the setting of worsening CKD, so he was taken off of all diabetes treatment. Plan:   · Continue Vancomycin, Ceftriaxone, and Metronidazole  · Await operative intervention (2nd toe amputation) with Dr. Arsh Frankel planned for next week in outpatient setting  · Blood cultures are NGTD X 2  · Place PICC line today; may need tunneled given renal disease  ·   · ID OPAT Orders:  · Vancomycin 1 gm IV Q 24 hrs with EOT ~5/10/18  · Rocephin 2 gm IV Q 24 hrs with EOT ~5/10/18  · PO Flagyl 500 mg TID  · Q Monday CBC with diff, ESR, CRP, Scr, LFT's, and Vancomycin trough level  · Q Thursday Scr, and Vancomycin trough level  · Please fax results to [phone]  · ID office follow-up is scheduled for 18 @ 7139         Anti-infectives:   Vancomycin  Flagyl  Rocephin    Subjective:     No N/V/D. No fever or chills. Tolerating therapy w/o difficulty. No Known Allergies     Review of Systems:  A comprehensive review of systems was negative except for that written in the History of Present Illness. Objective:     Visit Vitals    BP 92/55    Pulse 82    Temp 97.9 °F (36.6 °C)    Resp 17    Wt 74.9 kg (165 lb 3.2 oz)    SpO2 95%    BMI 25.12 kg/m2     Temp (24hrs), Av.1 °F (36.7 °C), Min:97.7 °F (36.5 °C), Max:98.7 °F (37.1 °C)       Lines:  Peripheral IV:            Physical Exam:    General:  Alert, cooperative, well nourished, well developed, appears stated age   Eyes:  Sclera anicteric. Pupils equally round and reactive to light.    Mouth/Throat: Mucous membranes normal, oral pharynx clear   Neck: Supple   Lungs:   Clear to auscultation bilaterally, good effort   CV:  Regular rate and rhythm,no murmur, click, rub or gallop; +ICD; No signs of cyanosis, but I cannot palpate pedal pulses in either foot. Abdomen:   Soft, non-tender. bowel sounds normal. non-distended   Extremities: No cyanosis or edema   Skin: Skin color, texture, turgor normal. no acute rash or lesions. The right second toe has improving cellulitis proximal to the toe in the distal forefoot. He still has a 2x2cm wound that is soft and moist on the distal right second toe and this palpates to the distal phalanx. Lymph nodes: Cervical and supraclavicular normal   Musculoskeletal: No swelling or deformity   Lines/Devices:  Intact, no erythema, drainage or tenderness   Psych: Alert and oriented, normal mood affect given the setting       Data Review:     CBC:  Recent Labs      05/01/18   0608  04/30/18   0544 04/29/18 1911   WBC  7.4  6.3  6.7   GRANS  72  66  61   MONOS  8  6  8   EOS  1  3  4   ANEU  5.4  4.1  4.1   ABL  1.3  1.5  1.7   HGB  11.0*  11.1*  11.4*   HCT  34.6*  34.4*  35.4*   PLT  121*  121*  134*       BMP:  Recent Labs      05/01/18   0608  04/30/18   0544  04/29/18 1911   CREA  2.71*  2.63*  2.78*   BUN  41*  39*  42*   NA  141  143  140   K  4.2  4.0  3.7   CL  107  109*  104   CO2  21  21  25   AGAP  13  13  11   GLU  110*  104*  133*       LFTS:  No results for input(s): TBILI, ALT, SGOT, AP, TP, ALB in the last 72 hours.     Microbiology:     All Micro Results     Procedure Component Value Units Date/Time    CULTURE, BLOOD [[id number]] Collected:  04/26/18 2325    Order Status:  Completed Specimen:  Blood from Blood Updated:  05/01/18 0711     Special Requests: --        RIGHT  Antecubital       Culture result: NO GROWTH 4 DAYS       CULTURE, BLOOD [[id number]] Collected:  04/26/18 2325    Order Status:  Completed Specimen:  Blood from Blood Updated:  05/01/18 0711     Special Requests: --        LEFT  Antecubital       Culture result: NO GROWTH 4 DAYS       CULTURE, [place] STAIN [[id number]] Collected:  04/27/18 0915    Order Status: Canceled Specimen:  Wound from Ulcer           Imaging:   LE arterial ultrasound (4/26/18): IMPRESSION:  Right superficial femoral artery occlusion. Right posterior tibial artery occlusion. Left peroneal artery occlusion    3 phase bone scan (4/27/18): IMPRESSION:  SCINTIGRAPHIC FINDINGS SUSPICIOUS FOR OSTEOMYELITIS IN THE SECOND TOE, LIKELY IN  THE DISTAL PHALANX. CXR (4/29/18)  IMPRESSION: Elevated right hemidiaphragm with linear atelectasis of the right lower lobe unchanged.     Signed By: Meryle Solders, NP     May 1, 2018

## 2018-05-01 NOTE — PROGRESS NOTES
Problem: Pressure Injury - Risk of  Goal: *Prevention of pressure injury  Document Ignacio Scale and appropriate interventions in the flowsheet. Outcome: Progressing Towards Goal  Pressure Injury Interventions:  Sensory Interventions: Float heels, Pressure redistribution bed/mattress (bed type), Turn and reposition approx. every two hours (pillows and wedges if needed)    Moisture Interventions: Apply protective barrier, creams and emollients    Activity Interventions: Increase time out of bed, Pressure redistribution bed/mattress(bed type), PT/OT evaluation    Mobility Interventions: Float heels, HOB 30 degrees or less, Pressure redistribution bed/mattress (bed type), PT/OT evaluation    Nutrition Interventions: Document food/fluid/supplement intake    Friction and Shear Interventions: HOB 30 degrees or less, Lift sheet               Problem: Falls - Risk of  Goal: *Absence of Falls  Document Leila Fall Risk and appropriate interventions in the flowsheet.    Outcome: Progressing Towards Goal  Fall Risk Interventions:  Mobility Interventions: Communicate number of staff needed for ambulation/transfer, OT consult for ADLs, Patient to call before getting OOB         Medication Interventions: Patient to call before getting OOB, Teach patient to arise slowly    Elimination Interventions: Call light in reach, Patient to call for help with toileting needs, Toileting schedule/hourly rounds, Urinal in reach

## 2018-05-01 NOTE — PROGRESS NOTES
PICC Placement Note    PRE-PROCEDURE VERIFICATION  Correct Procedure: yes. Time out completed with assistant Lashae Wilkinson rn and all persons present in agreement with time out. Correct Site:  yes  Temperature: Temp: 97.9 °F (36.6 °C), Temperature Source: Temp Source: Oral  Recent Labs      05/01/18   0608   BUN  41*   CREA  2.71*   PLT  121*   WBC  7.4     Allergies: Review of patient's allergies indicates no known allergies. Education materials for [facility] given to patient or family. PROCEDURE DETAIL  A double lumen PICC line was started for antibiotic therapy. The following documentation is in addition to the PICC properties in the lines/airways flowsheet :  Lot #: [id number]  xylocaine used: yes  Mid-Arm Circumference: 28 (cm)  Internal Catheter Length: 40 (cm)  Internal Catheter Total Length: 40 (cm)  Vein Selection for PICC:right basilic  Central Line Bundle followed yes  Complication Related to Insertion: none  Both the insertion guidewire and sherlock guidewire were removed intact all ports have positive blood return and were flush well with normal saline. The placement was verified by xray. The xray results state the tip overlies the lower superior vena cava.          Line is okay to use: yes    Rosanna Bowman RN

## 2018-05-01 NOTE — PROGRESS NOTES
Pt's D/C instructions completed. Verbalized understanding of all instructions including diet, activity, s/sx to alert MD, medications, wound care, and f/u appointment. Family at [facility].

## 2018-05-01 NOTE — DISCHARGE INSTRUCTIONS
ID appt 05/11  Vascular surgery planned 05/07         Activity:Regular, as tolerated  Diet: DIET DIABETIC CONSISTENT CARB Regular  Wound Care: daily dsg changes         DISCHARGE SUMMARY from Nurse    PATIENT INSTRUCTIONS:    After general anesthesia or intravenous sedation, for 24 hours or while taking prescription Narcotics:  · Limit your activities  · Do not drive and operate hazardous machinery  · Do not make important personal or business decisions  · Do  not drink alcoholic beverages  · If you have not urinated within 8 hours after discharge, please contact your surgeon on call. Report the following to your surgeon:  · Excessive pain, swelling, redness or odor of or around the surgical area  · Temperature over 100.5  · Nausea and vomiting lasting longer than 4 hours or if unable to take medications  · Any signs of decreased circulation or nerve impairment to extremity: change in color, persistent  numbness, tingling, coldness or increase pain  · Any questions    What to do at Home:  Recommended activity: Activity as tolerated, per MD instructions    If you experience any of the following symptoms fever > 100.5, nausea, vomiting, pain, chest pain and/or shortness of breath please follow up with MD.    *  Please give a list of your current medications to your Primary Care Provider. *  Please update this list whenever your medications are discontinued, doses are      changed, or new medications (including over-the-counter products) are added. *  Please carry medication information at all times in case of emergency situations. These are general instructions for a healthy lifestyle:    No smoking/ No tobacco products/ Avoid exposure to second hand smoke  Surgeon General's Warning:  Quitting smoking now greatly reduces serious risk to your health.     Obesity, smoking, and sedentary lifestyle greatly increases your risk for illness    A healthy diet, regular physical exercise & weight monitoring are important for maintaining a healthy lifestyle    You may be retaining fluid if you have a history of heart failure or if you experience any of the following symptoms:  Weight gain of 3 pounds or more overnight or 5 pounds in a week, increased swelling in our hands or feet or shortness of breath while lying flat in bed. Please call your doctor as soon as you notice any of these symptoms; do not wait until your next office visit. Recognize signs and symptoms of STROKE:    F-face looks uneven    A-arms unable to move or move unevenly    S-speech slurred or non-existent    T-time-call 911 as soon as signs and symptoms begin-DO NOT go       Back to bed or wait to see if you get better-TIME IS BRAIN. Warning Signs of HEART ATTACK     Call 911 if you have these symptoms:   Chest discomfort. Most heart attacks involve discomfort in the center of the chest that lasts more than a few minutes, or that goes away and comes back. It can feel like uncomfortable pressure, squeezing, fullness, or pain.  Discomfort in other areas of the upper body. Symptoms can include pain or discomfort in one or both arms, the back, neck, jaw, or stomach.  Shortness of breath with or without chest discomfort.  Other signs may include breaking out in a cold sweat, nausea, or lightheadedness. Don't wait more than five minutes to call 911 - MINUTES MATTER! Fast action can save your life. Calling 911 is almost always the fastest way to get lifesaving treatment. Emergency Medical Services staff can begin treatment when they arrive -- up to an hour sooner than if someone gets to the hospital by car. The discharge information has been reviewed with the patient. The patient verbalized understanding.   Discharge medications reviewed with the patient and appropriate educational materials and side effects teaching were provided. ___________________________________________________________________________________________________________________________________           Cellulitis: Care Instructions  Your Care Instructions    Cellulitis is a skin infection. It often occurs after a break in the skin from a scrape, cut, bite, or puncture, or after a rash. The doctor has checked you carefully, but problems can develop later. If you notice any problems or new symptoms, get medical treatment right away. Follow-up care is a key part of your treatment and safety. Be sure to make and go to all appointments, and call your doctor if you are having problems. It's also a good idea to know your test results and keep a list of the medicines you take. How can you care for yourself at home? · Take your antibiotics as directed. Do not stop taking them just because you feel better. You need to take the full course of antibiotics. · Prop up the infected area on pillows to reduce pain and swelling. Try to keep the area above the level of your heart as often as you can. · If your doctor told you how to care for your wound, follow your doctor's instructions. If you did not get instructions, follow this general advice:  ¨ Wash the wound with clean water 2 times a day. Don't use hydrogen peroxide or alcohol, which can slow healing. ¨ You may cover the wound with a thin layer of petroleum jelly, such as Vaseline, and a nonstick bandage. ¨ Apply more petroleum jelly and replace the bandage as needed. · Be safe with medicines. Take pain medicines exactly as directed. ¨ If the doctor gave you a prescription medicine for pain, take it as prescribed. ¨ If you are not taking a prescription pain medicine, ask your doctor if you can take an over-the-counter medicine. To prevent cellulitis in the future  · Try to prevent cuts, scrapes, or other injuries to your skin. Cellulitis most often occurs where there is a break in the skin.   · If you get a scrape, cut, mild burn, or bite, wash the wound with clean water as soon as you can to help avoid infection. Don't use hydrogen peroxide or alcohol, which can slow healing. · If you have swelling in your legs (edema), support stockings and good skin care may help prevent leg sores and cellulitis. · Take care of your feet, especially if you have diabetes or other conditions that increase the risk of infection. Wear shoes and socks. Do not go barefoot. If you have athlete's foot or other skin problems on your feet, talk to your doctor about how to treat them. When should you call for help? Call your doctor now or seek immediate medical care if:  ? · You have signs that your infection is getting worse, such as:  ¨ Increased pain, swelling, warmth, or redness. ¨ Red streaks leading from the area. ¨ Pus draining from the area. ¨ A fever. ? · You get a rash. ? Watch closely for changes in your health, and be sure to contact your doctor if:  ? · You are not getting better after 1 day (24 hours). ? · You do not get better as expected. Where can you learn more? Go to http://jessie-anne.info/. Merline Foil in the search box to learn more about \"Cellulitis: Care Instructions. \"  Current as of: October 13, 2016  Content Version: 11.4  © 1989-9049 [employer]. Care instructions adapted under license by [facility] (which disclaims liability or warranty for this information). If you have questions about a medical condition or this instruction, always ask your healthcare professional. [place] [address] any warranty or liability for your use of this information.

## 2018-05-03 PROBLEM — N17.9 AKI (ACUTE KIDNEY INJURY) (HCC): Status: ACTIVE | Noted: 2018-01-01

## 2018-05-03 NOTE — PROGRESS NOTES
Pt received to floor via stretcher from the ED. Telephone report received from [place], [facility]. Dual skin assessment completed with Jt Plummer RN. Pt has diffuse bruising to BUE and across lower abdomen. Skin tear to rt hand- dressing applied by ED RN is C/D/I. Pt has bandaid over small skin tear on lt elbow. Wound noted to rt 2nd toe- dressing C/D/I. Amputation is planned for this toe. Blanchable redness to sacrum, allevyn applied. RUE PICC dressing C/D/I; date of last dressing change is 5/1/18. Family at bedside, instructed pt how to use call bell. No needs at present. Bedside shift report given to Shakira Morley (oncoming RN).

## 2018-05-03 NOTE — ED PROVIDER NOTES
HPI Comments: 80year old gentleman presents with concerns about not feeling well and her blood pressure. Home health came to see the patient today to help deliver to IV antibiotics through a right arm PICC line. They checked his blood pressure and found it to be very well and he thought the patient was very dehydrated to encourage the family to bring him to the emergency department. Patient is on those antibiotics due to osteomyelitis in his foot. He is scheduled to have a partial amputation Monday. Patient says he's had no fevers or vomiting and he said no diarrhea, cough, congestion, or chills. Family hasn't been eating or drinking very much and the patient says he just feels weak. Additionally, familyat the patient fell last night and may have hit his head. He has an abrasion on the bridge of his nose anything to potentially he may have hit a piece of furniture. They do not think any loss of consciousness but they are not certain. Triage plan: I will check his basic blood work, given a liter bolus of fluids, and I'll also get a head CT scan. Patient was seen in triage, a brief history and physical was done. Labs and/or other studies were ordered pending placement of patient in the back. Jennifer Degroot. Edwardo Yeboah MD    Elements of this note were created using speech recognition software. As such, errors of speech recognition may be present. Patient is a 80 y.o. male presenting with dehydration. The history is provided by the patient. Dehydration   Pertinent negatives include no chest pain, no abdominal pain, no headaches and no shortness of breath.         Past Medical History:   Diagnosis Date    Arrhythmia 2004    VT    Atherosclerosis of native arteries of the extremities with intermittent claudication 9/16/2015    Automatic implantable cardiac defibrillator in situ 9/16/2015    CAD (coronary artery disease)     inoperable, medical management only    Chronic systolic heart failure ([facility] [address] 9/16/2015    Common variable immunodeficiency ([facility] [address] 9/16/2015    Congestive heart failure, unspecified 9/16/2015    Diabetes ([facility] x 15 yrs    type 2- sqbs am avg ---hypo at 52's    Diseases of tricuspid valve 9/16/2015    Essential hypertension, benign 9/16/2015    Heart failure (HCC)     EF 25-30%    Hypertension x 1996    controlled with meds    Occlusion and stenosis of carotid artery without mention of cerebral infarction 9/16/2015    Other chronic pulmonary heart diseases 9/16/2015    Other primary cardiomyopathies 9/16/2015    PAD (peripheral artery disease) (HCC)     carotid dz    Pain in limb 9/16/2015    [facility]oxys[facility]cardia [facility] 9/16/2015    Peripheral vascular disease, unspecified ([facility] 9/16/2015    Type II or unspecified type diabetes mellitus without mention of complication, not stated as uncontrolled 9/16/2015       Past Surgical History:   Procedure Laterality Date    HX HERNIA REPAIR      HX PACEMAKER      defib placements x 2         Family History:   Problem Relation Age of Onset    Diabetes Sister        Social History     Social History    Marital status:      Spouse name: N/A    Number of children: N/A    Years of education: N/A     Occupational History    Not on file. Social History Main Topics    Smoking status: Never Smoker    Smokeless tobacco: Never Used    Alcohol use No    Drug use: No    Sexual activity: Not on file     Other Topics Concern    Not on file     Social History Narrative         ALLERGIES: Review of patient's allergies indicates no known allergies. Review of Systems   Constitutional: Positive for activity change, appetite change and fatigue. Negative for chills, diaphoresis and fever. HENT: Negative for congestion, rhinorrhea and sore throat. Nnasal abrasion   Eyes: Negative for redness and visual disturbance. Respiratory: Negative for cough, chest tightness, shortness of breath and wheezing. Cardiovascular: Negative for chest pain and palpitations. Gastrointestinal: Negative for abdominal pain, blood in stool, diarrhea, nausea and vomiting. Endocrine: Negative for polydipsia and polyuria. Genitourinary: Negative for dysuria and hematuria. Musculoskeletal: Negative for arthralgias, myalgias and neck stiffness. Skin: Positive for wound. Negative for rash. Allergic/Immunologic: Negative for environmental allergies and food allergies. Neurological: Negative for dizziness, weakness and headaches. Hematological: Negative for adenopathy. Does not bruise/bleed easily. Psychiatric/Behavioral: Negative for confusion and sleep disturbance. The patient is not nervous/anxious. Vitals:    05/03/18 1312 05/03/18 1316 05/03/18 1427 05/03/18 1537   BP: 96/55 99/55 106/58 96/57   Pulse:   80 80   Resp:       Temp:       SpO2: 97% 96% 97% 95%   Weight:       Height:                Physical Exam   Constitutional: He is oriented to person, place, and time. He appears well-developed and well-nourished. Frail elderly-appearing gentleman who looks older than his stated age   HENT:   Minor abrasion to the bridge of his nose   Eyes: Right eye exhibits no discharge. Left eye exhibits no discharge. Neck: No JVD present. No thyromegaly present. Cardiovascular: Regular rhythm and normal heart sounds. Pulmonary/Chest: Effort normal and breath sounds normal.   Abdominal: He exhibits no distension. There is no tenderness. There is no rebound. Musculoskeletal: He exhibits no tenderness or deformity. Neurological: He is alert and oriented to person, place, and time. Skin:   Patient has a wound that is bandaged and wrapped her that I did not undo in triage   Nursing note and vitals reviewed.        MDM  Number of Diagnoses or Management Options  CHANTE (acute kidney injury) ([facility] [address]): new and requires workup  Dehydration: new and requires workup  Hypotension, unspecified hypotension type: new and requires workup  Diagnosis management comments: H&H stable. Pt with evidence of elevated BUN and worsening Cr function. Pt not tolerating po at home. Will consult Hospitalist for admission. Amount and/or Complexity of Data Reviewed  Clinical lab tests: ordered and reviewed  Tests in the radiology section of CPT®: reviewed and ordered  Tests in the medicine section of CPT®: ordered and reviewed  Review and summarize past medical records: yes  Independent visualization of images, tracings, or specimens: yes    Risk of Complications, Morbidity, and/or Mortality  Presenting problems: moderate  Diagnostic procedures: moderate  Management options: moderate    Patient Progress  Patient progress: stable        ED Course       Procedures    Results Include:    Recent Results (from the past 24 hour(s))   GLUCOSE, POC    Collection Time: 05/03/18  1:00 PM   Result Value Ref Range    Glucose (POC) 146 (H) 65 - 100 mg/dL   CBC WITH AUTOMATED DIFF    Collection Time: 05/03/18  1:02 PM   Result Value Ref Range    WBC 10.2 4.3 - 11.1 K/uL    RBC 3.75 (L) 4.23 - 5.67 M/uL    HGB 11.4 (L) 13.6 - 17.2 g/dL    HCT 35.0 (L) 41.1 - 50.3 %    MCV 93.3 79.6 - 97.8 FL    MCH 30.4 26.1 - 32.9 PG    MCHC 32.6 31.4 - 35.0 g/dL    RDW 16.0 (H) 11.9 - 14.6 %    PLATELET 622 729 - 659 K/uL    MPV 11.6 10.8 - 14.1 FL    DF AUTOMATED      NEUTROPHILS 82 (H) 43 - 78 %    LYMPHOCYTES 13 13 - 44 %    MONOCYTES 5 4.0 - 12.0 %    EOSINOPHILS 0 (L) 0.5 - 7.8 %    BASOPHILS 0 0.0 - 2.0 %    IMMATURE GRANULOCYTES 0 0.0 - 5.0 %    ABS. NEUTROPHILS 8.3 (H) 1.7 - 8.2 K/UL    ABS. LYMPHOCYTES 1.3 0.5 - 4.6 K/UL    ABS. MONOCYTES 0.5 0.1 - 1.3 K/UL    ABS. EOSINOPHILS 0.0 0.0 - 0.8 K/UL    ABS. BASOPHILS 0.0 0.0 - 0.2 K/UL    ABS. IMM.  GRANS. 0.0 0.0 - 0.5 K/UL   METABOLIC PANEL, BASIC    Collection Time: 05/03/18  1:02 PM   Result Value Ref Range    Sodium 138 136 - 145 mmol/L    Potassium 4.3 3.5 - 5.1 mmol/L    Chloride 105 98 - 107 mmol/L    CO2 19 (L) 21 - 32 mmol/L    Anion gap 14 7 - 16 mmol/L    Glucose 147 (H) 65 - 100 mg/dL    BUN 57 (H) 8 - 23 MG/DL    Creatinine 3.56 (H) 0.8 - 1.5 MG/DL    GFR est AA 21 (L) >60 ml/min/1.73m2    GFR est non-AA 18 (L) >60 ml/min/1.73m2    Calcium 8.9 8.3 - 10.4 MG/DL   MAGNESIUM    Collection Time: 05/03/18  1:02 PM   Result Value Ref Range    Magnesium 2.9 (H) 1.8 - 2.4 mg/dL   EKG, 12 LEAD, INITIAL    Collection Time: 05/03/18  1:22 PM   Result Value Ref Range    Ventricular Rate 80 BPM    Atrial Rate 80 BPM    QRS Duration 214 ms    Q-T Interval 516 ms    QTC Calculation (Bezet) 595 ms    Calculated P Axis -18 degrees    Calculated R Axis -163 degrees    Calculated T Axis 129 degrees    Diagnosis       !! AGE AND GENDER SPECIFIC ECG ANALYSIS !!   Electronic ventricular pacemaker  When compared with ECG of 30-APR-2018 10:43,  No significant change was found     POC LACTIC ACID    Collection Time: 05/03/18  1:38 PM   Result Value Ref Range    Lactic Acid (POC) 1.4 0.5 - 1.9 mmol/L

## 2018-05-03 NOTE — PROGRESS NOTES
Care Coordinator spoke with patient's spouse Mrs. Ji Mayorga who informed Care Coordinator that she was taking patient Mr. Malachi Ahumada to ER due to recent fall and other complications. Patient's spouse unable to complete/declined Transitions of Care Outreach at this time. This note will not be viewable in [address]

## 2018-05-03 NOTE — PROGRESS NOTES
Initial visit to assess pt's spiritual needs. Ministry of presence & prayer to demonstrate caring & concern, convey emotional & spiritual support.     anna Keyes MDiv,ThM,PhD

## 2018-05-03 NOTE — PROGRESS NOTES
Patient with recent admit and discharge from [facility] (4-26-18 to 5-1-18) with referrals to [facility] (RN/PT) and [facility] (home infusion of Vancomycin and Rocephin).

## 2018-05-03 NOTE — H&P
Hospitalist H&P Note     Admit Date:  5/3/2018  1:06 PM   Name:  Marilou Merlin   Age:  80 y.o.  :  1935   MRN:  [id number]   PCP:  Glinda Mcburney, DO  Treatment Team: Attending Provider: Kathi Valencia MD    HPI:   80year old male with PMH CAD, DM, CHF EF 25%, ICD, PVD, Diabetic ulcer, who was discharged on  with IV antibiotics and surgery planned for osteomyelitis of right second toe on , with concerns of hypotension and dehydration expressed to family  by his  Home health nurse. Patient denies fevers, chills, n/v/d. Family states he hasn't been eating or drinking very much and the patient says he just feels weak. Family states patient is not nauseated, but after he swallows the food gets stuck in his esophagus and it comes back up. The only thing that does go down is when he sucks on ice. Creatinine 3.56 today. It was  2.71 on discharge . Additionally, family stated the patient fell last night and may have hit his head. He has an abrasion on the bridge of his nose anything to potentially he may have hit a piece of furniture. They do not think any loss of consciousness but they are not certain. CT negative. 10 systems reviewed and negative except as noted in HPI.   Past Medical History:   Diagnosis Date    Arrhythmia     VT    Atherosclerosis of native arteries of the extremities with intermittent claudication 2015    Automatic implantable cardiac defibrillator in situ 2015    CAD (coronary artery disease)     inoperable, medical management only    Chronic systolic heart failure ([address] 2015    Common variable immunodeficiency ([facility] 2015    Congestive heart failure, unspecified 2015    Diabetes ([facility] [facility] x 15 yrs    type 2- sqbs am avg ---hypo at 52's    Diseases of tricuspid valve 2015    Essential hypertension, benign 2015    Heart failure (HCC)     EF 25-30%    Hypertension x     controlled with meds    Occlusion and stenosis of carotid artery without mention of cerebral infarction 2015    Other chronic pulmonary heart diseases 2015    Other primary cardiomyopathies 2015    PAD (peripheral artery disease) (HCC)     carotid dz    Pain in limb 2015    Paroxysmal ventricular tachycardia ([facility] 2015    Peripheral vascular disease, unspecified ([facility] 2015    Type II or unspecified type diabetes mellitus without mention of complication, not stated as uncontrolled 2015      Past Surgical History:   Procedure Laterality Date    HX HERNIA REPAIR      HX PACEMAKER      defib placements x 2      No Known Allergies   Social History   Substance Use Topics    Smoking status: Never Smoker    Smokeless tobacco: Never Used    Alcohol use No      Family History   Problem Relation Age of Onset    Diabetes Sister       Immunization History   Administered Date(s) Administered    Influenza Vaccine 10/22/2002, 10/18/2007, 10/16/2008, 10/01/2009, 10/01/2010, 10/04/2011, 10/05/2013, 10/02/2014, 10/10/2016, 10/03/2017    Pneumococcal Conjugate (PCV-13) 10/03/2017    Pneumococcal Polysaccharide (PPSV-23) 2008    TB Skin Test (PPD) Intradermal 2018    Td 10/16/2008    Tdap 2005     PTA Medications:  Prior to Admission Medications   Prescriptions Last Dose Informant Patient Reported? Taking? Iron [id number] (MULTIGEN FOLIC) tab capsule   Yes No   Sig: Take 1 Tab by mouth daily. amiodarone (CORDARONE) 200 mg tablet   No No   Sig: Take 1 Tab by mouth daily. aspirin 81 mg chewable tablet   Yes No   Sig: Take 81 mg by mouth every morning. atorvastatin (LIPITOR) 40 mg tablet   No No   Sig: Take 1 Tab by mouth nightly. brinzolamide (AZOPT) 1 % ophthalmic suspension   Yes No   Sig: Administer 1 Drop to both eyes two (2) times a day. cefTRIAXone 2 gram 2 g, ADDaptor 1 Device IVPB   No No   Si g by IntraVENous route every twenty-four (24) hours for 8 days. latanoprost (XALATAN) 0.005 % ophthalmic solution   Yes No   Sig: Administer 1 Drop to both eyes nightly. levothyroxine (SYNTHROID) 25 mcg tablet   Yes No   Sig: Take  by mouth Daily (before breakfast). metroNIDAZOLE (FLAGYL) 500 mg tablet   No No   Sig: Take 1 Tab by mouth every eight (8) hours for 9 days. vancomycin 1,000 mg 1,000 mg, ADDaptor 1 Device IVPB   No No   Si,000 mg by IntraVENous route every twenty-four (24) hours for 9 days. Facility-Administered Medications: None       Objective:   Patient Vitals for the past 24 hrs:   Temp Pulse Resp BP SpO2   18 1719 - - - 92/54 -   18 1537 - 80 - 96/57 95 %   18 1427 - 80 - 106/58 97 %   18 1316 - - - 99/55 96 %   18 1312 - - - 96/55 97 %   18 1311 - - - (!) 86/52 99 %   18 1258 - - - 90/58 -   18 1256 98.6 °F (37 °C) 79 18 (!) 87/54 93 %     Oxygen Therapy  O2 Sat (%): 95 % (18 1537)  Pulse via Oximetry: 79 beats per minute (18 1537)  O2 Device: Room air (18 1256)  No intake or output data in the 24 hours ending 18 1819    Physical Exam:  General:    Chronically ill appearing. Elderly. Alert. Eyes:   Normal sclera. Extraocular movements intact. ENT:  Normocephalic, atraumatic. Dry mucous membranes  CV:   RRR. No m/r/g. ICD   Lungs:  CTAB. No wheezing, rhonchi, or rales. Room air  Abdomen: Soft, nontender, nondistended. Bowel sounds normal.   Extremities: Warm and dry. No cyanosis. BLE pitting edema. Neurologic: CN II-XII grossly intact. Sensation intact. Skin:     No rashes or jaundice. Normal coloration. Cellulits RLE. Psych:  Normal mood and affect. I reviewed the labs, imaging, EKGs, telemetry, and other studies done this admission.   Data Review:   Recent Results (from the past 24 hour(s))   GLUCOSE, POC    Collection Time: 18  1:00 PM   Result Value Ref Range    Glucose (POC) 146 (H) 65 - 100 mg/dL   CBC WITH AUTOMATED DIFF    Collection Time: 05/03/18  1:02 PM   Result Value Ref Range    WBC 10.2 4.3 - 11.1 K/uL    RBC 3.75 (L) 4.23 - 5.67 M/uL    HGB 11.4 (L) 13.6 - 17.2 g/dL    HCT 35.0 (L) 41.1 - 50.3 %    MCV 93.3 79.6 - 97.8 FL    MCH 30.4 26.1 - 32.9 PG    MCHC 32.6 31.4 - 35.0 g/dL    RDW 16.0 (H) 11.9 - 14.6 %    PLATELET 575 460 - 703 K/uL    MPV 11.6 10.8 - 14.1 FL    DF AUTOMATED      NEUTROPHILS 82 (H) 43 - 78 %    LYMPHOCYTES 13 13 - 44 %    MONOCYTES 5 4.0 - 12.0 %    EOSINOPHILS 0 (L) 0.5 - 7.8 %    BASOPHILS 0 0.0 - 2.0 %    IMMATURE GRANULOCYTES 0 0.0 - 5.0 %    ABS. NEUTROPHILS 8.3 (H) 1.7 - 8.2 K/UL    ABS. LYMPHOCYTES 1.3 0.5 - 4.6 K/UL    ABS. MONOCYTES 0.5 0.1 - 1.3 K/UL    ABS. EOSINOPHILS 0.0 0.0 - 0.8 K/UL    ABS. BASOPHILS 0.0 0.0 - 0.2 K/UL    ABS. IMM. GRANS. 0.0 0.0 - 0.5 K/UL   METABOLIC PANEL, BASIC    Collection Time: 05/03/18  1:02 PM   Result Value Ref Range    Sodium 138 136 - 145 mmol/L    Potassium 4.3 3.5 - 5.1 mmol/L    Chloride 105 98 - 107 mmol/L    CO2 19 (L) 21 - 32 mmol/L    Anion gap 14 7 - 16 mmol/L    Glucose 147 (H) 65 - 100 mg/dL    BUN 57 (H) 8 - 23 MG/DL    Creatinine 3.56 (H) 0.8 - 1.5 MG/DL    GFR est AA 21 (L) >60 ml/min/1.73m2    GFR est non-AA 18 (L) >60 ml/min/1.73m2    Calcium 8.9 8.3 - 10.4 MG/DL   MAGNESIUM    Collection Time: 05/03/18  1:02 PM   Result Value Ref Range    Magnesium 2.9 (H) 1.8 - 2.4 mg/dL   EKG, 12 LEAD, INITIAL    Collection Time: 05/03/18  1:22 PM   Result Value Ref Range    Ventricular Rate 80 BPM    Atrial Rate 80 BPM    QRS Duration 214 ms    Q-T Interval 516 ms    QTC Calculation (Bezet) 595 ms    Calculated P Axis -18 degrees    Calculated R Axis -163 degrees    Calculated T Axis 129 degrees    Diagnosis       !! AGE AND GENDER SPECIFIC ECG ANALYSIS !!   Electronic ventricular pacemaker  When compared with ECG of 30-APR-2018 10:43,  No significant change was found  Confirmed by AUGUST LIGHT (), Jane Rivera ([id number]) on 5/3/2018 4:08:54 PM     POC LACTIC ACID    Collection Time: 05/03/18  1:38 PM   Result Value Ref Range    Lactic Acid (POC) 1.4 0.5 - 1.9 mmol/L       All Micro Results     None          Other Studies:  Ct Head Wo Cont    Result Date: 5/3/2018  Examination: CT scan of the brain without contrast. History: fell and hit his head, 80 years Male Technique: 5 mm axial imaging of the brain from the posterior fossa to the vertex. Radiation dose reduction techniques were used for this study:  Our CT scanners use one or all of the following: Automated exposure control, adjustment of the mA and/or kVp according to patient's size, iterative reconstruction. Comparison:  None available Findings: Probable mild microvascular disease. There appears to be regional encephalomalacia of the left occipital lobe likely representing sequelae of chronic injury or infarct. The ventricles, sulci are age-appropriate. No intracranial hemorrhage or extra-axial collection is identified. No evidence of acute infarct. No mass effect or midline shift is present. Basal cisterns are intact. The visualized paranasal sinuses and mastoid air cells are clear. The orbits, bones, and soft tissues are normal in appearance. IMPRESSION: No acute intracranial abnormality. Chronic appearing findings as above. Assessment and Plan:     Hospital Problems as of 5/3/2018  Date Reviewed: 4/28/2018          Codes Class Noted - Resolved POA    CHANTE (acute kidney injury) ([facility] [address] ICD-10-CM: N17.9  ICD-9-CM: 584.9  5/3/2018 - Present Unknown              PLAN:    CHANTE- Due to dehydration?  Vanco?  IVF 75cc/hr  BMP in am    Diabetic foot ulcer/ Osteomylitis-  Continue antibiotics  Dressing changes/ wound consult    CAD  Hold plavix for surgery    DM  SSI        Discharge planning:  Home with HH  DVT ppx: heparin   Code status:  Full  Estimated LOS:  Greater than 2 midnights  Risk:  high    Plan of care discussed with Dr. Jami Sarah   Signed:  Yonatan Nevarez NP

## 2018-05-03 NOTE — ED TRIAGE NOTES
Reports unable to eat and drink. States seen here 2 days ago. Reports home health nurse said his BP was low. Is receiving IV antibiotics at home in preparation for toe amputation on Monday.

## 2018-05-04 PROBLEM — R13.19 ESOPHAGEAL DYSPHAGIA: Status: ACTIVE | Noted: 2018-01-01

## 2018-05-04 NOTE — CONSULTS
[facility] Consult Note       Primary GI Physician:     Referring Physician:  Dr Emilie Schirmer    Consult Date:  5/4/2018    Admit Date:  5/3/2018    Chief Complaint:  Dysphagia    Subjective:     History of Present Illness:  Patient is a 80 y.o. male seen in consultation at the request of Dr. Emilie Schirmer for evaluation of solid food dysphagia. He was admitted 5/3 after presenting to the ER upon recommendations of home health nursing after reporting weakness and difficulty eating. PMH significant for recent admission at [facility] for osteomyelitis with amputation of right second toe planned from Monday. He was discharged 5/1 to home for continued IV antibx unitl surgery, but reported difficulty maintaining hydration, noting fall the day prior to ER arrival.  CT head was negative. Labs in ER noted worsening of CKD with creatinine rising from 2.71 to 3.56. WBC was noted with stable hgb. Patient was evaluated by speech therapy this morning with no evidence of aspiration with intake of applesauce, but noting food seemed to hang up mid chest.  UGI/BaSwSBFT has been ordered by the admitting service. Patient has been on aspirin as an outpatient but denies use of nsaids. Patient reports symptoms began in late January after perceived traumatic intubation following ICD generator change in January of this year. He has had progressive difficulty swallowing, initially with lodging of solids in the upper chest, now even liquids. He denies chronic heartburn and has not had nausea, excepting when he gags the food bolus back up after it lodges. He denies prior hx of EGD but has had [facility]. He is quite concerned about getting the dysphagia corrected in order to proceed with surgery on Monday. PMH includes CAD, DM, CHF EF 25%, ICD, PVD, diabetic ulcer.       PMH:  Past Medical History:   Diagnosis Date    Arrhythmia 2004    VT    Atherosclerosis of native arteries of the extremities with intermittent claudication 9/16/2015    Automatic implantable cardiac defibrillator in situ 9/16/2015    CAD (coronary artery disease)     inoperable, medical management only    Chronic systolic heart failure ([facility] 9/16/2015    Common variable immunodeficiency ([facility] [facility] 9/16/2015    Congestive heart failure, unspecified 9/16/2015    Diabetes ([facility] [facility] x 15 yrs    type 2- sqbs am avg ---hypo at 52's    Diseases of tricuspid valve 9/16/2015    Essential hypertension, benign 9/16/2015    Heart failure (HCC)     EF 25-30%    Hypertension x 1996    controlled with meds    Occlusion and stenosis of carotid artery without mention of cerebral infarction 9/16/2015    Other chronic pulmonary heart diseases 9/16/2015    Other primary cardiomyopathies 9/16/2015    PAD [facility]peripheral artery disease) ([facility]     carotid dz    Pain in limb 9/16/2015    Paroxysmal ventricular tachycardia ([facility] 9/16/2015    Peripheral vascular disease, unspecified ([facility] [facility] 9/16/2015    Type II or unspecified type diabetes mellitus without mention of complication, not stated as uncontrolled 9/16/2015       PSH:  Past Surgical History:   Procedure Laterality Date    HX HERNIA REPAIR      HX PACEMAKER      defib placements x 2       Allergies:  No Known Allergies    Home Medications:  Prior to Admission medications    Medication Sig Start Date End Date Taking? Authorizing Provider   cefTRIAXone 2 gram 2 g, ADDaptor 1 Device IVPB 2 g by IntraVENous route every twenty-four (24) hours for 8 days. 5/2/18 5/10/18 Yes Jorge A Cantu NP   metroNIDAZOLE (FLAGYL) 500 mg tablet Take 1 Tab by mouth every eight (8) hours for 9 days. 5/1/18 5/10/18 Yes Jorge A Cantu NP   vancomycin 1,000 mg 1,000 mg, ADDaptor 1 Device IVPB 1,000 mg by IntraVENous route every twenty-four (24) hours for 9 days. 5/1/18 5/10/18 Yes Jorge A Cantu NP   brinzolamide (AZOPT) 1 % ophthalmic suspension Administer 1 Drop to both eyes two (2) times a day. Yes Historical Provider   amiodarone (CORDARONE) 200 mg tablet Take 1 Tab by mouth daily. 1/19/18  Yes Luz Saint, MD   atorvastatin (LIPITOR) 40 mg tablet Take 1 Tab by mouth nightly. 1/19/18  Yes Luz Saint, MD   Iron [id number] (MULTIGEN FOLIC) tab capsule Take 1 Tab by mouth daily. Yes Historical Provider   levothyroxine (SYNTHROID) 25 mcg tablet Take  by mouth Daily (before breakfast). Yes Historical Provider   aspirin 81 mg chewable tablet Take 81 mg by mouth every morning. 7/6/11  Yes Historical Provider   latanoprost (XALATAN) 0.005 % ophthalmic solution Administer 1 Drop to both eyes nightly.    Yes Historical Provider       Hospital Medications:  Current Facility-Administered Medications   Medication Dose Route Frequency    sodium chloride (NS) flush 10-40 mL  10-40 mL IntraVENous Q8H    sodium chloride (NS) flush 10-40 mL  10-40 mL IntraVENous PRN    heparin (porcine) pf 300-600 Units  300-600 Units InterCATHeter Q8H PRN    Vancomycin Intermittent Dosing Placeholder   Other Rx Dosing/Monitoring    Vancomycin Random Level Reminder   Other ONCE    acetaminophen (TYLENOL) tablet 650 mg  650 mg Oral Q4H PRN    HYDROcodone-acetaminophen (NORCO) 5-325 mg per tablet 1 Tab  1 Tab Oral Q4H PRN    naloxone (NARCAN) injection 0.4 mg  0.4 mg IntraVENous PRN    diphenhydrAMINE (BENADRYL) injection 25 mg  25 mg IntraVENous Q4H PRN    ondansetron (ZOFRAN) injection 4 mg  4 mg IntraVENous Q4H PRN    senna-docusate (PERICOLACE) 8.6-50 mg per tablet 2 Tab  2 Tab Oral DAILY PRN    heparin (porcine) injection 5,000 Units  5,000 Units SubCUTAneous Q8H    amiodarone (CORDARONE) tablet 200 mg  200 mg Oral DAILY    aspirin chewable tablet 81 mg  81 mg Oral DAILY    atorvastatin (LIPITOR) tablet 40 mg  40 mg Oral QHS    dorzolamide (TRUSOPT) 2 % ophthalmic solution 1 Drop  1 Drop Both Eyes BID    cefTRIAXone (ROCEPHIN) 2 g in 0.9% sodium chloride (MBP/ADV) 50 mL  2 g IntraVENous Q24H  Iron [id number] (MULTIGEN FOLIC) capsule 1 Tab (Patient Supplied)  1 Tab Oral DAILY    latanoprost (XALATAN) 0.005 % ophthalmic solution 1 Drop  1 Drop Both Eyes QHS    levothyroxine (SYNTHROID) tablet 25 mcg  25 mcg Oral ACB    0.9% sodium chloride infusion  75 mL/hr IntraVENous CONTINUOUS    insulin regular (NOVOLIN R, HUMULIN R) injection   SubCUTAneous AC&HS    metroNIDAZOLE (FLAGYL) tablet 500 mg  500 mg Oral Q12H       Social History:  Social History   Substance Use Topics    Smoking status: Never Smoker    Smokeless tobacco: Never Used    Alcohol use No         Family History:  Family History   Problem Relation Age of Onset    Diabetes Sister        Review of Systems:  A detailed 10 system ROS is obtained, with pertinent positives as listed above. Also significant for generalized edema, worse in the BLE. Diet:  clears    Objective:     Physical Exam:  Vitals:  Visit Vitals    BP 90/58 (BP 1 Location: Left arm, BP Patient Position: Sitting)    Pulse 98    Temp 97.5 °F (36.4 °C)    Resp 18    Ht 5' 8\" (1.727 m)    Wt 74.8 kg (165 lb)    SpO2 98%    BMI 25.09 kg/m2     Gen:  Pt is alert, cooperative, no acute distress  Skin:  Extremities and face reveal no rashes. Pale. HEENT: Sclerae anicteric. Extra-occular muscles are intact. No oral ulcers. No abnormal pigmentation of the lips. The neck is supple. Cardiovascular: Regular rate and rhythm. No murmurs, gallops, or rubs. ICD noted to chest wall. Respiratory:  Comfortable breathing with no accessory muscle use. Clear breath sounds anteriorly with no wheezes, rales, or rhonchi. GI:  Abdomen nondistended, soft, and nontender. Normal active bowel sounds. No enlargement of the liver or spleen. No masses palpable. Rectal:  Deferred  Musculoskeletal:  Edema of the lower legs. RLE foot bandaged. Neurological:  Gross memory appears intact. Patient is alert and oriented.   Psychiatric:  Mood appears appropriate with judgement intact. Lymphatic:  No cervical or supraclavicular adenopathy. Laboratory:    Recent Labs      05/04/18   0409  05/03/18   1302   WBC  9.9  10.2   HGB  11.5*  11.4*   HCT  35.3*  35.0*   PLT  157  150   MCV  93.6  93.3   NA  139  138   K  4.4  4.3   CL  107  105   CO2  17*  19*   BUN  59*  57*   CREA  3.48*  3.56*   CA  8.5  8.9   MG   --   2.9*   GLU  151*  147*          CT scan of the brain without contrast 5/3/18  History: fell and hit his head, 82 years Male   Technique: 5 mm axial imaging of the brain from the posterior fossa to the  vertex. Radiation dose reduction techniques were used for this study:  Our CT  scanners use one or all of the following: Automated exposure control, adjustment  of the mA and/or kVp according to patient's size, iterative reconstruction.   Comparison:  None available   Findings: Probable mild microvascular disease. There appears to be regional  encephalomalacia of the left occipital lobe likely representing sequelae of  chronic injury or infarct. The ventricles, sulci are age-appropriate. No  intracranial hemorrhage or extra-axial collection is identified. No evidence of  acute infarct. No mass effect or midline shift is present. Basal cisterns are  intact. The visualized paranasal sinuses and mastoid air cells are clear. The  orbits, bones, and soft tissues are normal in appearance.   IMPRESSION  IMPRESSION: No acute intracranial abnormality. Chronic appearing findings as  above. Assessment:     Principal Problem:    Esophageal dysphagia (5/4/2018)    Active Problems:    Diabetic ulcer of toe ([facility] [address] (4/26/2018)      CHANTE (acute kidney injury) ([facility] [address] (5/3/2018)        Plan:     79 yo male with PMH CAD, DM, CHF EF 25%, ICD, PVD, diabetic ulcer, is seen in consultation of liquid/solid food dysphagia which he reports began following traumatic intubation after cardiac arrest with ICD generator change in late January.   Since that time he has had a progressive dysphagia with solids, and now liquids as well. He is only daily aspirin but denies use of nsaids. He was rececently admitted with discharge from [facility] 5/1 for diabetic foot ulcer, scheduled for amputation on Monday. He returned to the ER yesterday after having increased weakness with a fall. Acute on CKD noted with creatinine 3.56, up from 2.71 two days prior at discharge. Hgb and WBC were normal.  Speech evaluation this morning revealed no evidence of aspiration. Ba Swallow has been scheduled for this afternoon. 1.  Await results of imaging today  2. Patient will likely need EGD with possible dilation; he is at increased risk for sedation and procedures secondary to cardiac status and ejection fraction 25% noted in January  3. Further recommendations to be based on findings of above  4. Begin prophylactic acid suppression to see what impact this may have on symptoms    Patient is seen and examined in collaboration with Dr. Eliz Flores. Assessment and plan as per Dr. Eliz Flores.   Obey Phillips NP

## 2018-05-04 NOTE — PROGRESS NOTES
Cm met with pt. Pt recently discharged from 2nd floor. Pt currently with [facility] for IV abx. Desiree Currie, with [facility], stopped by to see pt. CM will follow.

## 2018-05-04 NOTE — PROGRESS NOTES
Pt's BP is 91/57 at 0747. RN assessed pt. Pt displayed no s/s of hypotension. RN notified Dr. Arik Cuevas. Received orders to notify provider when SBP is less than 90. RN will continue to monitor pt.

## 2018-05-04 NOTE — PROGRESS NOTES
Called for systolic BP of 85. Has been intermittently hypotensive since admission. Responded to 250 ml fluid bolus last night. Will give 250 ml NS now.     Joellen Lopez MD

## 2018-05-04 NOTE — PROGRESS NOTES
Problem: Dysphagia (Adult)  Goal: *Acute Goals and Plan of Care (Insert Text)  STG: Patient will swallow full liquid diet without overt signs or symptoms of respiratory compromise 100% of the time. LTG: Patient will tolerate least restrictive diet consistency without respiratory compromise by discharge. Speech language pathology: bedside swallow note: Initial Assessment    NAME/AGE/GENDER: Halima Norton is a 80 y.o. male  DATE: 5/4/2018  PRIMARY DIAGNOSIS: CHANTE (acute kidney injury) ([facility] [address]       ICD-10: Treatment Diagnosis: Pharyngoesophageal dysphagia (R13.14)  INTERDISCIPLINARY COLLABORATION: Registered Nurse and Physician  PRECAUTIONS/ALLERGIES: Review of patient's allergies indicates no known allergies. ASSESSMENT:Based on the objective data described below, Mr. Bao Ruiz presents with no overt signs or sympotms of aspiration with single sips of thin liquid by cup or straw. Patient gagging with spoonful of applesauce, but able to swallow without adequately x2 presentations. Patient reports that applesauce feels stuck midsternum. No other consistencies presented. Patient reports difficulties swallowing since cardiac procedure earlier this year. He reports swallowing difficulties have gotten worse. Discussed with Suni Yung RN and Dr. Anjali Campos. Recommend full liquid diet and further assessment of esophageal phase of swallow via esophagram. Dr. Anjali Campos to order for today. SLP to follow up with diet tolerance and possible upgrades after surgery, which is scheduled for Monday. ?????? ? ? This section established at most recent assessment??????????  PROBLEM LIST (Impairments causing functional limitations):  1. Pharyngoesophageal dysphagia  REHABILITATION POTENTIAL FOR STATED GOALS: Good  PLAN OF CARE:   Patient will benefit from skilled intervention to address the following impairments.   RECOMMENDATIONS AND PLANNED INTERVENTIONS (Benefits and precautions of therapy have been discussed with the patient.):  · Full liquid diet until further assessment of esophageal phase of swallow  MEDICATIONS:  · In liquid  COMPENSATORY STRATEGIES/MODIFICATIONS INCLUDING:  · Alternate liquids/solids  · Small sips and bites  · Upright for all PO and remain upright for 30 min  OTHER RECOMMENDATIONS (including follow up treatment recommendations): · Family training/education  · Patient education  RECOMMENDED DIET MODIFICATIONS DISCUSSED WITH:  · Hospitalist  · Nursing  · Patient  FREQUENCY/DURATION: Continue to follow patient 3 times a week for duration of hospital stay to address above goals. RECOMMENDED REHABILITATION/EQUIPMENT: (at time of discharge pending progress): Due to the probability of continued deficits (see above) this patient will not likely need continued skilled speech therapy after discharge. SUBJECTIVE:   \"I can just tell you it isn't where it's supposed to be. I feel it stuck all the way down. \"  History of Present Injury/Illness: Mr. Dionna Wolfe  has a past medical history of Arrhythmia (2004); Atherosclerosis of native arteries of the extremities with intermittent claudication (9/16/2015); Automatic implantable cardiac defibrillator in situ (9/16/2015); CAD (coronary artery disease); Chronic systolic heart failure ([address] (9/16/2015); Common variable immunodeficiency ([address] (9/16/2015); Congestive heart failure, unspecified (9/16/2015); Diabetes ([address] (x 15 yrs); Diseases of tricuspid valve (9/16/2015); Essential hypertension, benign (9/16/2015); Heart failure ([address] Hypertension (x 1996); Occlusion and stenosis of carotid artery without mention of cerebral infarction (9/16/2015); Other chronic pulmonary heart diseases (9/16/2015); Other primary cardiomyopathies (9/16/2015); PAD (peripheral artery disease) ([address] Pain in limb (9/16/2015); Paroxysmal ventricular tachycardia ([address] (9/16/2015);  Peripheral vascular disease, unspecified ([address] (9/16/2015); and Type II or unspecified type diabetes mellitus without mention of complication, not stated as uncontrolled (9/16/2015). He also has no past medical history of Arthritis; Asthma; Autoimmune disease ([facility] [address] [address] Cancer [facility] Chronic kidney disease; Chronic obstructive pulmonary disease ([facility] Difficult intubation; GERD (gastroesophageal reflux disease); Liver disease; Malignant hyperthermia due to anesthesia; Nausea & vomiting; Pseudocholinesterase deficiency; Psychiatric disorder; PUD (peptic ulcer disease); Seizures ([facility] Sleep apnea; Stroke [facility]); Thromboembolus ([facility] or Thyroid disease. Ricci Mcconnell He also  has a past surgical history that includes hx pacemaker and hx hernia repair. Present Symptoms: dysphagia   Pain Intensity 1: 0  Current Medications:   No current facility-administered medications on file prior to encounter. Current Outpatient Prescriptions on File Prior to Encounter   Medication Sig Dispense Refill    cefTRIAXone 2 gram 2 g, ADDaptor 1 Device IVPB 2 g by IntraVENous route every twenty-four (24) hours for 8 days. 1 Dose 9    metroNIDAZOLE (FLAGYL) 500 mg tablet Take 1 Tab by mouth every eight (8) hours for 9 days. 27 Tab 0    vancomycin 1,000 mg 1,000 mg, ADDaptor 1 Device IVPB 1,000 mg by IntraVENous route every twenty-four (24) hours for 9 days. 1 Dose 9    brinzolamide (AZOPT) 1 % ophthalmic suspension Administer 1 Drop to both eyes two (2) times a day.  amiodarone (CORDARONE) 200 mg tablet Take 1 Tab by mouth daily. 90 Tab 3    atorvastatin (LIPITOR) 40 mg tablet Take 1 Tab by mouth nightly. 90 Tab 3    Iron [id number] (MULTIGEN FOLIC) tab capsule Take 1 Tab by mouth daily.  levothyroxine (SYNTHROID) 25 mcg tablet Take  by mouth Daily (before breakfast).  aspirin 81 mg chewable tablet Take 81 mg by mouth every morning.  latanoprost (XALATAN) 0.005 % ophthalmic solution Administer 1 Drop to both eyes nightly.        Current Dietary Status:  Regular textures, thin liquids  History of reflux:  NO    Reflux medication:n/a  Social History/Home Situation:   Home Environment: Private residence  # Steps to Enter: 8  One/Two Story Residence: One story  Living Alone: No  Support Systems: Spouse/Significant Other/Partner, Family member(s), Friends \ neighbors  Patient Expects to be Discharged to[de-identified] Private residence  Current DME Used/Available at Home: Cane, straight, Walker, Raised toilet seat  OBJECTIVE:   Respiratory Status:  Room air     CXR Results:IMPRESSION:  NEW PICC LINE TIP AT [address]. MRI/CT Results:IMPRESSION: No acute intracranial abnormality. Chronic appearing findings as  above. Cognitive and Communication Status:  Neurologic State: Alert  Orientation Level: Appropriate for age  Cognition: Follows commands  Perception: Appears intact  Perseveration: No perseveration noted  Safety/Judgement: Awareness of environment    BEDSIDE SWALLOW EVALUATION  Oral Assessment:  Oral Assessment  Labial: No impairment  Dentition: Upper & lower dentures  Oral Hygiene: adequate  Lingual: No impairment  P.O. Trials:  Patient Position: upright in chair    The patient was given the following:   Consistency Presented:  Thin liquid;Puree  How Presented: Cup/sip;Straw;Spoon    ORAL PHASE:  Bolus Acceptance: No impairment  Bolus Formation/Control: No impairment  Propulsion: No impairment     Oral Residue: None    PHARYNGEAL PHASE:  Initiation of Swallow: No impairment  Laryngeal Elevation: Functional  Aspiration Signs/Symptoms: Runny nose  Vocal Quality: No impairment           Pharyngeal Phase Characteristics: Feeling of discomfort (gagging)    OTHER OBSERVATIONS:  Rate/bite size: Impaired   Endurance:  Impaired     Tool Used: Dysphagia Outcome and Severity Scale (STANTON)    Score Comments   Normal Diet  [] 7 With no strategies or extra time needed   Functional Swallow  [] 6 May have mild oral or pharyngeal delay       Mild Dysphagia    [] 5 Which may require one diet consistency restricted (those who demonstrate penetration which is entirely cleared on MBS would be included)   Mild-Moderate Dysphagia  [] 4 With 1-2 diet consistencies restricted       Moderate Dysphagia  [x] 3 With 2 or more diet consistencies restricted       Moderately Severe Dysphagia  [] 2 With partial PO strategies (trials with ST only)       Severe Dysphagia  [] 1 With inability to tolerate any PO safely          Score:  Initial: 3 Most Recent: X (Date: -- )   Interpretation of Tool: The Dysphagia Outcome and Severity Scale (STANTON) is a simple, easy-to-use, 7-point scale developed to systematically rate the functional severity of dysphagia based on objective assessment and make recommendations for diet level, independence level, and type of nutrition. Score 7 6 5 4 3 2 1   Modifier CH CI CJ CK CL CM CN   ?  Swallowing:     - CURRENT STATUS: CL - 60%-79% impaired, limited or restricted    - GOAL STATUS:  CJ - 20%-39% impaired, limited or restricted    - D/C STATUS:  ---------------To be determined---------------  Payor: [employer] MEDICARE / Plan: [facility] [employer] MEDICARE CHOICE PPO/PFFS / Product Type: [employer] Care Medicare /     TREATMENT:    (In addition to Assessment/Re-Assessment sessions the following treatments were rendered)  Assessment/Reassessment only, no treatment provided today  MODALITIES:                                                                    ORAL MOTOR  EXERCISES:                                                                                                                                                                      LARYNGEAL / PHARYNGEAL EXERCISES:                                                                                                                                     Safety:   After treatment position/precautions:  · Up in chair  · RN notified  · MD notified  · Upright in Bed  Treatment Assessment:   Progression/Medical Necessity:   · Patient is expected to demonstrate progress in swallow function, diet tolerance and swallow safety to improve swallow safety, work toward diet advancement and decrease aspiration risk. Compliance with Program/Exercises: Will assess as treatment progresses. Reason for Continuation of Services/Other Comments:  · Patient continues to require skilled intervention due to medical complications. Recommendations/Intent for next treatment session: \"Treatment next visit will focus on diet tolerance\".     Total Treatment Duration:  Time In: 1025  Time Out: [place], [place], CCC-SLP

## 2018-05-04 NOTE — PROGRESS NOTES
2047 call to on call patient BP 83/59  Did try a linette cuff on the patient. Per Dr Ismael Ordoñez give 250 ml bolus. BP ok where it is at.due to pt history. CHF and EF 25%. 250 ml bolus given per order. 2141 bp 90/70 Left leg p 66 r 16 95% RA temp 98.2 oral.  Patient to call to get up    Patient complaining of leg aching. He had to get up and then back to bed then up in the recliner for 15 minutes. He has been up x 2 to use the [facility]. Patient assisted back to bed.    0318 bp 81/50. Ana Mejia RN placed a call to on call. 250 ml bolus. Bolus completed. bp 90/59. Patient wants to get up again. I told him no the BP was too low. Also conveyed to the PCT he is not to get up at this time. 4594 patient called needs to urinate. Voided 50 ml nj clear urine. Unsteady gait bp still low this am.  Patient having bouts of dry heaves but brings nothing up.

## 2018-05-04 NOTE — PROGRESS NOTES
Hospitalist Progress Note     Admit Date:  5/3/2018  1:06 PM   Name:  Inessa Easley   Age:  80 y.o.  :  1935   MRN:  [id number]   PCP:  Luis Carlos Harvey DO  Treatment Team: Attending Provider: Ama Walters MD; Utilization Review: Rodrigo Jacob RN; Care Manager: Heydi Lopez [facility] [place]    Subjective:   80year old male with PMH CAD, DM, CHF EF 25%, ICD, PVD, Diabetic ulcer, who was discharged on  with IV antibiotics and surgery planned for osteomyelitis of right second toe on , with concerns of hypotension and dehydration expressed to family  by his [facility] [place] nurse. Patient denies fevers, chills, n/v/d.  Family states he hasn't been eating or drinking very much and the patient says he just feels weak. Family states patient is not nauseated, but after he swallows the food gets stuck in his esophagus and it comes back up. The only thing that does go down is when he sucks on ice. Creatinine 3.56 today.  It was  2.71 on discharge .     18  Pt c/o having problems swallowing-spoke to speech  Low bp - improved    Objective:   Patient Vitals for the past 24 hrs:   Temp Pulse Resp BP SpO2   18 1528 97.6 °F (36.4 °C) 76 17 96/55 97 %   18 1159 97.5 °F (36.4 °C) 98 18 90/58 98 %   18 0747 98.2 °F (36.8 °C) 86 18 91/57 94 %   18 0352 - - - 90/59 -   18 0343 - - - (!) 84/51 -   18 0318 97.6 °F (36.4 °C) 81 18 (!) 81/50 95 %   18 2141 98.2 °F (36.8 °C) 66 16 90/70 95 %   18 - - - (!) 83/59 -   18 - - - (!) 83/54 -   18 - - - (!) 77/54 -   18 193 98.4 °F (36.9 °C) 79 14 (!) 88/55 99 %     Oxygen Therapy  O2 Sat (%): 97 % (18 1528)  Pulse via Oximetry: 79 beats per minute (18 1537)  O2 Device: Room air (18 0318)    Intake/Output Summary (Last 24 hours) at 18 1854  Last data filed at 18 1732   Gross per 24 hour   Intake             2730 ml   Output               50 ml   Net             3890 ml         General:    Well nourished. Alert.    heent- normal  CV:   RRR. No murmur, rub, or gallop. Lungs:   Clear to auscultation bilaterally. No wheezing, rhonchi, or rales. Abdomen:   Soft, nontender, nondistended. Cns- moves all ext  Extremities: Warm and dry. No cyanosis . Pitting edema. Both legs knee down  Skin:     Dressing rt 2nd toe    Data Review:  I have reviewed all labs, meds, telemetry events, and studies from the last 24 hours. Recent Results (from the past 24 hour(s))   VANCOMYCIN, RANDOM    Collection Time: 05/03/18  8:17 PM   Result Value Ref Range    Vancomycin, random 29.0 UG/ML   GLUCOSE, POC    Collection Time: 05/03/18  9:32 PM   Result Value Ref Range    Glucose (POC) 146 (H) 65 - 137 mg/dL   METABOLIC PANEL, BASIC    Collection Time: 05/04/18  4:09 AM   Result Value Ref Range    Sodium 139 136 - 145 mmol/L    Potassium 4.4 3.5 - 5.1 mmol/L    Chloride 107 98 - 107 mmol/L    CO2 17 (L) 21 - 32 mmol/L    Anion gap 15 7 - 16 mmol/L    Glucose 151 (H) 65 - 100 mg/dL    BUN 59 (H) 8 - 23 MG/DL    Creatinine 3.48 (H) 0.8 - 1.5 MG/DL    GFR est AA 22 (L) >60 ml/min/1.73m2    GFR est non-AA 18 (L) >60 ml/min/1.73m2    Calcium 8.5 8.3 - 10.4 MG/DL   CBC WITH AUTOMATED DIFF    Collection Time: 05/04/18  4:09 AM   Result Value Ref Range    WBC 9.9 4.3 - 11.1 K/uL    RBC 3.77 (L) 4.23 - 5.67 M/uL    HGB 11.5 (L) 13.6 - 17.2 g/dL    HCT 35.3 (L) 41.1 - 50.3 %    MCV 93.6 79.6 - 97.8 FL    MCH 30.5 26.1 - 32.9 PG    MCHC 32.6 31.4 - 35.0 g/dL    RDW 16.1 (H) 11.9 - 14.6 %    PLATELET 439 539 - 136 K/uL    MPV 11.5 10.8 - 14.1 FL    DF AUTOMATED      NEUTROPHILS 87 (H) 43 - 78 %    LYMPHOCYTES 9 (L) 13 - 44 %    MONOCYTES 4 4.0 - 12.0 %    EOSINOPHILS 0 (L) 0.5 - 7.8 %    BASOPHILS 0 0.0 - 2.0 %    IMMATURE GRANULOCYTES 0 0.0 - 5.0 %    ABS. NEUTROPHILS 8.7 (H) 1.7 - 8.2 K/UL    ABS. LYMPHOCYTES 0.9 0.5 - 4.6 K/UL    ABS. MONOCYTES 0.4 0.1 - 1.3 K/UL    ABS.  EOSINOPHILS 0.0 0.0 - 0.8 K/UL ABS. BASOPHILS 0.0 0.0 - 0.2 K/UL    ABS. IMM.  GRANS. 0.0 0.0 - 0.5 K/UL   GLUCOSE, POC    Collection Time: 05/04/18  7:42 AM   Result Value Ref Range    Glucose (POC) 141 (H) 65 - 100 mg/dL   GLUCOSE, POC    Collection Time: 05/04/18 11:54 AM   Result Value Ref Range    Glucose (POC) 155 (H) 65 - 100 mg/dL   GLUCOSE, POC    Collection Time: 05/04/18  4:05 PM   Result Value Ref Range    Glucose (POC) 148 (H) 65 - 100 mg/dL        All Micro Results     None          Current Meds:  Current Facility-Administered Medications   Medication Dose Route Frequency    sodium chloride (NS) flush 10-40 mL  10-40 mL IntraVENous Q8H    sodium chloride (NS) flush 10-40 mL  10-40 mL IntraVENous PRN    heparin (porcine) pf 300-600 Units  300-600 Units InterCATHeter Q8H PRN    Vancomycin Intermittent Dosing Placeholder   Other Rx Dosing/Monitoring    Vancomycin Random Level Reminder   Other ONCE    pantoprazole (PROTONIX) 40 mg in sodium chloride 0.9% 10 mL injection  40 mg IntraVENous DAILY    acetaminophen (TYLENOL) tablet 650 mg  650 mg Oral Q4H PRN    HYDROcodone-acetaminophen (NORCO) 5-325 mg per tablet 1 Tab  1 Tab Oral Q4H PRN    naloxone (NARCAN) injection 0.4 mg  0.4 mg IntraVENous PRN    diphenhydrAMINE (BENADRYL) injection 25 mg  25 mg IntraVENous Q4H PRN    ondansetron (ZOFRAN) injection 4 mg  4 mg IntraVENous Q4H PRN    senna-docusate (PERICOLACE) 8.6-50 mg per tablet 2 Tab  2 Tab Oral DAILY PRN    heparin (porcine) injection 5,000 Units  5,000 Units SubCUTAneous Q8H    amiodarone (CORDARONE) tablet 200 mg  200 mg Oral DAILY    aspirin chewable tablet 81 mg  81 mg Oral DAILY    atorvastatin (LIPITOR) tablet 40 mg  40 mg Oral QHS    dorzolamide (TRUSOPT) 2 % ophthalmic solution 1 Drop  1 Drop Both Eyes BID    cefTRIAXone (ROCEPHIN) 2 g in 0.9% sodium chloride (MBP/ADV) 50 mL  2 g IntraVENous Q24H    Iron [id number]-[id number] (MULTIGEN FOLIC) capsule 1 Tab (Patient Supplied)  1 Tab Oral DAILY    latanoprost (XALATAN) 0.005 % ophthalmic solution 1 Drop  1 Drop Both Eyes QHS    levothyroxine (SYNTHROID) tablet 25 mcg  25 mcg Oral ACB    0.9% sodium chloride infusion  75 mL/hr IntraVENous CONTINUOUS    insulin regular (NOVOLIN R, HUMULIN R) injection   SubCUTAneous AC&HS    metroNIDAZOLE (FLAGYL) tablet 500 mg  500 mg Oral Q12H       Other Studies (last 24 hours):  Xr Ba Swallow Esophogram    Result Date: 5/4/2018  BARIUM SWALLOW. HISTORY: Dysphagia with solids and liquids, globus sensation. COMPARISON: None. TECHNIQUE: Double-contrast examination was performed utilizing 100 cc of thin barium solution, 4 grams EZ gas granules followed by 200 cc of thick barium. FINDINGS: Patient swallowed without difficulty although defects and prone. No esophageal strictures, ulcerations or webs. There is a large amount of nonpropulsive tertiary contractions prolonged esophageal emptying with stasis of the barium column within the esophagus or minutes at a time. The esophagus obtained a corkscrew type appearance on some occasions consistent with esophageal spasm. Limited views of the stomach grossly unremarkable. Fluoroscopy time: 2. minutes. Static images: 56. IMPRESSION: Large amount of presbyesophagus with nonpropulsive tertiary contractions and delayed esophageal emptying and esophageal spasm.         Assessment and Plan:     Hospital Problems as of 5/4/2018  Date Reviewed: 4/28/2018          Codes Class Noted - Resolved POA    * (Principal)Esophageal dysphagia ICD-10-CM: R13.10  ICD-9-CM: 787.20  5/4/2018 - Present Yes        CHANTE (acute kidney injury) ([facility] ICD-10-CM: N17.9  ICD-9-CM: 584.9  5/3/2018 - Present Yes        CAD (coronary artery disease) ICD-10-CM: I25.10  ICD-9-CM: 414.00  4/30/2018 - Present Yes        Diabetic ulcer of toe ([facility] ICD-10-CM: E11.621, L97.509  ICD-9-CM: 250.80, 707.15  4/26/2018 - Present Yes        Type 2 diabetes mellitus with nephropathy ([facility] ICD-10-CM: E11.21  ICD-9-CM: 250.40, 583.81  2/8/2018 - Present Yes        Cardiomyopathy, ischemic ICD-10-CM: I25.5  ICD-9-CM: 414.8  7/19/2016 - Present Yes        Hyperlipidemia ICD-10-CM: E78.5  ICD-9-CM: 272.4  7/19/2016 - Present Yes        Chronic systolic heart failure (HCC) ICD-10-CM: I50.22  ICD-9-CM: 428.22  9/16/2015 - Present Yes        Essential hypertension, benign ICD-10-CM: I10  ICD-9-CM: 401.1  9/16/2015 - Present Yes    dysphagia          PLAN:    Dysphagia- speech evaluation,barium swallow,gi consult  Diabetic ulcer- cont antibiotics- surgery 5/7/18  Cardiomyopathy/systolic chf  Acute on ckd- ivf  Low normal bp- cont ivf  cad  Dm type 2- sliding scale      DC planning/Dispo:    DVT ppx:  heparin    Signed:  Charisse Alberto MD

## 2018-05-05 PROBLEM — A41.9 SEPSIS (HCC): Status: ACTIVE | Noted: 2018-01-01

## 2018-05-05 NOTE — PROGRESS NOTES
Pt unable to swallow night time meds even after they were crushed and mixed in med cup of applesauce. Pt gagged and reported that he would be unable to take them. Meds held on [facility]

## 2018-05-05 NOTE — DISCHARGE SUMMARY
Hospitalist Death Summary     Patient ID:  Inessa Easley  [id number]  29 y.o.  1935  Admit date: 5/3/2018  1:06 PM  Discharge date and time: 5/8/2018  Attending: No att. providers found  PCP:  Luis Carlos Harvey DO  Treatment Team: Utilization Review: Rodrigo Jacob RN; Care Manager: Heydi Lopez LMSW    Principal Diagnosis Septic shock [facility])   Principal Problem:    Septic shock ([address] (5/8/2018)    Active Problems:    Chronic systolic heart failure ([address] (9/16/2015)      Essential hypertension, benign (9/16/2015)      Cardiomyopathy, ischemic (7/19/2016)      Hyperlipidemia (7/19/2016)      Type 2 diabetes mellitus with nephropathy ([address] (2/8/2018)      Diabetic ulcer of toe ([address] (4/26/2018)      CAD (coronary artery disease) (4/30/2018)      CHANTE (acute kidney injury) ([address] (5/3/2018)      Esophageal dysphagia (5/4/2018)      Sepsis ([address] (5/5/2018)           HPI:  '80year old male with PMH CAD, DM, CHF EF 25%, ICD, PVD, Diabetic ulcer, who was discharged on 5/1 with IV antibiotics and surgery planned for osteomyelitis of right second toe on 5/7, with concerns of hypotension and dehydration expressed to family  by his [facility] health nurse. Patient denies fevers, chills, n/v/d.  Family states he hasn't been eating or drinking very much and the patient says he just feels weak. Family states patient is not nauseated, but after he swallows the food gets stuck in his esophagus and it comes back up. The only thing that does go down is when he sucks on ice. Creatinine 3.56 today. It was  2.71 on discharge 5/1.      Additionally, family stated the patient fell last night and may have hit his head. Angie Delatorre has an abrasion on the bridge of his nose anything to potentially he may have hit a piece of furniture. Bassem Hernandez do not think any loss of consciousness but they are not certain. CT negative.'    Hospital Course:  He was admitted for hypotension, CHANTE, and R foot osteomyelitis.   He had intermittent hypotension that did not resolve with IVF challenge. He was continued on broad spectrum abx coverage for R foot osteomyelitis. He was ultimately transferred to CCU and started on levophed for persistent hypotension. At ~5:21 AM, he was witnessed going apneic by his RN. Code Blue was called. He had 4 rounds of epinephrine and had 2 shocks for underlying ventricular tachycardia. Also loaded with amiodarone. Unfortunately, pulse was not regained. He was pronounced dead on 5/5/18 at 5:44 AM.    Significant Diagnostic Studies:       Labs: Results:       Chemistry No results for input(s): GLU, NA, K, CL, CO2, BUN, CREA, CA, AGAP, BUCR, TBIL, GPT, AP, TP, ALB, GLOB, AGRAT in the last 72 hours. CBC w/Diff No results for input(s): WBC, RBC, HGB, HCT, PLT, GRANS, LYMPH, EOS, HGBEXT, HCTEXT, PLTEXT, HGBEXT, HCTEXT, PLTEXT in the last 72 hours. Cardiac Enzymes No results for input(s): CPK, CKND1, JAMES in the last 72 hours. No lab exists for component: CKRMB, TROIP   Coagulation No results for input(s): PTP, INR, APTT in the last 72 hours. No lab exists for component: INREXT, INREXT    Lipid Panel No results found for: CHOL, CHOLPOCT, CHOLX, CHLST, CHOLV, 219128, HDL, LDL, LDLC, DLDLP, 526068, VLDLC, VLDL, TGLX, TRIGL, TRIGP, TGLPOCT, CHHD, CHHDX   BNP No results for input(s): BNPP in the last 72 hours. Liver Enzymes No results for input(s): TP, ALB, TBIL, AP, SGOT, GPT in the last 72 hours. No lab exists for component: DBIL   Thyroid Studies No results found for: T4, T3U, TSH, TSHEXT, TSHEXT         Discharge Exam:  Visit Vitals    /60    Pulse (!) 0    Temp 97.8 °F (36.6 °C)    Resp (!) 0    Ht 5' 8\" (1.727 m)    Wt 82.3 kg (181 lb 7 oz)    SpO2 (!) 0%    BMI 27.59 kg/m2     General appearance: unresponsive  Lungs: no lung sounds  Heart: no heart sounds        Signed:  Jordi Lara.  Maliha Vides MD  5/8/2018  5:56 AM

## 2018-05-05 NOTE — PROGRESS NOTES
Pt w/ BP 85/49. Dr Ruth Left notified. 250 mL ordered and administered. Pt now w/BP 85/44. Dr Ruth Left notified and another 250 bolus ordered. Pt reports feeling tired \"as if I worked all day\". Pt in bed. Resting quietly. No visible s/sx of distress. Will continue to monitor.

## 2018-05-05 NOTE — PROGRESS NOTES
Room air O2 saturation 84-90%. Placed on 4 LNC with O2 saturation 92-95%. Respiratory therapist notified.

## 2018-05-05 NOTE — PROGRESS NOTES
On-Call  was contacted for Code Blue. When  arrived, pt had . According to the team, no family was coming due to living one hour away.  said a prayer for pt and family.  provided spiritual care through presence, pastoral conversation, prayer and assurance of prayer to the staff.

## 2018-05-05 NOTE — PROGRESS NOTES
Code Blue Note    Code Blue called due to patient with witnessed apnea and going pulseless. Code lasted from 5:21-5:44 AM.  Given 4 epinephrine, had 2 defibrillations, and loaded with amiodarone without success. He was pronounced dead at 5:44 AM    His daughter, Liliana Naranjo, was updated over the phone.       Sandor Leal MD

## 2018-05-05 NOTE — PROGRESS NOTES
Called for continued hypotension despite 500 CC normal saline bolus and having normal saline running at 75 ml/hr. Has CHANTE and history of LV diastolic dysfunction. Repeat manual check consistent with BP 72/40. Given CHANTE, persistent hypotension, and LV systolic dysfunction, will transfer to ICU for levophed and closer monitoring.     Leah Terry MD

## 2018-05-05 NOTE — PROGRESS NOTES
Notified Kalyani Norton Mr. Butch Roldan O2 sat on room air is 86-90% and has been placed on 5 LNC and is now 92-95%,  hypotensive when lying on his sides but not when he is supine. Mr. Cecy Posey states,\"My left upper leg is hurting and I have to sit on the side of the bed. \" Sat on side of bed without any dizziness and maintained BP. Verbal order to administer ordered Hydrocodone po and Frank Mora states he will place an order for chest xray and abg.

## 2018-05-05 NOTE — PROGRESS NOTES
Problem: Falls - Risk of  Goal: *Absence of Falls  Document Leila Fall Risk and appropriate interventions in the flowsheet.    Outcome: Progressing Towards Goal  Fall Risk Interventions:  Mobility Interventions: Patient to call before getting OOB, Communicate number of staff needed for ambulation/transfer    Mentation Interventions: Adequate sleep, hydration, pain control    Medication Interventions: Patient to call before getting OOB, Teach patient to arise slowly    Elimination Interventions: Call light in reach, Patient to call for help with toileting needs, Urinal in reach    History of Falls Interventions: Consult care management for discharge planning, Door open when patient unattended, Evaluate medications/consider consulting pharmacy, Investigate reason for fall

## 2018-05-05 NOTE — PROGRESS NOTES
Tomas Adair TRANSFER - OUT REPORT:    Verbal report given to LOIDA Peace  being transferred to CCU for routine progression of care       Report consisted of patients Situation, Background, Assessment and   Recommendations(SBAR). Information from the following report(s) SBAR, Kardex and Recent Results was reviewed with the receiving nurse. Lines:   PICC Double Lumen 80/91/27 Right;Basilic (Active)   Central Line Being Utilized Yes 5/4/2018 10:43 AM   Criteria for Appropriate Use Long term IV/antibiotic administration 5/4/2018 10:43 AM   Site Assessment Clean, dry, & intact 5/4/2018 10:43 AM   Phlebitis Assessment 0 5/4/2018 10:43 AM   Infiltration Assessment 0 5/4/2018 10:43 AM   Date of Last Dressing Change 05/01/18 5/4/2018  7:47 AM   Dressing Status Clean, dry, & intact 5/4/2018  7:47 AM   Dressing Type Disk with Chlorhexadine gluconate (CHG); Stabilization/securement device;Transparent 5/4/2018  7:47 AM   Hub Color/Line Status Red;Flushed;Patent; Infusing 5/4/2018  5:02 PM   Positive Blood Return (Site #1) Yes 5/4/2018  5:02 PM   Hub Color/Line Status Purple;Patent; Flushed 5/4/2018  5:02 PM   Positive Blood Return (Site #2) Yes 5/4/2018  5:02 PM   Alcohol Cap Used No 5/4/2018  7:47 AM        Opportunity for questions and clarification was provided.       Patient transported with:   Registered Nurse  Tech

## 2018-05-05 NOTE — PROGRESS NOTES
Problem: Pressure Injury - Risk of  Goal: *Prevention of pressure injury  Document Ignacio Scale and appropriate interventions in the flowsheet.    Outcome: Progressing Towards Goal  Pressure Injury Interventions:  Sensory Interventions: Assess changes in LOC    Moisture Interventions: Absorbent underpads    Activity Interventions: Increase time out of bed, Pressure redistribution bed/mattress(bed type)    Mobility Interventions: Pressure redistribution bed/mattress (bed type)    Nutrition Interventions: Offer support with meals,snacks and hydration, Document food/fluid/supplement intake    Friction and Shear Interventions: HOB 30 degrees or less

## 2018-05-05 NOTE — PROGRESS NOTES
Pt had a barium swallow test completed today. Team will discuss results with him tomorrow? Still having difficulties swallowing. Amputation of 2nd toe on right foot will occur Monday at 1100. See other progress note for BP information. No c/o pain during the shift.

## 2018-05-05 NOTE — PROGRESS NOTES
Pt w/ BP 72/44 monitor and 68/28 manual post 250 mL bolus x2. Dr Johanna Gar notified. Dr Johanna Gar at bedside. Pt to be transferred to ICU d/t hypotension. Pt's wife notified.

## 2018-05-05 NOTE — PROGRESS NOTES
Post-Mortem care completed by Ines Christian RN and Ge Heard RN with dentures and other personal belongings with patient.

## 2018-05-07 NOTE — CDMP QUERY
Please clarify if this patient is being treated/managed for:    Septic Shock  in septic patient with hypotension despite ivf that required Levo and move to ICU.     =>Other Explanation of clinical findings  =>Unable to Determine (no explanation of clinical findings)    The medical record reflects the following:    Risk Factors: CHANTE, Sepsis, Osteo    Clinical Indicators: hypotension despite IVF bolus    Treatment: Levophed gtt, move to ICU    Please clarify and document your clinical opinion in the progress notes and discharge summary including the definitive and/or presumptive diagnosis, (suspected or probable), related to the above clinical findings. Please include clinical findings supporting your diagnosis.     Thanks,  Meenu Sahu RN, CDS  Compliant Documentation Management Program  [phone]

## 2018-05-08 PROBLEM — A41.9 SEPTIC SHOCK (HCC): Status: ACTIVE | Noted: 2018-05-08

## 2018-05-08 PROBLEM — R65.21 SEPTIC SHOCK (HCC): Status: ACTIVE | Noted: 2018-05-08
